# Patient Record
Sex: FEMALE | Race: WHITE | Employment: FULL TIME | URBAN - METROPOLITAN AREA
[De-identification: names, ages, dates, MRNs, and addresses within clinical notes are randomized per-mention and may not be internally consistent; named-entity substitution may affect disease eponyms.]

---

## 2017-02-23 ENCOUNTER — OFFICE VISIT (OUTPATIENT)
Dept: ORTHOPEDIC SURGERY | Age: 47
End: 2017-02-23

## 2017-02-23 VITALS
SYSTOLIC BLOOD PRESSURE: 121 MMHG | DIASTOLIC BLOOD PRESSURE: 83 MMHG | HEIGHT: 61 IN | HEART RATE: 83 BPM | WEIGHT: 169.97 LBS | BODY MASS INDEX: 32.09 KG/M2

## 2017-02-23 DIAGNOSIS — M79.671 RIGHT FOOT PAIN: Primary | ICD-10-CM

## 2017-02-23 DIAGNOSIS — M21.611 BUNION OF RIGHT FOOT: ICD-10-CM

## 2017-02-23 DIAGNOSIS — M67.471 GANGLION CYST OF RIGHT FOOT: ICD-10-CM

## 2017-02-23 PROCEDURE — 73630 X-RAY EXAM OF FOOT: CPT | Performed by: ORTHOPAEDIC SURGERY

## 2017-02-23 PROCEDURE — 99214 OFFICE O/P EST MOD 30 MIN: CPT | Performed by: ORTHOPAEDIC SURGERY

## 2018-07-20 ENCOUNTER — OFFICE VISIT (OUTPATIENT)
Dept: INTERNAL MEDICINE CLINIC | Age: 48
End: 2018-07-20

## 2018-07-20 VITALS
DIASTOLIC BLOOD PRESSURE: 80 MMHG | HEIGHT: 61 IN | RESPIRATION RATE: 18 BRPM | BODY MASS INDEX: 32.1 KG/M2 | SYSTOLIC BLOOD PRESSURE: 140 MMHG | WEIGHT: 170 LBS | HEART RATE: 70 BPM

## 2018-07-20 DIAGNOSIS — C43.72 MALIGNANT MELANOMA OF LEFT THIGH (HCC): ICD-10-CM

## 2018-07-20 DIAGNOSIS — K21.9 GASTROESOPHAGEAL REFLUX DISEASE WITHOUT ESOPHAGITIS: ICD-10-CM

## 2018-07-20 DIAGNOSIS — I10 BENIGN ESSENTIAL HTN: ICD-10-CM

## 2018-07-20 DIAGNOSIS — F41.9 ANXIETY: ICD-10-CM

## 2018-07-20 DIAGNOSIS — Z76.89 ENCOUNTER TO ESTABLISH CARE: Primary | ICD-10-CM

## 2018-07-20 DIAGNOSIS — E78.2 MIXED HYPERLIPIDEMIA: ICD-10-CM

## 2018-07-20 PROCEDURE — 99202 OFFICE O/P NEW SF 15 MIN: CPT | Performed by: INTERNAL MEDICINE

## 2018-07-20 RX ORDER — BUSPIRONE HYDROCHLORIDE 5 MG/1
5 TABLET ORAL DAILY
COMMUNITY
End: 2019-06-20 | Stop reason: SDUPTHER

## 2018-07-20 RX ORDER — ESOMEPRAZOLE MAGNESIUM 20 MG/1
20 FOR SUSPENSION ORAL DAILY
COMMUNITY
End: 2018-09-25 | Stop reason: SDUPTHER

## 2018-07-20 RX ORDER — HYDROCHLOROTHIAZIDE 25 MG/1
25 TABLET ORAL DAILY
COMMUNITY
End: 2018-10-31 | Stop reason: SDUPTHER

## 2018-07-20 ASSESSMENT — PATIENT HEALTH QUESTIONNAIRE - PHQ9
1. LITTLE INTEREST OR PLEASURE IN DOING THINGS: 0
SUM OF ALL RESPONSES TO PHQ QUESTIONS 1-9: 0
SUM OF ALL RESPONSES TO PHQ9 QUESTIONS 1 & 2: 0
2. FEELING DOWN, DEPRESSED OR HOPELESS: 0

## 2018-07-20 ASSESSMENT — ENCOUNTER SYMPTOMS
CONSTIPATION: 0
ABDOMINAL PAIN: 0
NAUSEA: 0
CHEST TIGHTNESS: 0
TROUBLE SWALLOWING: 0
COLOR CHANGE: 0
SHORTNESS OF BREATH: 0
PHOTOPHOBIA: 0

## 2018-07-20 NOTE — PROGRESS NOTES
current facility-administered medications for this visit. Review of Systems   Constitutional: Negative for activity change, diaphoresis and unexpected weight change. HENT: Negative for congestion, ear discharge, hearing loss and trouble swallowing. Eyes: Negative for photophobia and visual disturbance. Respiratory: Negative for chest tightness and shortness of breath. Cardiovascular: Negative for chest pain and palpitations. Gastrointestinal: Negative for abdominal pain, constipation and nausea. Endocrine: Negative for polyuria. Genitourinary: Negative for dysuria, flank pain and hematuria. Musculoskeletal: Negative for arthralgias and gait problem. Skin: Negative for color change. Allergic/Immunologic: Negative for immunocompromised state. Neurological: Negative for tremors, seizures, weakness and numbness. Psychiatric/Behavioral: Negative for decreased concentration and sleep disturbance. The patient is nervous/anxious. Objective:   Physical Exam    Vitals:    07/20/18 1530   BP: (!) 140/80   Pulse: 70   Resp: 18         General:  Middle aged female, Awake, alert and oriented. Appears to be not in any distress  Mucous Membranes:  Pink , anicteric  Neck: No JVD, no carotid bruit, no thyromegaly  Chest:  Clear to auscultation bilaterally, no added sounds  Cardiovascular:  RRR S1S2 heard, no murmurs or gallops  Abdomen:  Soft, undistended, non tender, no organomegaly, BS present  Extremities: faint lacy rash on both legs noted, No edema or cyanosis. Distal pulses well felt  Neurological : grossly normal  Non focal     Assessment:       Diagnosis Orders   1. Encounter to establish care     2. Malignant melanoma of left thigh ([address]     3. Benign essential HTN     4. Mixed hyperlipidemia     5. Gastroesophageal reflux disease without esophagitis     6.  Anxiety             Plan:        HTN - stable on norvasc, hctz    Chronic anxiety/depression - on buspar prn    GERD - on PPI Hyperlipidemia - on statins - last     Elevated A1c - diet controlled, last A1c at 6     Intermittent rash on lower ext - etiology unclear.  Pt reports rash only when standing or walking for long distances and resolves in a week with no intervention     Melanoma - s.p excision     Family hx of CAD - recommend Ct coronary calcium scoring given early onset CAD   F/w 6 months

## 2018-08-20 ENCOUNTER — OFFICE VISIT (OUTPATIENT)
Dept: INTERNAL MEDICINE CLINIC | Age: 48
End: 2018-08-20

## 2018-08-20 VITALS
BODY MASS INDEX: 31.91 KG/M2 | RESPIRATION RATE: 18 BRPM | HEIGHT: 61 IN | DIASTOLIC BLOOD PRESSURE: 83 MMHG | WEIGHT: 169 LBS | HEART RATE: 85 BPM | SYSTOLIC BLOOD PRESSURE: 143 MMHG

## 2018-08-20 DIAGNOSIS — I10 BENIGN ESSENTIAL HTN: Primary | ICD-10-CM

## 2018-08-20 PROCEDURE — 99212 OFFICE O/P EST SF 10 MIN: CPT | Performed by: INTERNAL MEDICINE

## 2018-08-20 ASSESSMENT — ENCOUNTER SYMPTOMS
TROUBLE SWALLOWING: 0
SHORTNESS OF BREATH: 0
CHEST TIGHTNESS: 0
CONSTIPATION: 0
ABDOMINAL PAIN: 0
NAUSEA: 0
COLOR CHANGE: 0
PHOTOPHOBIA: 0

## 2018-08-20 NOTE — PROGRESS NOTES
Subjective:      Patient ID: Dinorah Anderson is a 51 y.o. female. Hypertension   Pertinent negatives include no chest pain, palpitations or shortness of breath. 51 y.o. female here for high BP readings noted at school today  Has been having palpitations, stress with school work and notes high readings  And got more worried     HTN - chronic and stable on hctz and norvasc, no dizziness or pedal edema     Has h.o chronic anxiety on buspar prn only. Does not use frequently  Reports anxiety from health and family stress  Works as a  in [place]  No depression issues      Current Outpatient Prescriptions   Medication Sig Dispense Refill    busPIRone (BUSPAR) 5 MG tablet Take 5 mg by mouth daily      hydrochlorothiazide (HYDRODIURIL) 25 MG tablet Take 25 mg by mouth daily      esomeprazole Magnesium (NEXIUM) 20 MG PACK Take 20 mg by mouth daily      amLODIPine (NORVASC) 5 MG tablet       atorvastatin (LIPITOR) 20 MG tablet Take 10 mg by mouth daily       aspirin 81 MG tablet Take 81 mg by mouth daily. No current facility-administered medications for this visit. Review of Systems   Constitutional: Negative for activity change, diaphoresis and unexpected weight change. HENT: Negative for congestion, ear discharge, hearing loss and trouble swallowing. Eyes: Negative for photophobia and visual disturbance. Respiratory: Negative for chest tightness and shortness of breath. Cardiovascular: Negative for chest pain and palpitations. Gastrointestinal: Negative for abdominal pain, constipation and nausea. Endocrine: Negative for polyuria. Genitourinary: Negative for dysuria, flank pain and hematuria. Musculoskeletal: Negative for arthralgias and gait problem. Skin: Negative for color change. Allergic/Immunologic: Negative for immunocompromised state. Neurological: Negative for tremors, seizures, weakness and numbness.    Psychiatric/Behavioral: Negative for decreased concentration and sleep disturbance. The patient is nervous/anxious. Objective:   Physical Exam    Vitals:    08/20/18 1601   BP: (!) 143/83   Pulse: 85   Resp: 18         General:  Middle aged female, Awake, alert and oriented. Appears to be not in any distress  Mucous Membranes:  Pink , anicteric  Neck: No JVD, no carotid bruit, no thyromegaly  Chest:  Clear to auscultation bilaterally, no added sounds  Cardiovascular:  RRR S1S2 heard, no murmurs or gallops  Abdomen:  Soft, undistended, non tender, no organomegaly, BS present  Extremities: faint lacy rash on both legs noted, No edema or cyanosis. Distal pulses well felt  Neurological : grossly normal  Non focal     Assessment:       Diagnosis Orders   1. Benign essential HTN             Plan:        HTN - stable on norvasc, hctz today.  Not significant high as pt thought  Pt reassured  Pt symptoms could be related to anxiety      Chronic anxiety/depression - on buspar prn      Family hx of CAD - recommend Ct coronary calcium scoring given early onset CAD   F/w 6 months

## 2018-09-25 RX ORDER — ESOMEPRAZOLE MAGNESIUM 20 MG/1
20 FOR SUSPENSION ORAL DAILY
Qty: 30 PACKET | Refills: 2 | Status: SHIPPED | OUTPATIENT
Start: 2018-09-25 | End: 2018-11-26 | Stop reason: SDUPTHER

## 2018-10-08 RX ORDER — ATORVASTATIN CALCIUM 20 MG/1
10 TABLET, FILM COATED ORAL DAILY
Qty: 15 TABLET | Refills: 0 | Status: SHIPPED | OUTPATIENT
Start: 2018-10-08 | End: 2018-11-07 | Stop reason: SDUPTHER

## 2018-11-01 RX ORDER — AMLODIPINE BESYLATE 5 MG/1
5 TABLET ORAL DAILY
Qty: 30 TABLET | Refills: 0 | Status: SHIPPED | OUTPATIENT
Start: 2018-11-01 | End: 2018-11-26 | Stop reason: SDUPTHER

## 2018-11-01 RX ORDER — HYDROCHLOROTHIAZIDE 25 MG/1
25 TABLET ORAL DAILY
Qty: 30 TABLET | Refills: 0 | Status: SHIPPED | OUTPATIENT
Start: 2018-11-01 | End: 2018-11-26 | Stop reason: SDUPTHER

## 2018-11-07 RX ORDER — ATORVASTATIN CALCIUM 20 MG/1
TABLET, FILM COATED ORAL
Qty: 15 TABLET | Refills: 0 | Status: SHIPPED | OUTPATIENT
Start: 2018-11-07 | End: 2018-12-12 | Stop reason: SDUPTHER

## 2018-11-26 RX ORDER — HYDROCHLOROTHIAZIDE 25 MG/1
25 TABLET ORAL DAILY
Qty: 30 TABLET | Refills: 2 | Status: SHIPPED | OUTPATIENT
Start: 2018-11-26 | End: 2019-01-15 | Stop reason: SDUPTHER

## 2018-11-26 RX ORDER — AMLODIPINE BESYLATE 5 MG/1
5 TABLET ORAL DAILY
Qty: 30 TABLET | Refills: 2 | Status: SHIPPED | OUTPATIENT
Start: 2018-11-26 | End: 2019-01-15 | Stop reason: SDUPTHER

## 2018-11-26 RX ORDER — ESOMEPRAZOLE MAGNESIUM 20 MG/1
20 FOR SUSPENSION ORAL DAILY
Qty: 30 PACKET | Refills: 2 | Status: SHIPPED | OUTPATIENT
Start: 2018-11-26 | End: 2018-12-19 | Stop reason: SDUPTHER

## 2018-12-12 RX ORDER — ATORVASTATIN CALCIUM 20 MG/1
TABLET, FILM COATED ORAL
Qty: 15 TABLET | Refills: 0 | Status: SHIPPED | OUTPATIENT
Start: 2018-12-12 | End: 2019-01-15 | Stop reason: SDUPTHER

## 2018-12-17 ENCOUNTER — TELEPHONE (OUTPATIENT)
Dept: INTERNAL MEDICINE CLINIC | Age: 48
End: 2018-12-17

## 2018-12-17 RX ORDER — PANTOPRAZOLE SODIUM 40 MG/1
40 TABLET, DELAYED RELEASE ORAL DAILY
Qty: 90 TABLET | Refills: 0 | Status: SHIPPED | OUTPATIENT
Start: 2018-12-17 | End: 2019-01-15 | Stop reason: ALTCHOICE

## 2018-12-17 NOTE — TELEPHONE ENCOUNTER
----- Message from Faviola Partida MD sent at 12/17/2018  1:46 PM EST -----  Change to Protonix 40 mg po daily  ----- Message -----  From: Getachew Hernandez  Sent: 12/17/2018   9:29 AM  To: Faviola Partida MD    PA for Nexium was denied. Pt must try lansoprazole, omeprazole, pantoprazole or dexilant. Please advise.

## 2018-12-19 RX ORDER — ESOMEPRAZOLE MAGNESIUM 20 MG/1
20 FOR SUSPENSION ORAL DAILY
Qty: 30 PACKET | Refills: 0 | Status: SHIPPED | OUTPATIENT
Start: 2018-12-19 | End: 2019-01-15 | Stop reason: ALTCHOICE

## 2018-12-20 ENCOUNTER — TELEPHONE (OUTPATIENT)
Dept: INTERNAL MEDICINE CLINIC | Age: 48
End: 2018-12-20

## 2019-01-15 ENCOUNTER — OFFICE VISIT (OUTPATIENT)
Dept: INTERNAL MEDICINE CLINIC | Age: 49
End: 2019-01-15

## 2019-01-15 VITALS
HEIGHT: 61 IN | HEART RATE: 70 BPM | RESPIRATION RATE: 18 BRPM | WEIGHT: 171 LBS | SYSTOLIC BLOOD PRESSURE: 110 MMHG | BODY MASS INDEX: 32.28 KG/M2 | DIASTOLIC BLOOD PRESSURE: 78 MMHG

## 2019-01-15 DIAGNOSIS — Z00.00 ANNUAL PHYSICAL EXAM: Primary | ICD-10-CM

## 2019-01-15 DIAGNOSIS — I10 BENIGN ESSENTIAL HTN: ICD-10-CM

## 2019-01-15 DIAGNOSIS — E78.2 MIXED HYPERLIPIDEMIA: ICD-10-CM

## 2019-01-15 DIAGNOSIS — K21.9 GASTROESOPHAGEAL REFLUX DISEASE WITHOUT ESOPHAGITIS: ICD-10-CM

## 2019-01-15 DIAGNOSIS — Z12.31 SCREENING MAMMOGRAM, ENCOUNTER FOR: ICD-10-CM

## 2019-01-15 PROCEDURE — 99396 PREV VISIT EST AGE 40-64: CPT | Performed by: INTERNAL MEDICINE

## 2019-01-15 PROCEDURE — 81002 URINALYSIS NONAUTO W/O SCOPE: CPT | Performed by: INTERNAL MEDICINE

## 2019-01-15 RX ORDER — HYDROCHLOROTHIAZIDE 25 MG/1
25 TABLET ORAL DAILY
Qty: 90 TABLET | Refills: 1 | Status: SHIPPED | OUTPATIENT
Start: 2019-01-15 | End: 2019-06-20 | Stop reason: SDUPTHER

## 2019-01-15 RX ORDER — AMLODIPINE BESYLATE 5 MG/1
5 TABLET ORAL DAILY
Qty: 90 TABLET | Refills: 1 | Status: SHIPPED | OUTPATIENT
Start: 2019-01-15 | End: 2019-06-20 | Stop reason: SDUPTHER

## 2019-01-15 RX ORDER — ATORVASTATIN CALCIUM 10 MG/1
10 TABLET, FILM COATED ORAL DAILY
Qty: 90 TABLET | Refills: 1 | Status: SHIPPED | OUTPATIENT
Start: 2019-01-15 | End: 2019-06-20 | Stop reason: SDUPTHER

## 2019-01-15 RX ORDER — ATORVASTATIN CALCIUM 20 MG/1
TABLET, FILM COATED ORAL
Qty: 45 TABLET | Refills: 2 | Status: SHIPPED | OUTPATIENT
Start: 2019-01-15 | End: 2019-01-15 | Stop reason: ALTCHOICE

## 2019-01-15 ASSESSMENT — ENCOUNTER SYMPTOMS
TROUBLE SWALLOWING: 0
CHEST TIGHTNESS: 0
COLOR CHANGE: 0
NAUSEA: 0
PHOTOPHOBIA: 0
CONSTIPATION: 0
ABDOMINAL PAIN: 0

## 2019-01-15 ASSESSMENT — PATIENT HEALTH QUESTIONNAIRE - PHQ9
SUM OF ALL RESPONSES TO PHQ QUESTIONS 1-9: 0
2. FEELING DOWN, DEPRESSED OR HOPELESS: 0
SUM OF ALL RESPONSES TO PHQ QUESTIONS 1-9: 0
1. LITTLE INTEREST OR PLEASURE IN DOING THINGS: 0
SUM OF ALL RESPONSES TO PHQ9 QUESTIONS 1 & 2: 0

## 2019-05-21 ENCOUNTER — TELEPHONE (OUTPATIENT)
Dept: INTERNAL MEDICINE CLINIC | Age: 49
End: 2019-05-21

## 2019-05-21 NOTE — TELEPHONE ENCOUNTER
----- Message from Jose Juan Baldwin sent at 5/20/2019  2:03 PM EDT -----  Contact: Dimas Doshi  Left mess. For pt to call and sched appt with tushar -satya   ----- Message -----  From: Tim Newell  Sent: 5/20/2019   1:48 PM  To: Jose Juan Baldwin        ----- Message -----  From: Lola Leggett MD  Sent: 5/20/2019   1:15 PM  To: Romero Barnes    Make appt with TUSHAR    ----- Message -----  From: Romero Arroyo  Sent: 5/20/2019   8:15 AM  To: Lola Leggett MD    Wanted to see you this week. Had chest pains a couple of days last week and over the weekend Her BP was up one day   No appt available.  Please advise Cell [phone] Thanks [facility]

## 2019-05-22 ENCOUNTER — TELEPHONE (OUTPATIENT)
Dept: INTERNAL MEDICINE CLINIC | Age: 49
End: 2019-05-22

## 2019-06-20 ENCOUNTER — OFFICE VISIT (OUTPATIENT)
Dept: FAMILY MEDICINE CLINIC | Age: 49
End: 2019-06-20
Payer: COMMERCIAL

## 2019-06-20 VITALS
DIASTOLIC BLOOD PRESSURE: 60 MMHG | TEMPERATURE: 98.5 F | HEIGHT: 61 IN | HEART RATE: 78 BPM | BODY MASS INDEX: 32.28 KG/M2 | OXYGEN SATURATION: 98 % | SYSTOLIC BLOOD PRESSURE: 114 MMHG | WEIGHT: 171 LBS

## 2019-06-20 DIAGNOSIS — F41.9 ANXIETY: ICD-10-CM

## 2019-06-20 DIAGNOSIS — R79.89 LOW VITAMIN D LEVEL: ICD-10-CM

## 2019-06-20 DIAGNOSIS — Z00.00 PHYSICAL EXAM: Primary | ICD-10-CM

## 2019-06-20 LAB
A/G RATIO: 1.5 (ref 1.1–2.2)
ALBUMIN SERPL-MCNC: 4.3 G/DL (ref 3.4–5)
ALP BLD-CCNC: 73 U/L (ref 40–129)
ALT SERPL-CCNC: 25 U/L (ref 10–40)
ANION GAP SERPL CALCULATED.3IONS-SCNC: 17 MMOL/L (ref 3–16)
AST SERPL-CCNC: 18 U/L (ref 15–37)
BASOPHILS ABSOLUTE: 0.1 K/UL (ref 0–0.2)
BASOPHILS RELATIVE PERCENT: 1 %
BILIRUB SERPL-MCNC: 0.5 MG/DL (ref 0–1)
BUN BLDV-MCNC: 11 MG/DL (ref 7–20)
CALCIUM SERPL-MCNC: 9.4 MG/DL (ref 8.3–10.6)
CHLORIDE BLD-SCNC: 102 MMOL/L (ref 99–110)
CHOLESTEROL, TOTAL: 208 MG/DL (ref 0–199)
CO2: 23 MMOL/L (ref 21–32)
CREAT SERPL-MCNC: 0.6 MG/DL (ref 0.6–1.1)
EOSINOPHILS ABSOLUTE: 0.2 K/UL (ref 0–0.6)
EOSINOPHILS RELATIVE PERCENT: 2.2 %
GFR AFRICAN AMERICAN: >60
GFR NON-AFRICAN AMERICAN: >60
GLOBULIN: 2.8 G/DL
GLUCOSE BLD-MCNC: 103 MG/DL (ref 70–99)
HCT VFR BLD CALC: 46.4 % (ref 36–48)
HDLC SERPL-MCNC: 49 MG/DL (ref 40–60)
HEMOGLOBIN: 15.4 G/DL (ref 12–16)
LDL CHOLESTEROL CALCULATED: 115 MG/DL
LYMPHOCYTES ABSOLUTE: 2.2 K/UL (ref 1–5.1)
LYMPHOCYTES RELATIVE PERCENT: 25 %
MCH RBC QN AUTO: 28.5 PG (ref 26–34)
MCHC RBC AUTO-ENTMCNC: 33.2 G/DL (ref 31–36)
MCV RBC AUTO: 85.8 FL (ref 80–100)
MONOCYTES ABSOLUTE: 0.6 K/UL (ref 0–1.3)
MONOCYTES RELATIVE PERCENT: 7.2 %
NEUTROPHILS ABSOLUTE: 5.8 K/UL (ref 1.7–7.7)
NEUTROPHILS RELATIVE PERCENT: 64.6 %
PDW BLD-RTO: 13 % (ref 12.4–15.4)
PLATELET # BLD: 316 K/UL (ref 135–450)
PMV BLD AUTO: 9.1 FL (ref 5–10.5)
POTASSIUM SERPL-SCNC: 3.8 MMOL/L (ref 3.5–5.1)
RBC # BLD: 5.4 M/UL (ref 4–5.2)
SODIUM BLD-SCNC: 142 MMOL/L (ref 136–145)
TOTAL PROTEIN: 7.1 G/DL (ref 6.4–8.2)
TRIGL SERPL-MCNC: 221 MG/DL (ref 0–150)
TSH REFLEX FT4: 1.24 UIU/ML (ref 0.27–4.2)
VLDLC SERPL CALC-MCNC: 44 MG/DL
WBC # BLD: 9 K/UL (ref 4–11)

## 2019-06-20 PROCEDURE — 36415 COLL VENOUS BLD VENIPUNCTURE: CPT | Performed by: NURSE PRACTITIONER

## 2019-06-20 PROCEDURE — 99386 PREV VISIT NEW AGE 40-64: CPT | Performed by: NURSE PRACTITIONER

## 2019-06-20 RX ORDER — HYDROCHLOROTHIAZIDE 25 MG/1
25 TABLET ORAL DAILY
Qty: 90 TABLET | Refills: 1 | Status: SHIPPED | OUTPATIENT
Start: 2019-06-20 | End: 2020-02-03 | Stop reason: SDUPTHER

## 2019-06-20 RX ORDER — ATORVASTATIN CALCIUM 10 MG/1
10 TABLET, FILM COATED ORAL DAILY
Qty: 90 TABLET | Refills: 1 | Status: SHIPPED | OUTPATIENT
Start: 2019-06-20 | End: 2019-12-21 | Stop reason: SDUPTHER

## 2019-06-20 RX ORDER — AMLODIPINE BESYLATE 5 MG/1
5 TABLET ORAL DAILY
Qty: 90 TABLET | Refills: 1 | Status: SHIPPED | OUTPATIENT
Start: 2019-06-20 | End: 2019-12-21 | Stop reason: SDUPTHER

## 2019-06-20 RX ORDER — BUSPIRONE HYDROCHLORIDE 5 MG/1
5 TABLET ORAL 3 TIMES DAILY PRN
Qty: 90 TABLET | Refills: 1 | Status: SHIPPED | OUTPATIENT
Start: 2019-06-20 | End: 2021-03-04 | Stop reason: SINTOL

## 2019-06-20 ASSESSMENT — ENCOUNTER SYMPTOMS
RHINORRHEA: 0
SHORTNESS OF BREATH: 0
EYE PAIN: 0
NAUSEA: 0
BACK PAIN: 0
DIARRHEA: 0
CONSTIPATION: 0
WHEEZING: 0
SORE THROAT: 0
VOMITING: 0
COLOR CHANGE: 0

## 2019-06-20 NOTE — PROGRESS NOTES
Patient ID: Kasandra Weaver is a 51 y.o. female who presents today for a Physical Exam.      ELLIE Jaimes is a 52 yo female who is here for physical and to establish as new patient. Has times where she will get hot, flushed, heart palpitations but not mentally. 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 She states she is going to have more stress at work in the fall. Wants to try to exercise and change diet before trying meds at this time. Past Medical History:   Diagnosis Date    Anxiety     GERD (gastroesophageal reflux disease)     Hyperlipidemia     Hypertension     [facility] ([facility] [address]        Past Surgical History:   Procedure Laterality Date    HYSTERECTOMY, TOTAL ABDOMINAL      Has ovaries    TONSILLECTOMY         Family History   Problem Relation Age of Onset    Heart Disease Father     High Blood Pressure Father     Macular Degen Mother     Diabetes Mother     High Cholesterol Mother     High Cholesterol Brother     Diabetes Maternal Grandmother     Heart Disease Maternal Grandfather     Cancer Paternal Grandmother     Heart Disease Paternal Grandfather           Social History     Socioeconomic History    Marital status:      Spouse name: None    Number of children: None    Years of education: None    Highest education level: None   Occupational History    None   Social Needs    Financial resource strain: None    Food insecurity:     Worry: None     Inability: None    Transportation needs:     Medical: None     Non-medical: None   Tobacco Use    Smoking status: Never Smoker    Smokeless tobacco: Never Used   Substance and Sexual Activity    Alcohol use:  Yes    Drug use: No    Sexual activity: None   Lifestyle    Physical activity:     Days per week: None constipation, diarrhea, nausea and vomiting. Endocrine: Negative for cold intolerance and heat intolerance. Genitourinary: Positive for frequency. Negative for dysuria and urgency. Musculoskeletal: Negative for back pain and myalgias. Skin: Negative for color change, pallor and rash. Allergic/Immunologic: Negative for environmental allergies and food allergies. Neurological: Negative for dizziness, light-headedness and headaches. Hematological: Does not bruise/bleed easily. Psychiatric/Behavioral: Positive for dysphoric mood. Negative for sleep disturbance. The patient is nervous/anxious. Physical Exam   Constitutional: She is oriented to person, place, and time. She appears well-developed and well-nourished. HENT:   Head: Normocephalic and atraumatic. Right Ear: Tympanic membrane and external ear normal.   Left Ear: Tympanic membrane and external ear normal.   Nose: Nose normal.   Mouth/Throat: Oropharynx is clear and moist. No oropharyngeal exudate, posterior oropharyngeal edema or posterior oropharyngeal erythema. Eyes: Pupils are equal, round, and reactive to light. Conjunctivae are normal.   Neck: Normal range of motion. Neck supple. Cardiovascular: Normal rate, regular rhythm and normal heart sounds. No murmur heard. Pulmonary/Chest: Effort normal and breath sounds normal. No respiratory distress. She has no wheezes. She has no rales. Abdominal: Soft. Bowel sounds are normal. She exhibits no distension. There is no tenderness. There is no rebound. Musculoskeletal: Normal range of motion. Lymphadenopathy:     She has no cervical adenopathy. Neurological: She is alert and oriented to person, place, and time. She has normal reflexes. Skin: Skin is warm and dry. Psychiatric: She has a normal mood and affect. Her behavior is normal. Judgment and thought content normal.   Nursing note and vitals reviewed. Assessment:  Encounter Diagnoses   Name Primary?     Low vitamin D level     Physical exam Yes    Anxiety        Plan:  1. Low vitamin D level    - Vitamin D 25 Hydroxy    2. Physical exam    - CBC Auto Differential  - Comprehensive Metabolic Panel  - Lipid Panel  - Hemoglobin A1C  - TSH WITH REFLEX TO FT4    3. Anxiety  Patient will try changing diet and exercise- maybe try some yoga and let me know if she wants to try lexapro. She will take 1/2 of a 10 mg dose daily for a week and then take full tablet ( if she decides to take meds)                  No follow-ups on file.

## 2019-06-21 LAB
ESTIMATED AVERAGE GLUCOSE: 139.9 MG/DL
HBA1C MFR BLD: 6.5 %
VITAMIN D 25-HYDROXY: 49.9 NG/ML

## 2019-07-18 ENCOUNTER — OFFICE VISIT (OUTPATIENT)
Dept: FAMILY MEDICINE CLINIC | Age: 49
End: 2019-07-18
Payer: COMMERCIAL

## 2019-07-18 VITALS
DIASTOLIC BLOOD PRESSURE: 68 MMHG | BODY MASS INDEX: 31.74 KG/M2 | WEIGHT: 168 LBS | OXYGEN SATURATION: 97 % | HEART RATE: 76 BPM | TEMPERATURE: 98.6 F | SYSTOLIC BLOOD PRESSURE: 112 MMHG

## 2019-07-18 DIAGNOSIS — L72.3 SEBACEOUS CYST OF RIGHT AXILLA: Primary | ICD-10-CM

## 2019-07-18 PROCEDURE — 99213 OFFICE O/P EST LOW 20 MIN: CPT | Performed by: NURSE PRACTITIONER

## 2019-07-18 ASSESSMENT — ENCOUNTER SYMPTOMS
SHORTNESS OF BREATH: 0
WHEEZING: 0

## 2019-07-18 NOTE — PROGRESS NOTES
Patient: Daily Castro is a 51 y.o. female who presents today with the following Chief Complaint(s):  Chief Complaint   Patient presents with    Cyst     under right arm         HPI   Presents for cyst under right arm. Diabetes: States she is doing well with changing her diet and exercising after A1c of 6.5- will recheck A1C in Sept. If higher will start meds. Discusses plan for calcium scoring test and vascular screening. She states [facility] has a program where you pay $100 to get vascular screenings done- she is doing this soon. Current Outpatient Medications   Medication Sig Dispense Refill    Cholecalciferol (VITAMIN D3) 5000 units TABS Take by mouth      Cyanocobalamin (VITAMIN B-12 PO) Take 1,200 mg by mouth daily      esomeprazole (NEXIUM) 20 MG delayed release capsule Take 1 capsule by mouth daily 90 capsule 1    atorvastatin (LIPITOR) 10 MG tablet Take 1 tablet by mouth daily 90 tablet 1    amLODIPine (NORVASC) 5 MG tablet Take 1 tablet by mouth daily 90 tablet 1    hydrochlorothiazide (HYDRODIURIL) 25 MG tablet Take 1 tablet by mouth daily 90 tablet 1    busPIRone (BUSPAR) 5 MG tablet Take 1 tablet by mouth 3 times daily as needed (anxiety) 90 tablet 1    aspirin 81 MG tablet Take 81 mg by mouth daily. No current facility-administered medications for this visit. Patient's past medical history, surgical history, family history, medications,  andallergies  were all reviewed and updated as appropriate today. Review of Systems   Respiratory: Negative for shortness of breath and wheezing. Cardiovascular: Negative for chest pain and palpitations. Genitourinary: Negative for dysuria, frequency and urgency. Physical Exam   Constitutional: She is oriented to person, place, and time. She appears well-developed and well-nourished. HENT:   Head: Normocephalic and atraumatic. Mouth/Throat: No posterior oropharyngeal edema or posterior oropharyngeal erythema.    Eyes:

## 2019-09-04 DIAGNOSIS — E11.9 TYPE 2 DIABETES MELLITUS WITHOUT COMPLICATION, WITHOUT LONG-TERM CURRENT USE OF INSULIN (HCC): Primary | ICD-10-CM

## 2019-11-27 ENCOUNTER — OFFICE VISIT (OUTPATIENT)
Dept: FAMILY MEDICINE CLINIC | Age: 49
End: 2019-11-27
Payer: COMMERCIAL

## 2019-11-27 VITALS
DIASTOLIC BLOOD PRESSURE: 72 MMHG | BODY MASS INDEX: 29.83 KG/M2 | TEMPERATURE: 99.7 F | OXYGEN SATURATION: 98 % | SYSTOLIC BLOOD PRESSURE: 120 MMHG | WEIGHT: 158 LBS | HEIGHT: 61 IN | HEART RATE: 84 BPM

## 2019-11-27 DIAGNOSIS — R00.2 FLUTTERING SENSATION OF HEART: Primary | ICD-10-CM

## 2019-11-27 DIAGNOSIS — E11.9 TYPE 2 DIABETES MELLITUS WITHOUT COMPLICATION, WITHOUT LONG-TERM CURRENT USE OF INSULIN (HCC): ICD-10-CM

## 2019-11-27 DIAGNOSIS — J06.9 VIRAL URI: ICD-10-CM

## 2019-11-27 LAB
HBA1C MFR BLD: 6.3 %
TSH REFLEX FT4: 1.67 UIU/ML (ref 0.27–4.2)

## 2019-11-27 PROCEDURE — 99214 OFFICE O/P EST MOD 30 MIN: CPT | Performed by: NURSE PRACTITIONER

## 2019-11-27 PROCEDURE — 93000 ELECTROCARDIOGRAM COMPLETE: CPT | Performed by: NURSE PRACTITIONER

## 2019-11-27 PROCEDURE — 83036 HEMOGLOBIN GLYCOSYLATED A1C: CPT | Performed by: NURSE PRACTITIONER

## 2019-11-27 ASSESSMENT — ENCOUNTER SYMPTOMS
SORE THROAT: 1
COUGH: 1
SHORTNESS OF BREATH: 0
WHEEZING: 0

## 2019-12-02 RX ORDER — AMOXICILLIN AND CLAVULANATE POTASSIUM 875; 125 MG/1; MG/1
1 TABLET, FILM COATED ORAL 2 TIMES DAILY
Qty: 20 TABLET | Refills: 0 | Status: SHIPPED | OUTPATIENT
Start: 2019-12-02 | End: 2019-12-12

## 2019-12-23 RX ORDER — AMLODIPINE BESYLATE 5 MG/1
5 TABLET ORAL DAILY
Qty: 90 TABLET | Refills: 1 | Status: SHIPPED | OUTPATIENT
Start: 2019-12-23 | End: 2020-01-21

## 2019-12-23 RX ORDER — ATORVASTATIN CALCIUM 10 MG/1
10 TABLET, FILM COATED ORAL DAILY
Qty: 90 TABLET | Refills: 1 | Status: SHIPPED | OUTPATIENT
Start: 2019-12-23 | End: 2020-01-21

## 2020-01-21 RX ORDER — AMLODIPINE BESYLATE 5 MG/1
TABLET ORAL
Qty: 30 TABLET | Refills: 1 | Status: SHIPPED | OUTPATIENT
Start: 2020-01-21 | End: 2020-03-31 | Stop reason: SDUPTHER

## 2020-01-21 RX ORDER — ATORVASTATIN CALCIUM 10 MG/1
TABLET, FILM COATED ORAL
Qty: 30 TABLET | Refills: 1 | Status: SHIPPED | OUTPATIENT
Start: 2020-01-21 | End: 2020-03-16 | Stop reason: SDUPTHER

## 2020-02-03 RX ORDER — HYDROCHLOROTHIAZIDE 25 MG/1
25 TABLET ORAL DAILY
Qty: 90 TABLET | Refills: 1 | Status: SHIPPED | OUTPATIENT
Start: 2020-02-03 | End: 2020-08-13 | Stop reason: SDUPTHER

## 2020-03-16 RX ORDER — ATORVASTATIN CALCIUM 10 MG/1
10 TABLET, FILM COATED ORAL DAILY
Qty: 30 TABLET | Refills: 0 | Status: SHIPPED | OUTPATIENT
Start: 2020-03-16 | End: 2020-09-29 | Stop reason: SDUPTHER

## 2020-03-31 RX ORDER — AMLODIPINE BESYLATE 5 MG/1
TABLET ORAL
Qty: 90 TABLET | Refills: 1 | Status: SHIPPED | OUTPATIENT
Start: 2020-03-31 | End: 2020-09-24 | Stop reason: SDUPTHER

## 2020-04-20 ENCOUNTER — VIRTUAL VISIT (OUTPATIENT)
Dept: FAMILY MEDICINE CLINIC | Age: 50
End: 2020-04-20
Payer: COMMERCIAL

## 2020-04-20 ENCOUNTER — TELEPHONE (OUTPATIENT)
Dept: FAMILY MEDICINE CLINIC | Age: 50
End: 2020-04-20

## 2020-04-20 VITALS — HEART RATE: 89 BPM | SYSTOLIC BLOOD PRESSURE: 138 MMHG | DIASTOLIC BLOOD PRESSURE: 80 MMHG

## 2020-04-20 PROCEDURE — 99213 OFFICE O/P EST LOW 20 MIN: CPT | Performed by: NURSE PRACTITIONER

## 2020-04-20 RX ORDER — PROPRANOLOL HYDROCHLORIDE 10 MG/1
10 TABLET ORAL 3 TIMES DAILY
Qty: 90 TABLET | Refills: 3 | Status: SHIPPED | OUTPATIENT
Start: 2020-04-20 | End: 2020-11-12

## 2020-04-20 ASSESSMENT — PATIENT HEALTH QUESTIONNAIRE - PHQ9
1. LITTLE INTEREST OR PLEASURE IN DOING THINGS: 0
2. FEELING DOWN, DEPRESSED OR HOPELESS: 0
SUM OF ALL RESPONSES TO PHQ QUESTIONS 1-9: 0
SUM OF ALL RESPONSES TO PHQ9 QUESTIONS 1 & 2: 0
SUM OF ALL RESPONSES TO PHQ QUESTIONS 1-9: 0

## 2020-04-20 ASSESSMENT — ENCOUNTER SYMPTOMS
WHEEZING: 0
SHORTNESS OF BREATH: 0
COUGH: 1

## 2020-04-20 NOTE — PROGRESS NOTES
2020    TELEHEALTH EVALUATION -- Audio/Visual (During STQKC-87 public health emergency)    HPI:    Veronica Viveros (:  1970) has requested an audio/video evaluation for the following concern(s):  Heart palpitations: Last Thursday started feeling palpitations again. She states she feels like a \"fluttering\" that starts in her stomach and then will feel it at her throat and it makes her feels like she has to cough. She started taking buspar yesterday again- no help. She is not having any chest pain, headaches, dizziness or SOB. 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 She states she has not felt stressed. /80's today      Review of Systems   Respiratory: Positive for cough (only when she feels the \"flutter\"). Negative for shortness of breath and wheezing. Cardiovascular: Positive for palpitations. Negative for chest pain. Neurological: Negative for dizziness and headaches. Psychiatric/Behavioral: Negative for dysphoric mood. The patient is not nervous/anxious. Prior to Visit Medications    Medication Sig Taking?  Authorizing Provider   esomeprazole (NEXIUM) 20 MG delayed release capsule Take 1 capsule by mouth daily TAKE ONE CAPSULE BY MOUTH DAILY Yes THAO Cummings CNP   propranolol (INDERAL) 10 MG tablet Take 1 tablet by mouth 3 times daily Yes THAO Cummings CNP   amLODIPine (NORVASC) 5 MG tablet TAKE ONE TABLET BY MOUTH DAILY Yes THAO Cummings CNP   atorvastatin (LIPITOR) 10 MG tablet Take 1 tablet by mouth daily TAKE ONE TABLET BY MOUTH DAILY Yes Abhinav Up MD   hydrochlorothiazide (HYDRODIURIL) 25 MG tablet Take 1 tablet by mouth daily Yes THAO Cummings CNP   Cholecalciferol (VITAMIN D3) 5000 units TABS Take by mouth Yes Historical Provider, MD   Cyanocobalamin (VITAMIN B-12 PO) Take 1,200 mg by mouth daily Yes Historical Provider, MD   busPIRone (BUSPAR) 5 MG tablet Take 1 tablet by mouth 3 times daily as needed (anxiety) Yes THAO Christine - CNP   aspirin 81 MG tablet Take 81 mg by mouth daily. Historical Provider, MD       Social History     Tobacco Use    Smoking status: Never Smoker    Smokeless tobacco: Never Used   Substance Use Topics    Alcohol use: Yes    Drug use: No        Allergies   Allergen Reactions    Erythromycin    ,   Past Medical History:   Diagnosis Date    Anxiety     Diabetes ([facility] [address]     GERD (gastroesophageal reflux disease)     Hyperlipidemia     Hypertension     [facility]    ,   Past Surgical History:   Procedure Laterality Date    HYSTERECTOMY, TOTAL ABDOMINAL      Has ovaries    TONSILLECTOMY     ,   Social History     Tobacco Use    Smoking status: Never Smoker    Smokeless tobacco: Never Used   Substance Use Topics    Alcohol use: Yes    Drug use: No   ,   Family History   Problem Relation Age of Onset    Heart Disease Father     High Blood Pressure Father     Macular Degen Mother     Diabetes Mother     High Cholesterol Mother     High Cholesterol Brother     Diabetes Maternal Grandmother     Heart Disease Maternal Grandfather     Cancer Paternal Grandmother     Heart Disease Paternal Grandfather        PHYSICAL EXAMINATION:  [ INSTRUCTIONS:  \"[x]\" Indicates a positive item  \"[]\" Indicates a negative item  -- DELETE ALL ITEMS NOT EXAMINED]  Vital Signs: (As obtained by patient/caregiver or practitioner observation)    Blood pressure- 138/80 Heart rate- 89   Respiratory rate-    Temperature-  Pulse oximetry-     Constitutional: [x] Appears well-developed and well-nourished [x] No apparent distress      [] Abnormal-   Mental status  [x] Alert and awake  [x] Oriented to person/place/time [x]Able to follow commands      Eyes:  EOM    []  Normal  [] Abnormal-  Sclera  [x]  Normal  [] Abnormal -         Discharge [x]  None visible  [] Abnormal -    HENT:   [x] Normocephalic, atraumatic.   [] Abnormal   [] Mouth/Throat: Mucous membranes are moist.

## 2020-04-21 ENCOUNTER — TELEPHONE (OUTPATIENT)
Dept: FAMILY MEDICINE CLINIC | Age: 50
End: 2020-04-21

## 2020-04-21 NOTE — TELEPHONE ENCOUNTER
Pt wants to know since she is feeling fine should she take the medication that was called in for her yesterday. Please advise. States her symptoms come and go.

## 2020-06-11 ENCOUNTER — OFFICE VISIT (OUTPATIENT)
Dept: FAMILY MEDICINE CLINIC | Age: 50
End: 2020-06-11
Payer: COMMERCIAL

## 2020-06-11 VITALS
TEMPERATURE: 97.9 F | HEART RATE: 81 BPM | OXYGEN SATURATION: 98 % | DIASTOLIC BLOOD PRESSURE: 70 MMHG | BODY MASS INDEX: 31.16 KG/M2 | SYSTOLIC BLOOD PRESSURE: 110 MMHG | WEIGHT: 166 LBS

## 2020-06-11 PROCEDURE — 99213 OFFICE O/P EST LOW 20 MIN: CPT | Performed by: NURSE PRACTITIONER

## 2020-06-11 ASSESSMENT — ENCOUNTER SYMPTOMS
WHEEZING: 0
SHORTNESS OF BREATH: 0

## 2020-06-11 NOTE — PROGRESS NOTES
nursing note reviewed. Constitutional:       Appearance: Normal appearance. She is well-developed. HENT:      Head: Normocephalic and atraumatic. Right Ear: External ear normal.      Left Ear: External ear normal.      Nose: Nose normal.      Mouth/Throat:      Pharynx: No oropharyngeal exudate or posterior oropharyngeal erythema. Eyes:      Conjunctiva/sclera: Conjunctivae normal.   Neck:      Musculoskeletal: Normal range of motion and neck supple. Cardiovascular:      Rate and Rhythm: Normal rate and regular rhythm. Heart sounds: Normal heart sounds. No murmur. Pulmonary:      Effort: Pulmonary effort is normal. No respiratory distress. Breath sounds: Normal breath sounds. No wheezing or rales. Musculoskeletal: Normal range of motion. Lymphadenopathy:      Cervical: No cervical adenopathy. Skin:     General: Skin is warm and dry. Findings: Rash (bilateral lower extremity vasculitis) present. Neurological:      General: No focal deficit present. Mental Status: She is alert and oriented to person, place, and time. Deep Tendon Reflexes: Reflexes are normal and symmetric. Psychiatric:         Mood and Affect: Mood normal.         Behavior: Behavior normal.         Thought Content: Thought content normal.         Judgment: Judgment normal.       Vitals:    06/11/20 0740   BP: 110/70   Pulse: 81   Temp: 97.9 °F (36.6 °C)   SpO2: 98%       Assessment:  Encounter Diagnosis   Name Primary?  Vasculitis ([address] Yes       Plan:  1. Vasculitis ([address]  Note given-   Discussed: staying hydrated- limiting salt, using sunscreen of light covering on legs, elevation of legs          No follow-ups on file.

## 2020-06-12 ENCOUNTER — OFFICE VISIT (OUTPATIENT)
Dept: CARDIOLOGY CLINIC | Age: 50
End: 2020-06-12
Payer: COMMERCIAL

## 2020-06-12 VITALS
OXYGEN SATURATION: 95 % | BODY MASS INDEX: 30.88 KG/M2 | WEIGHT: 164.5 LBS | DIASTOLIC BLOOD PRESSURE: 72 MMHG | SYSTOLIC BLOOD PRESSURE: 114 MMHG | HEART RATE: 86 BPM

## 2020-06-12 PROBLEM — R00.2 PALPITATIONS: Status: ACTIVE | Noted: 2020-06-12

## 2020-06-12 PROCEDURE — 99203 OFFICE O/P NEW LOW 30 MIN: CPT | Performed by: INTERNAL MEDICINE

## 2020-06-12 PROCEDURE — 93000 ELECTROCARDIOGRAM COMPLETE: CPT | Performed by: INTERNAL MEDICINE

## 2020-06-12 PROCEDURE — 0296T PR EXT ECG > 48HR TO 21 DAY RCRD W/CONECT INTL RCRD: CPT | Performed by: INTERNAL MEDICINE

## 2020-06-12 NOTE — PROGRESS NOTES
non-tender, +BS x 4, no masses, no organomegaly   Extremities: Extremities normal, atraumatic, no cyanosis or edema   Pulses: 2+ and symmetric   Skin: Skin color, texture, turgor normal, no rashes or lesions   Pysch: Normal mood and affect   Neurologic: Normal gross motor and sensory exam.         LABS   CBC:      Lab Results   Component Value Date    WBC 9.0 2019    RBC 5.40 2019    HGB 15.4 2019    HCT 46.4 2019    MCV 85.8 2019    RDW 13.0 2019     2019     CMP:  Lab Results   Component Value Date     2019    K 3.8 2019     2019    CO2 23 2019    BUN 11 2019    CREATININE 0.6 2019    GFRAA >60 2019    AGRATIO 1.5 2019    LABGLOM >60 2019    GLUCOSE 103 2019    PROT 7.1 2019    CALCIUM 9.4 2019    BILITOT 0.5 2019    ALKPHOS 73 2019    AST 18 2019    ALT 25 2019     PT/INR:   No results found for: PTINR  Liver:  No components found for: CHLPL  Lab Results   Component Value Date    ALT 25 2019    AST 18 2019    ALKPHOS 73 2019    BILITOT 0.5 2019     Lab Results   Component Value Date    LABA1C 6.3 2019     Lipids:         Lab Results   Component Value Date    TRIG 221 (H) 2019            Lab Results   Component Value Date    HDL 49 2019            Lab Results   Component Value Date    LDLCALC 115 (H) 2019            Lab Results   Component Value Date    LABVLDL 44 2019         CARDIAC DATA   EK2020 NSR        ECHO/MUGA:    STRESS TEST:     STRESS/ECHO ([facility]) 2016  Interpetation Summary: 1. Negative stress echo with no indication of inducible ischemia. 2.Negative ECG for ischemia with graded exercise test.   3.Calix Treadmill Score is 7 which indicates low risk. 4.Stress echo findings indicate low risk for future ischemic      event .     CARDIAC CATH:    VASCULAR/OTHER IMAGING:    Vascular Screening ( E) 7/30/19  Findings & Recommendations     No plaque seen during carotid artery screening. No need for follow up.      The blood pressure taken at this screening is above normal ranges described by the American Heart Association   guidelines. The      patient should follow this up with their primary care physician.      No evidence of abdominal aortic aneurysm.      Normal peripheral artery screening.      lower extremity duplex ([facility]) 6/6/17  Impression:   1. Normal venous evaluation, no evidence of acute superficial or deep venous      thrombosis in the left lower extremity. 2. No baker cyst visualized during the left popliteal evaluation. Assessment and Plan   Peter Mc is a 47 y.o. female who presents today for the following problems:      1. Palpitations,: sounds like PAC/PVC or short PAT    MD Plan:  1. TSH normal  2. Echo  3. 2 wk monitor   - d/w pt that if negative, Kardia or apple watch can help with \"rare episodes\"   - do not feel loop recorder needed at this time. Patient Active Problem List   Diagnosis    Malignant melanoma of thigh ([address]    Bunion of right foot    Ganglion cyst of right foot    Mixed hyperlipidemia    Benign essential HTN    Gastroesophageal reflux disease without esophagitis    Anxiety    Palpitations       Patient Plan:  1.  Echocardiogram to view size and strength of the heart   2.  2 week cardiac monitor  3. We will call you with the results        It is a pleasure to assist in the care of Peter Mc. Please call with any questions. This note was scribed in the presence of Sheila Christian MD by Graciela Archer RN.      Jason Moran MD, personally performed the services described in this documentation as scribed by the above signed scribe in my presence, and it is both accurate and complete to the best of our ability and knowledge.  I agree with the details independently gathered by my clinical support staff,

## 2020-06-12 NOTE — LETTER
Current Outpatient Medications   Medication Sig Dispense Refill    esomeprazole (NEXIUM) 20 MG delayed release capsule Take 1 capsule by mouth daily TAKE ONE CAPSULE BY MOUTH DAILY 30 capsule 3    amLODIPine (NORVASC) 5 MG tablet TAKE ONE TABLET BY MOUTH DAILY 90 tablet 1    atorvastatin (LIPITOR) 10 MG tablet Take 1 tablet by mouth daily TAKE ONE TABLET BY MOUTH DAILY 30 tablet 0    hydrochlorothiazide (HYDRODIURIL) 25 MG tablet Take 1 tablet by mouth daily 90 tablet 1    Cholecalciferol (VITAMIN D3) 5000 units TABS Take by mouth      Cyanocobalamin (VITAMIN B-12 PO) Take 1,200 mg by mouth daily      busPIRone (BUSPAR) 5 MG tablet Take 1 tablet by mouth 3 times daily as needed (anxiety) 90 tablet 1    propranolol (INDERAL) 10 MG tablet Take 1 tablet by mouth 3 times daily (Patient not taking: Reported on 6/12/2020) 90 tablet 3    aspirin 81 MG tablet Take 81 mg by mouth daily. No current facility-administered medications for this visit. Allergies:  Erythromycin     Review of Systems:   A 14 point review of symptoms completed. Pertinent positives identified in the HPI, all other review of symptoms negative as below.     Objective:   PHYSICAL EXAM:    Vitals:    06/12/20 1528   BP: 114/72   Pulse: 86   SpO2: 95%    Weight: 164 lb 8 oz (74.6 kg)     Wt Readings from Last 3 Encounters:   06/12/20 164 lb 8 oz (74.6 kg)   06/11/20 166 lb (75.3 kg)   11/27/19 158 lb (71.7 kg)         General Appearance:  Alert, cooperative, no distress, appears stated age   Head:  Normocephalic, atraumatic   Eyes:  PERRL, conjunctiva/corneas clear   Nose: Nares normal, no drainage or sinus tenderness   Throat: Lips, mucosa, and tongue normal   Neck: Supple, symmetrical, trachea midline, NL thyroid no carotid bruit or JVD   Lungs:   CTAB, respirations unlabored   Chest Wall:  No tenderness or deformity   Heart:  Regular rhythm and normal rate; S1, S2 are normal;   no murmur noted; no rub or gallop

## 2020-07-16 ENCOUNTER — TELEPHONE (OUTPATIENT)
Dept: CARDIOLOGY CLINIC | Age: 50
End: 2020-07-16

## 2020-07-16 NOTE — TELEPHONE ENCOUNTER
LMOVM to return call. Per Dr Ger Angel, cardiac monitor ordered by DR Narda Magallanes showed occasional extra heart beats. Can treat with BB or just observe.

## 2020-07-20 PROCEDURE — 0298T PR EXT ECG > 48HR TO 21 DAY REVIEW AND INTERPRETATN: CPT | Performed by: INTERNAL MEDICINE

## 2020-07-23 NOTE — TELEPHONE ENCOUNTER
Spoke with patient and voiced understanding. Will observe for now and will let us know if they become more frequent and or bothersome.

## 2020-08-11 ENCOUNTER — HOSPITAL ENCOUNTER (OUTPATIENT)
Dept: CARDIOLOGY | Age: 50
Discharge: HOME | End: 2020-08-11
Payer: COMMERCIAL

## 2020-08-11 LAB
LV EF: 55 %
LVEF MODALITY: NORMAL

## 2020-08-11 PROCEDURE — 93306 TTE W/DOPPLER COMPLETE: CPT

## 2020-08-12 ENCOUNTER — TELEPHONE (OUTPATIENT)
Dept: CARDIOLOGY CLINIC | Age: 50
End: 2020-08-12

## 2020-08-12 NOTE — TELEPHONE ENCOUNTER
Created telephone encounter. Spoke with Moira Lees relayed message per Landry Chau regarding echo. Pt verbalized understanding.

## 2020-08-12 NOTE — TELEPHONE ENCOUNTER
----- Message from Keisha Squires MD sent at 8/11/2020  4:52 PM EDT -----  Please let patient know that for the most part her echo was normal no significant valve problems or  chamber enlargement.   We will wait for monitor results for patient palpitations

## 2020-09-29 RX ORDER — ATORVASTATIN CALCIUM 10 MG/1
10 TABLET, FILM COATED ORAL DAILY
Qty: 90 TABLET | Refills: 3 | Status: SHIPPED | OUTPATIENT
Start: 2020-09-29 | End: 2021-09-21 | Stop reason: SDUPTHER

## 2020-11-11 ENCOUNTER — NURSE TRIAGE (OUTPATIENT)
Dept: OTHER | Facility: CLINIC | Age: 50
End: 2020-11-11

## 2020-11-11 ENCOUNTER — TELEPHONE (OUTPATIENT)
Dept: CARDIOLOGY CLINIC | Age: 50
End: 2020-11-11

## 2020-11-11 NOTE — TELEPHONE ENCOUNTER
Patient called stating her BP is running high today. She states her current BP is 140/107. Patient states the only symptoms are her ears are red and they feel hot.  Patient states she has some aneixty about personal issues going on but denies any other symptoms

## 2020-11-11 NOTE — TELEPHONE ENCOUNTER
Received call from [address]. Reason for Disposition   Systolic BP >= 052 OR Diastolic >= 735    Answer Assessment - Initial Assessment Questions  1. BLOOD PRESSURE: \"What is the blood pressure? \" \"Did you take at least two measurements 5 minutes apart?\"      141/107, pt is currently at work and had her BP taken in the clinic. Pt is not sure if her BP is higher than normal d/t stress. 2. ONSET: \"When did you take your blood pressure? \"      Pt had her BP taken right before she called    3. HOW: \"How did you obtain the blood pressure? \" (e.g., visiting nurse, automatic home BP monitor)   Automatic BP monitor     4. HISTORY: \"Do you have a history of high blood pressure? \"      Yes     5. MEDICATIONS: Giovanna Ripple you taking any medications for blood pressure? \" \"Have you missed any doses recently? \"      Pt is on Amlodipine. She has not missed any doses. 6. OTHER SYMPTOMS: \"Do you have any symptoms? \" (e.g., headache, chest pain, blurred vision, difficulty breathing, weakness)      Denies     7. PREGNANCY: \"Is there any chance you are pregnant? \" \"When was your last menstrual period? \"      N/a    Protocols used: HIGH BLOOD PRESSURE-ADULT-OH    Recommended that pt be seen within the next 3 days. Provided pt with care advice. Call soft transferred to [facility]. to schedule appointment. Attention Provider: Thank you for allowing me to participate in the care of your patient. The  patient was connected to triage in response to information provided to the [facility]. Please do not respond through this encounter as the response is not directed to a shared pool.

## 2020-11-11 NOTE — TELEPHONE ENCOUNTER
Lets have her take an additional dose of amlodipine 5 mg now and have her change her prescription to 10 mg p.o. daily. Thank you.

## 2020-11-12 ENCOUNTER — OFFICE VISIT (OUTPATIENT)
Dept: FAMILY MEDICINE CLINIC | Age: 50
End: 2020-11-12
Payer: COMMERCIAL

## 2020-11-12 VITALS
HEART RATE: 97 BPM | BODY MASS INDEX: 30.41 KG/M2 | OXYGEN SATURATION: 97 % | SYSTOLIC BLOOD PRESSURE: 112 MMHG | DIASTOLIC BLOOD PRESSURE: 80 MMHG | WEIGHT: 162 LBS | TEMPERATURE: 97.5 F

## 2020-11-12 LAB
A/G RATIO: 1.4 (ref 1.1–2.2)
ALBUMIN SERPL-MCNC: 4.1 G/DL (ref 3.4–5)
ALP BLD-CCNC: 82 U/L (ref 40–129)
ALT SERPL-CCNC: 24 U/L (ref 10–40)
ANION GAP SERPL CALCULATED.3IONS-SCNC: 12 MMOL/L (ref 3–16)
AST SERPL-CCNC: 20 U/L (ref 15–37)
BILIRUB SERPL-MCNC: 0.7 MG/DL (ref 0–1)
BUN BLDV-MCNC: 10 MG/DL (ref 7–20)
CALCIUM SERPL-MCNC: 9.4 MG/DL (ref 8.3–10.6)
CHLORIDE BLD-SCNC: 98 MMOL/L (ref 99–110)
CHOLESTEROL, TOTAL: 192 MG/DL (ref 0–199)
CO2: 26 MMOL/L (ref 21–32)
CREAT SERPL-MCNC: 0.6 MG/DL (ref 0.6–1.1)
GFR AFRICAN AMERICAN: >60
GFR NON-AFRICAN AMERICAN: >60
GLOBULIN: 2.9 G/DL
GLUCOSE BLD-MCNC: 110 MG/DL (ref 70–99)
HBA1C MFR BLD: 6.4 %
HDLC SERPL-MCNC: 60 MG/DL (ref 40–60)
LDL CHOLESTEROL CALCULATED: 109 MG/DL
POTASSIUM SERPL-SCNC: 3.6 MMOL/L (ref 3.5–5.1)
SODIUM BLD-SCNC: 136 MMOL/L (ref 136–145)
TOTAL PROTEIN: 7 G/DL (ref 6.4–8.2)
TRIGL SERPL-MCNC: 114 MG/DL (ref 0–150)
VLDLC SERPL CALC-MCNC: 23 MG/DL

## 2020-11-12 PROCEDURE — 83036 HEMOGLOBIN GLYCOSYLATED A1C: CPT | Performed by: NURSE PRACTITIONER

## 2020-11-12 PROCEDURE — 99396 PREV VISIT EST AGE 40-64: CPT | Performed by: NURSE PRACTITIONER

## 2020-11-12 PROCEDURE — 36415 COLL VENOUS BLD VENIPUNCTURE: CPT | Performed by: NURSE PRACTITIONER

## 2020-11-12 RX ORDER — ESCITALOPRAM OXALATE 10 MG/1
TABLET ORAL
Qty: 30 TABLET | Refills: 3 | Status: SHIPPED | OUTPATIENT
Start: 2020-11-12 | End: 2021-03-04 | Stop reason: ALTCHOICE

## 2020-11-12 ASSESSMENT — PATIENT HEALTH QUESTIONNAIRE - PHQ9
SUM OF ALL RESPONSES TO PHQ QUESTIONS 1-9: 0
SUM OF ALL RESPONSES TO PHQ QUESTIONS 1-9: 0
1. LITTLE INTEREST OR PLEASURE IN DOING THINGS: 0
SUM OF ALL RESPONSES TO PHQ9 QUESTIONS 1 & 2: 0
SUM OF ALL RESPONSES TO PHQ QUESTIONS 1-9: 0
2. FEELING DOWN, DEPRESSED OR HOPELESS: 0

## 2020-11-12 ASSESSMENT — ENCOUNTER SYMPTOMS
CONSTIPATION: 0
SORE THROAT: 0
SHORTNESS OF BREATH: 0
WHEEZING: 0
DIARRHEA: 1
ABDOMINAL PAIN: 0

## 2020-11-12 NOTE — PROGRESS NOTES
Patient: Melany Celis is a 45 y.o. female who presents today with the following Chief Complaint(s):  Chief Complaint   Patient presents with    Hypertension     Stress related highest blood pressure last night was 156/93 started noticing it yesterday          HPI   Here for physical exam.     HTN: last night BP was 156/93. Has been feeling stressed/anxious at work. Gets palpitations off and on. Daughter plays golf and she gets anxious days before she plays. Has tried buspar. Current Outpatient Medications   Medication Sig Dispense Refill    escitalopram (LEXAPRO) 10 MG tablet Take 1/2 tablet nightly for a week and then take full tablet nightly 30 tablet 3    atorvastatin (LIPITOR) 10 MG tablet Take 1 tablet by mouth daily 90 tablet 3    amLODIPine (NORVASC) 5 MG tablet TAKE ONE TABLET BY MOUTH DAILY 90 tablet 0    esomeprazole (NEXIUM) 20 MG delayed release capsule Take 1 capsule by mouth daily TAKE ONE CAPSULE BY MOUTH DAILY 30 capsule 3    hydroCHLOROthiazide (HYDRODIURIL) 25 MG tablet Take 1 tablet by mouth daily 90 tablet 0    Cholecalciferol (VITAMIN D3) 5000 units TABS Take by mouth      Cyanocobalamin (VITAMIN B-12 PO) Take 1,200 mg by mouth daily      busPIRone (BUSPAR) 5 MG tablet Take 1 tablet by mouth 3 times daily as needed (anxiety) (Patient not taking: Reported on 11/12/2020) 90 tablet 1    aspirin 81 MG tablet Take 81 mg by mouth daily. No current facility-administered medications for this visit. Patient's past medical history, surgical history, family history, medications,  andallergies  were all reviewed and updated as appropriate today. Review of Systems   Constitutional: Negative for chills and fever. HENT: Negative for congestion and sore throat. Eyes: Negative for visual disturbance. Wears glasses   Respiratory: Negative for shortness of breath and wheezing. Cardiovascular: Positive for palpitations. Negative for chest pain. Content: Thought content normal.         Judgment: Judgment normal.       Vitals:    11/12/20 0732   BP: 112/80   Pulse:    Temp:    SpO2:        Assessment:  Encounter Diagnoses   Name Primary?  Physical exam Yes    Screening for colon cancer     Anxiety        Plan:  1. Physical exam    - Comprehensive Metabolic Panel  - Lipid Panel  - POCT glycosylated hemoglobin (Hb A1C)    2. Screening for colon cancer    - Cologuard (For External Results Only); Future    3. Anxiety  Can try buspar as well  - escitalopram (LEXAPRO) 10 MG tablet; Take 1/2 tablet nightly for a week and then take full tablet nightly  Dispense: 30 tablet; Refill: 3        Return in about 1 month (around 12/12/2020) for f/u mood.

## 2020-11-13 LAB
ESTIMATED AVERAGE GLUCOSE: 131.2 MG/DL
HBA1C MFR BLD: 6.2 %

## 2020-11-13 NOTE — TELEPHONE ENCOUNTER
Patient returned call.  No one available to take call but patient stated she was seen by PCP and does not need our office to call her back

## 2020-11-15 ENCOUNTER — PATIENT MESSAGE (OUTPATIENT)
Dept: FAMILY MEDICINE CLINIC | Age: 50
End: 2020-11-15

## 2020-11-16 RX ORDER — HYDROCHLOROTHIAZIDE 25 MG/1
25 TABLET ORAL DAILY
Qty: 90 TABLET | Refills: 0 | Status: SHIPPED | OUTPATIENT
Start: 2020-11-16 | End: 2021-04-02 | Stop reason: ALTCHOICE

## 2020-11-16 NOTE — TELEPHONE ENCOUNTER
From: Sandor Christian  To: Ildefonso Bailey, THAO - CNP  Sent: 11/15/2020 8:59 AM EST  Subject: Prescription Question    [facility] will not allow me to request a refill of hydrochlorothiazide, which I will be out of on Tuesday. Can you please send in a new prescription at some point early this week? Thank you so much!

## 2020-12-11 ENCOUNTER — HOSPITAL ENCOUNTER (OUTPATIENT)
Dept: MAMMOGRAPHY | Age: 50
Discharge: HOME | End: 2020-12-11
Payer: COMMERCIAL

## 2020-12-11 PROCEDURE — 77063 BREAST TOMOSYNTHESIS BI: CPT

## 2020-12-16 ENCOUNTER — OFFICE VISIT (OUTPATIENT)
Dept: PRIMARY CARE CLINIC | Age: 50
End: 2020-12-16
Payer: COMMERCIAL

## 2020-12-16 PROCEDURE — 99211 OFF/OP EST MAY X REQ PHY/QHP: CPT | Performed by: NURSE PRACTITIONER

## 2020-12-16 NOTE — PATIENT INSTRUCTIONS

## 2020-12-16 NOTE — PROGRESS NOTES
Darrick Garcia received a viral test for COVID-19. They were educated on isolation and quarantine as appropriate. For any symptoms, they were directed to seek care from their PCP, given contact information to establish with a doctor, directed to an urgent care or the emergency room.

## 2020-12-17 LAB — SARS-COV-2, NAA: DETECTED

## 2020-12-22 ENCOUNTER — PATIENT MESSAGE (OUTPATIENT)
Dept: FAMILY MEDICINE CLINIC | Age: 50
End: 2020-12-22

## 2020-12-22 RX ORDER — ONDANSETRON 4 MG/1
4 TABLET, FILM COATED ORAL 3 TIMES DAILY PRN
Qty: 30 TABLET | Refills: 0 | Status: SHIPPED | OUTPATIENT
Start: 2020-12-22 | End: 2021-10-15

## 2020-12-22 NOTE — TELEPHONE ENCOUNTER
From: Tahira Ling  To: Jennifer Magallanes, APRN - CNP  Sent: 12/22/2020 4:03 PM EST  Subject: Non-Urgent Medical Question    I tested positive for covid last week, as did my youngest daughter. We've been in our basement, away from the rest of the family. I. Didn't feel too badly to begin with but I've been very nauseated the last 3 days. Everything I eat goes right through me (toast, jello, popsicles) and I'm most comfortable just laying down. I've slept a ton. I'm at day 7 right now. Is this normal or should I be doing anything differently?   Thank you

## 2020-12-25 ENCOUNTER — HOSPITAL ENCOUNTER (EMERGENCY)
Age: 50
Discharge: HOME | End: 2020-12-25
Attending: EMERGENCY MEDICINE
Payer: COMMERCIAL

## 2020-12-25 VITALS
OXYGEN SATURATION: 97 % | SYSTOLIC BLOOD PRESSURE: 125 MMHG | DIASTOLIC BLOOD PRESSURE: 87 MMHG | HEART RATE: 99 BPM | TEMPERATURE: 98.7 F | RESPIRATION RATE: 18 BRPM

## 2020-12-25 LAB
A/G RATIO: 1 (ref 1.1–2.2)
ALBUMIN SERPL-MCNC: 4.2 G/DL (ref 3.4–5)
ALP BLD-CCNC: 87 U/L (ref 40–129)
ALT SERPL-CCNC: 44 U/L (ref 10–40)
ANION GAP SERPL CALCULATED.3IONS-SCNC: 14 MMOL/L (ref 3–16)
AST SERPL-CCNC: 45 U/L (ref 15–37)
BACTERIA: ABNORMAL /HPF
BASOPHILS ABSOLUTE: 0 K/UL (ref 0–0.2)
BASOPHILS RELATIVE PERCENT: 0.4 %
BILIRUB SERPL-MCNC: 0.5 MG/DL (ref 0–1)
BILIRUBIN URINE: ABNORMAL
BLOOD, URINE: NEGATIVE
BUN BLDV-MCNC: 10 MG/DL (ref 7–20)
CALCIUM SERPL-MCNC: 9.4 MG/DL (ref 8.3–10.6)
CHLORIDE BLD-SCNC: 93 MMOL/L (ref 99–110)
CLARITY: CLEAR
CO2: 27 MMOL/L (ref 21–32)
COLOR: YELLOW
CREAT SERPL-MCNC: 0.7 MG/DL (ref 0.6–1.1)
EOSINOPHILS ABSOLUTE: 0 K/UL (ref 0–0.6)
EOSINOPHILS RELATIVE PERCENT: 0.1 %
EPITHELIAL CELLS, UA: ABNORMAL /HPF (ref 0–5)
GFR AFRICAN AMERICAN: >60
GFR NON-AFRICAN AMERICAN: >60
GLOBULIN: 4.2 G/DL
GLUCOSE BLD-MCNC: 155 MG/DL (ref 70–99)
GLUCOSE URINE: NEGATIVE MG/DL
HCG QUALITATIVE: NEGATIVE
HCT VFR BLD CALC: 54.1 % (ref 36–48)
HEMOGLOBIN: 18.2 G/DL (ref 12–16)
KETONES, URINE: NEGATIVE MG/DL
LEUKOCYTE ESTERASE, URINE: NEGATIVE
LIPASE: 27 U/L (ref 13–60)
LYMPHOCYTES ABSOLUTE: 1.5 K/UL (ref 1–5.1)
LYMPHOCYTES RELATIVE PERCENT: 20.7 %
MCH RBC QN AUTO: 27.8 PG (ref 26–34)
MCHC RBC AUTO-ENTMCNC: 33.6 G/DL (ref 31–36)
MCV RBC AUTO: 82.8 FL (ref 80–100)
MICROSCOPIC EXAMINATION: YES
MONOCYTES ABSOLUTE: 1.2 K/UL (ref 0–1.3)
MONOCYTES RELATIVE PERCENT: 16.4 %
MUCUS: ABNORMAL /LPF
NEUTROPHILS ABSOLUTE: 4.4 K/UL (ref 1.7–7.7)
NEUTROPHILS RELATIVE PERCENT: 62.4 %
NITRITE, URINE: NEGATIVE
PDW BLD-RTO: 12.6 % (ref 12.4–15.4)
PH UA: 6.5 (ref 5–8)
PLATELET # BLD: 241 K/UL (ref 135–450)
PMV BLD AUTO: 9.1 FL (ref 5–10.5)
POTASSIUM SERPL-SCNC: 3.8 MMOL/L (ref 3.5–5.1)
PROTEIN UA: 100 MG/DL
RBC # BLD: 6.54 M/UL (ref 4–5.2)
RBC UA: ABNORMAL /HPF (ref 0–4)
SODIUM BLD-SCNC: 134 MMOL/L (ref 136–145)
SPECIFIC GRAVITY UA: 1.02 (ref 1–1.03)
TOTAL PROTEIN: 8.4 G/DL (ref 6.4–8.2)
TROPONIN: <0.01 NG/ML
URINE TYPE: ABNORMAL
UROBILINOGEN, URINE: 0.2 E.U./DL
WBC # BLD: 7.1 K/UL (ref 4–11)
WBC UA: ABNORMAL /HPF (ref 0–5)

## 2020-12-25 PROCEDURE — 80053 COMPREHEN METABOLIC PANEL: CPT

## 2020-12-25 PROCEDURE — 96374 THER/PROPH/DIAG INJ IV PUSH: CPT

## 2020-12-25 PROCEDURE — 99282 EMERGENCY DEPT VISIT SF MDM: CPT

## 2020-12-25 PROCEDURE — 93005 ELECTROCARDIOGRAM TRACING: CPT | Performed by: NURSE PRACTITIONER

## 2020-12-25 PROCEDURE — 83690 ASSAY OF LIPASE: CPT

## 2020-12-25 PROCEDURE — 6360000002 HC RX W HCPCS: Performed by: NURSE PRACTITIONER

## 2020-12-25 PROCEDURE — 2580000003 HC RX 258: Performed by: NURSE PRACTITIONER

## 2020-12-25 PROCEDURE — 81001 URINALYSIS AUTO W/SCOPE: CPT

## 2020-12-25 PROCEDURE — 36415 COLL VENOUS BLD VENIPUNCTURE: CPT

## 2020-12-25 PROCEDURE — 85025 COMPLETE CBC W/AUTO DIFF WBC: CPT

## 2020-12-25 PROCEDURE — 84484 ASSAY OF TROPONIN QUANT: CPT

## 2020-12-25 PROCEDURE — 84703 CHORIONIC GONADOTROPIN ASSAY: CPT

## 2020-12-25 RX ORDER — 0.9 % SODIUM CHLORIDE 0.9 %
1000 INTRAVENOUS SOLUTION INTRAVENOUS ONCE
Status: COMPLETED | OUTPATIENT
Start: 2020-12-25 | End: 2020-12-25

## 2020-12-25 RX ORDER — ONDANSETRON 2 MG/ML
4 INJECTION INTRAMUSCULAR; INTRAVENOUS ONCE
Status: COMPLETED | OUTPATIENT
Start: 2020-12-25 | End: 2020-12-25

## 2020-12-25 RX ADMIN — ONDANSETRON 4 MG: 2 INJECTION INTRAMUSCULAR; INTRAVENOUS at 15:08

## 2020-12-25 RX ADMIN — SODIUM CHLORIDE 1000 ML: 9 INJECTION, SOLUTION INTRAVENOUS at 15:08

## 2020-12-25 ASSESSMENT — PAIN DESCRIPTION - LOCATION: LOCATION: GENERALIZED

## 2020-12-25 ASSESSMENT — PAIN DESCRIPTION - PAIN TYPE: TYPE: ACUTE PAIN

## 2020-12-25 ASSESSMENT — PAIN SCALES - GENERAL: PAINLEVEL_OUTOF10: 3

## 2020-12-25 NOTE — ED PROVIDER NOTES
[facility] Emergency Department    CHIEF COMPLAINT  Nausea (nausea for a couple days, cant keep anything day, COVID pos 16th) and Fatigue      SHARED SERVICE VISIT  I have seen and evaluated this patient with my supervising physician, Dr. Ángela Gutierrez. HISTORY OF PRESENT ILLNESS  Av Farr is a 45 y.o. female diagnosed with COVID-19 here on 12/16/2020 who presents to the ED complaining of a 5-day history of nausea, vomiting, diarrhea, and inability to tolerate food and drink due to nausea. States that she has attempted to eat crackers and even these caused her to vomit. She does endorse postnasal drip which seems to exacerbate her nausea which is worse in the a.m. Denies chest pain, abdominal pain, shortness of breath, leg/calf pain or swelling, fever, chills, sweats, headache, body aches, or other complaints. Reports nausea and diarrhea x5 today. Denies vomiting today. No other complaints, modifying factors or associated symptoms. Nursing notes reviewed.    Past Medical History:   Diagnosis Date    Anxiety     Diabetes ([facility] [address]     GERD (gastroesophageal reflux disease)     Hyperlipidemia     Hypertension     Melanoma ([facility] [address] [facility]      Past Surgical History:   Procedure Laterality Date    HYSTERECTOMY, TOTAL ABDOMINAL      Has ovaries    TONSILLECTOMY       Family History   Problem Relation Age of Onset    Heart Disease Father     High Blood Pressure Father     Macular Degen Mother     Diabetes Mother     High Cholesterol Mother     High Cholesterol Brother     Diabetes Maternal Grandmother     Heart Disease Maternal Grandfather     Cancer Paternal Grandmother     Heart Disease Paternal Grandfather      Social History     Socioeconomic History    Marital status:      Spouse name: Not on file    Number of children: Not on file    Years of education: Not on file    Highest education level: Not on file   Occupational History    Not on file Social Needs    Financial resource strain: Not on file    Food insecurity     Worry: Not on file     Inability: Not on file    Transportation needs     Medical: Not on file     Non-medical: Not on file   Tobacco Use    Smoking status: Never Smoker    Smokeless tobacco: Never Used   Substance and Sexual Activity    Alcohol use: Yes    Drug use: No    Sexual activity: Not on file   Lifestyle    Physical activity     Days per week: Not on file     Minutes per session: Not on file    Stress: Not on file   Relationships    Social connections     Talks on phone: Not on file     Gets together: Not on file     Attends Zoroastrian service: Not on file     Active member of club or organization: Not on file     Attends meetings of clubs or organizations: Not on file     Relationship status: Not on file    Intimate partner violence     Fear of current or ex partner: Not on file     Emotionally abused: Not on file     Physically abused: Not on file     Forced sexual activity: Not on file   Other Topics Concern    Not on file   Social History Narrative    Not on file     No current facility-administered medications for this encounter.       Current Outpatient Medications   Medication Sig Dispense Refill    ondansetron (ZOFRAN) 4 MG tablet Take 1 tablet by mouth 3 times daily as needed for Nausea or Vomiting 30 tablet 0    hydroCHLOROthiazide (HYDRODIURIL) 25 MG tablet Take 1 tablet by mouth daily 90 tablet 0    escitalopram (LEXAPRO) 10 MG tablet Take 1/2 tablet nightly for a week and then take full tablet nightly 30 tablet 3    atorvastatin (LIPITOR) 10 MG tablet Take 1 tablet by mouth daily 90 tablet 3    amLODIPine (NORVASC) 5 MG tablet TAKE ONE TABLET BY MOUTH DAILY 90 tablet 0    esomeprazole (NEXIUM) 20 MG delayed release capsule Take 1 capsule by mouth daily TAKE ONE CAPSULE BY MOUTH DAILY 30 capsule 3    Cholecalciferol (VITAMIN D3) 5000 units TABS Take by mouth No mammographic evidence of malignancy. BIRADS: BIRADS - CATEGORY 1 Negative mammogram.  Normal interval follow-up is recommended in 12 months. OVERALL ASSESSMENT - NEGATIVE A letter of notification will be sent to the patient regarding the results. The [employer] recommends annual mammograms for women 40 years and older. ED COURSE  Patient not require analgesia while in the emergency department. Triage vitals stable. A cardiac/abdominal workup was initiated including troponin, lipase, CMP, and CBC. Interventions: Patient was medicated with Zofran 4 mg IV and given a liter bolus of normal saline.         I have reviewed and interpreted all of the currently available lab results from this visit:  Results for orders placed or performed during the hospital encounter of 12/25/20   CBC Auto Differential   Result Value Ref Range    WBC 7.1 4.0 - 11.0 K/uL    RBC 6.54 (H) 4.00 - 5.20 M/uL    Hemoglobin 18.2 (H) 12.0 - 16.0 g/dL    Hematocrit 54.1 (H) 36.0 - 48.0 %    MCV 82.8 80.0 - 100.0 fL    MCH 27.8 26.0 - 34.0 pg    MCHC 33.6 31.0 - 36.0 g/dL    RDW 12.6 12.4 - 15.4 %    Platelets 207 995 - 568 K/uL    MPV 9.1 5.0 - 10.5 fL    Neutrophils % 62.4 %    Lymphocytes % 20.7 %    Monocytes % 16.4 %    Eosinophils % 0.1 %    Basophils % 0.4 %    Neutrophils Absolute 4.4 1.7 - 7.7 K/uL    Lymphocytes Absolute 1.5 1.0 - 5.1 K/uL    Monocytes Absolute 1.2 0.0 - 1.3 K/uL    Eosinophils Absolute 0.0 0.0 - 0.6 K/uL    Basophils Absolute 0.0 0.0 - 0.2 K/uL   Comprehensive Metabolic Panel   Result Value Ref Range    Sodium 134 (L) 136 - 145 mmol/L    Potassium 3.8 3.5 - 5.1 mmol/L    Chloride 93 (L) 99 - 110 mmol/L    CO2 27 21 - 32 mmol/L    Anion Gap 14 3 - 16    Glucose 155 (H) 70 - 99 mg/dL    BUN 10 7 - 20 mg/dL    CREATININE 0.7 0.6 - 1.1 mg/dL    GFR Non-African American >60 >60    GFR African American >60 >60    Calcium 9.4 8.3 - 10.6 mg/dL    Total Protein 8.4 (H) 6.4 - 8.2 g/dL Alb 4.2 3.4 - 5.0 g/dL    Albumin/Globulin Ratio 1.0 (L) 1.1 - 2.2    Total Bilirubin 0.5 0.0 - 1.0 mg/dL    Alkaline Phosphatase 87 40 - 129 U/L    ALT 44 (H) 10 - 40 U/L    AST 45 (H) 15 - 37 U/L    Globulin 4.2 g/dL   Urinalysis   Result Value Ref Range    Color, UA Yellow Straw/Yellow    Clarity, UA Clear Clear    Glucose, Ur Negative Negative mg/dL    Bilirubin Urine SMALL (A) Negative    Ketones, Urine Negative Negative mg/dL    Specific Gravity, UA 1.025 1.005 - 1.030    Blood, Urine Negative Negative    pH, UA 6.5 5.0 - 8.0    Protein,  (A) Negative mg/dL    Urobilinogen, Urine 0.2 <2.0 E.U./dL    Nitrite, Urine Negative Negative    Leukocyte Esterase, Urine Negative Negative    Microscopic Examination YES     Urine Type NotGiven    Lipase   Result Value Ref Range    Lipase 27.0 13.0 - 60.0 U/L   HCG Qualitative, Serum   Result Value Ref Range    hCG Qual Negative Detects HCG level >10 MIU/mL   Troponin   Result Value Ref Range    Troponin <0.01 <0.01 ng/mL   Microscopic Urinalysis   Result Value Ref Range    Mucus, UA Rare (A) None Seen /LPF    WBC, UA 3-5 0 - 5 /HPF    RBC, UA 3-4 0 - 4 /HPF    Epithelial Cells, UA 6-10 (A) 0 - 5 /HPF    Bacteria, UA 1+ (A) None Seen /HPF   EKG 12 Lead   Result Value Ref Range    Ventricular Rate 79 BPM    Atrial Rate 79 BPM    P-R Interval 134 ms    QRS Duration 78 ms    Q-T Interval 502 ms    QTc Calculation (Bazett) 575 ms    P Axis 48 degrees    R Axis 9 degrees    T Axis 44 degrees    Diagnosis       Normal sinus rhythmCannot rule out Anterior infarct , age undeterminedProlonged QTAbnormal ECGNo previous ECGs available           Imaging ordered:  None          Reevaluation:  Patient reports that she has complete resolution of the nausea and that she has not \"felt this good in the past week\". Patient was given a p.o. challenge with crackers and water. She passed the p.o. challenge. A discussion was had with Mrs. Thomson regarding non-intractable vomiting with nausea and intention to discharge. Risk management discussed and shared decision making had with patient and/or surrogate. All questions were answered. Patient will follow up with her PCP for further evaluation/treatment. All questions answered. Patient will return to ED for new/worsening symptoms. Patient is agreeable with this plan. Patient was not sent home with prescriptions. Considered prescribing Zofran for home however the patient states that she has Zofran there. (States that she only tried 1 dose of Zofran without success so she discontinued taking). CRITICAL CARE TIME  0 Minutes of critical care time spent not including separately billable procedures. MDM  Patient presents to emergency department with non-intractable vomiting with nausea. Alternate diagnoses are less likely based on history and physical. Considered ACS, pregnancy, UTI, and sepsis but less likely based on history and physical.  She has not tolerated p.o. crackers and water. Therefore my attending physician and I feel that the patient is safe and appropriate for discharge to home with outpatient follow-up with PCP in the next 1-2 days as there is low suspicion for ACS, pregnancy, UTI, sepsis, or other acute processes. Patient will be treated with previously prescribed Zofran and is instructed to return to the emergency department immediately for new or worsening symptoms including abdominal pain, ability to tolerate food and drink,. Patient verbalizes understanding and is agreeable with plan for discharge and follow-up. Clinical Impression:  Non-intractable vomiting with nausea  Diarrhea  COVID 19       DISPOSITION  Discharged      This chart was created using Dragon dictation. Every effort was made by myself to ensure accuracy, however due to limitations of this technology errors may be present.        Clovis Burr, APRN - CNP 12/27/20 1230

## 2020-12-25 NOTE — ED PROVIDER NOTES
Emergency Department Attending Provider Note  Location: [facility]  ED  12/25/2020     Patient Identification  Angel Rosario is a 45 y.o. female      Angel Rosario was evaluated in the Emergency Department for persistent nausea vomiting diarrhea in the setting of COVID-19 diagnosis from last week. States she has difficulty keeping down solids however she is tolerating fluids and crackers. No respiratory symptoms no lightheadedness or syncope. No abdominal pain. Passing stool passing flatus no urinary symptoms. .     Physical exam revealed:  Vital signs reviewed  Gen: Alert and oriented, no acute distress  Card: RRR, no murmurs, equal radial pulses  Resp: CBSBL, no wheezes rales or rhonchi  Abd: Soft nontender, nondistended abdomen, no guarding or rebound, no CVA tenderness  Ext: No deformities noted  Neuro: Grossly normal moving extremities equally      Patient seen and evaluated. Relevant records reviewed. 34-year-old female history of hypertension otherwise generally healthy who presents with nausea vomiting diarrhea in the setting of COVID-19 diagnosis. Prior to my exam she received Zofran and IV fluids and she states that her symptoms are \"so much better at this is the best I felt in 1 week\". Reassuring exam.  Awaiting labs and anticipate discharge if no significant derangements requiring change in disposition. Although initial history and physical exam information was obtained by RAJNI/NPP/MD/DO (who also dictated a record of this visit), I independently examined and evaluated this patient and made all diagnostic, treatment, and disposition decisions. This chart was generated in part by using Dragon Dictation system and may contain errors related to that system including errors in grammar, punctuation, and spelling, as well as words and phrases that may be inappropriate. If there are any questions or concerns please feel free to contact the dictating provider for clarification.      Zee Stratton MD  [address] Cochran MD  12/25/20 [id number] [facility] MD [place]  12/25/20 2845

## 2020-12-26 ENCOUNTER — CARE COORDINATION (OUTPATIENT)
Dept: CARE COORDINATION | Age: 50
End: 2020-12-26

## 2020-12-26 LAB
EKG ATRIAL RATE: 79 BPM
EKG DIAGNOSIS: NORMAL
EKG P AXIS: 48 DEGREES
EKG P-R INTERVAL: 134 MS
EKG Q-T INTERVAL: 502 MS
EKG QRS DURATION: 78 MS
EKG QTC CALCULATION (BAZETT): 575 MS
EKG R AXIS: 9 DEGREES
EKG T AXIS: 44 DEGREES
EKG VENTRICULAR RATE: 79 BPM

## 2020-12-26 PROCEDURE — 93010 ELECTROCARDIOGRAM REPORT: CPT | Performed by: INTERNAL MEDICINE

## 2020-12-26 NOTE — CARE COORDINATION
Patient contacted regarding [id number] diagnosis\". Discussed COVID-19 related testing which was Reports was positive  at this time. Test results were see above. Patient informed of results, if available? Yes pt reports    Care Transition Nurse/ Ambulatory Care Manager contacted the patient by telephone to perform post discharge assessment. Call within 2 business days of discharge: Yes. Verified name and  with patient as identifiers. Provided introduction to self, and explanation of the CTN/ACM role, and reason for call due to risk factors for infection and/or exposure to COVID-19. Symptoms reviewed with patient who verbalized the following symptoms: no new symptoms and no worsening symptoms. Due to no new or worsening symptoms encounter was not routed to provider for escalation. Discussed follow-up appointments. If no appointment was previously scheduled, appointment scheduling offered: will call  [facility] follow up appointment(s):   Future Appointments   Date Time Provider Christiano Huynh   2021  2:40 PM Albino Danielle, APRN [facility]     Non-[facility] follow up appointment(s): na  Non-face-to-face services provided:  see above     Advance Care Planning:   Does patient have an Advance Directive:  not on file. Patient has following risk factors of: no known risk factors. CTN/ACM reviewed discharge instructions, medical action plan and red flags such as increased shortness of breath, increasing fever and signs of decompensation with patient who verbalized understanding. Discussed exposure protocols and quarantine with CDC Guidelines What to do if you are sick with coronavirus disease .  Patient was given an opportunity for questions and concerns. The patient agrees to contact the [employer] exposure line [phone], [facility] [place]: ([phone]) and PCP office for questions related to their healthcare.  CTN/ACM provided contact information for future needs. Reviewed and educated patient on any new and changed medications related to discharge diagnosis     Patient/family/caregiver given information for GetWell Loop and agrees to enroll yes  Patient's preferred e-mail:  [email or web address]  Patient's preferred phone number: [phone]  Based on Loop alert triggers, patient will be contacted by nurse care manager for worsening symptoms. Pt will be further monitored by COVID Loop Team based on severity of symptoms and risk factors. Reports is feeling better today.

## 2020-12-28 ENCOUNTER — TELEPHONE (OUTPATIENT)
Dept: FAMILY MEDICINE CLINIC | Age: 50
End: 2020-12-28

## 2020-12-28 ENCOUNTER — NURSE TRIAGE (OUTPATIENT)
Dept: OTHER | Facility: CLINIC | Age: 50
End: 2020-12-28

## 2020-12-28 RX ORDER — BENZONATATE 200 MG/1
200 CAPSULE ORAL 3 TIMES DAILY PRN
Qty: 30 CAPSULE | Refills: 0 | Status: SHIPPED | OUTPATIENT
Start: 2020-12-28 | End: 2021-01-07

## 2020-12-29 ENCOUNTER — PATIENT MESSAGE (OUTPATIENT)
Dept: FAMILY MEDICINE CLINIC | Age: 50
End: 2020-12-29

## 2020-12-29 NOTE — TELEPHONE ENCOUNTER
From: Varun Menendez  To: THAO Montoya - CNP  Sent: 12/29/2020 8:11 AM EST  Subject: Visit Follow-Up Question    We talked by phone last night, just wondering if we are keeping the virtual visit today at [phone] or if yesterday's phone call took the place of that. Thank you!

## 2020-12-29 NOTE — TELEPHONE ENCOUNTER
Spoke to patient about her nausea and diarrhea- has not had diarrhea today. Has felt a little bit better. Ate a couple bites of soup today. Eating ice chips, part of a popsicle. Has zofran PRN. Has a dry cough when she talks more- has tried some mucinex and delsym. no SOB. Will send in tessalon pearls. Encouraged her to hydrate. Take zofran PRN, continue to check BP- can hold med for now. BP have been low at home.

## 2021-01-07 ENCOUNTER — OFFICE VISIT (OUTPATIENT)
Dept: PRIMARY CARE CLINIC | Age: 51
End: 2021-01-07
Payer: COMMERCIAL

## 2021-01-07 VITALS
DIASTOLIC BLOOD PRESSURE: 78 MMHG | TEMPERATURE: 98 F | OXYGEN SATURATION: 99 % | RESPIRATION RATE: 18 BRPM | SYSTOLIC BLOOD PRESSURE: 123 MMHG | HEART RATE: 72 BPM

## 2021-01-07 DIAGNOSIS — I95.9 HYPOTENSION, UNSPECIFIED HYPOTENSION TYPE: ICD-10-CM

## 2021-01-07 DIAGNOSIS — G93.31 POSTVIRAL FATIGUE SYNDROME: ICD-10-CM

## 2021-01-07 DIAGNOSIS — U07.1 COVID-19: Primary | ICD-10-CM

## 2021-01-07 DIAGNOSIS — R05.9 COUGH: ICD-10-CM

## 2021-01-07 DIAGNOSIS — E86.0 DEHYDRATION: ICD-10-CM

## 2021-01-07 DIAGNOSIS — I10 BENIGN ESSENTIAL HTN: ICD-10-CM

## 2021-01-07 PROCEDURE — 99203 OFFICE O/P NEW LOW 30 MIN: CPT | Performed by: PHYSICIAN ASSISTANT

## 2021-01-07 ASSESSMENT — ENCOUNTER SYMPTOMS
ABDOMINAL PAIN: 0
NAUSEA: 1
COUGH: 1
VOMITING: 0
RHINORRHEA: 0
EYE DISCHARGE: 0
DIARRHEA: 1
CHEST TIGHTNESS: 0
EYE REDNESS: 0
SINUS PAIN: 0
CONSTIPATION: 0
SHORTNESS OF BREATH: 1
SORE THROAT: 0
SINUS PRESSURE: 0

## 2021-01-07 NOTE — PROGRESS NOTES
esomeprazole (NEXIUM) 20 MG delayed release capsule Take 1 capsule by mouth daily TAKE ONE CAPSULE BY MOUTH DAILY  Thomas Maizes, APRN - CNP   benzonatate (TESSALON) 200 MG capsule Take 1 capsule by mouth 3 times daily as needed for Cough  Thomas Maizes, APRN - CNP   ondansetron (ZOFRAN) 4 MG tablet Take 1 tablet by mouth 3 times daily as needed for Nausea or Vomiting  Teena De La Cruz, APRN - CNP   hydroCHLOROthiazide (HYDRODIURIL) 25 MG tablet Take 1 tablet by mouth daily  Isabella Ivy, APRN - CNP   escitalopram (LEXAPRO) 10 MG tablet Take 1/2 tablet nightly for a week and then take full tablet nightly  Ixonia Maizes, APRN - CNP   atorvastatin (LIPITOR) 10 MG tablet Take 1 tablet by mouth daily  Ixonia Maizes, APRN - CNP   amLODIPine (NORVASC) 5 MG tablet TAKE ONE TABLET BY MOUTH DAILY  Giulia Souza, APRN - CNP   Cholecalciferol (VITAMIN D3) 5000 units TABS Take by mouth  Historical Provider, MD   Cyanocobalamin (VITAMIN B-12 PO) Take 1,200 mg by mouth daily  Historical Provider, MD   busPIRone (BUSPAR) 5 MG tablet Take 1 tablet by mouth 3 times daily as needed (anxiety)  Patient not taking: Reported on 11/12/2020  Ixonia Maizes, APRN - CNP   aspirin 81 MG tablet Take 81 mg by mouth daily.   Historical Provider, MD        Allergies   Allergen Reactions    Erythromycin        Past Medical History:   Diagnosis Date    Anxiety     Diabetes ([facility] [address]     GERD (gastroesophageal reflux disease)     Hyperlipidemia     Hypertension     Melanoma ([facility] [address]        Past Surgical History:   Procedure Laterality Date    HYSTERECTOMY, TOTAL ABDOMINAL      Has ovaries    TONSILLECTOMY         Social History     Socioeconomic History    Marital status:      Spouse name: Not on file    Number of children: Not on file    Years of education: Not on file    Highest education level: Not on file   Occupational History    Not on file   Social Needs    Financial resource strain: Not on file  Food insecurity     Worry: Not on file     Inability: Not on file    Transportation needs     Medical: Not on file     Non-medical: Not on file   Tobacco Use    Smoking status: Never Smoker    Smokeless tobacco: Never Used   Substance and Sexual Activity    Alcohol use: Yes    Drug use: No    Sexual activity: Not on file   Lifestyle    Physical activity     Days per week: Not on file     Minutes per session: Not on file    Stress: Not on file   Relationships    Social connections     Talks on phone: Not on file     Gets together: Not on file     Attends [employer] service: Not on file     Active member of club or organization: Not on file     Attends meetings of clubs or organizations: Not on file     Relationship status: Not on file    Intimate partner violence     Fear of current or ex partner: Not on file     Emotionally abused: Not on file     Physically abused: Not on file     Forced sexual activity: Not on file   Other Topics Concern    Not on file   Social History Narrative    Not on file        Family History   Problem Relation Age of Onset    Heart Disease Father     High Blood Pressure Father     Macular Degen Mother     Diabetes Mother     High Cholesterol Mother     High Cholesterol Brother     Diabetes Maternal Grandmother     Heart Disease Maternal Grandfather     Cancer Paternal Grandmother     Heart Disease Paternal Grandfather        Vitals:    01/07/21 0940   BP: 123/78   Pulse: 72   Resp: 18   Temp: 98 °F (36.7 °C)   TempSrc: Temporal   SpO2: 99%     Estimated body mass index is 30.41 kg/m² as calculated from the following:    Height as of 11/27/19: 5' 1.2\" (1.554 m). Weight as of 11/12/20: 162 lb (73.5 kg). Physical Exam  Vitals signs and nursing note reviewed. Constitutional:       Appearance: She is well-developed. She is obese. She is ill-appearing. She is not diaphoretic. HENT:      Head: Normocephalic.       Nose: Nose normal.      Mouth/Throat: Mouth: Mucous membranes are moist.      Pharynx: No oropharyngeal exudate. Comments: Dry lips  Eyes:      General:         Right eye: No discharge. Left eye: No discharge. Conjunctiva/sclera: Conjunctivae normal.      Pupils: Pupils are equal, round, and reactive to light. Neck:      Musculoskeletal: Normal range of motion. Cardiovascular:      Rate and Rhythm: Normal rate and regular rhythm. Heart sounds: Normal heart sounds. No murmur. No friction rub. No gallop. Pulmonary:      Effort: Pulmonary effort is normal. No respiratory distress. Breath sounds: Wheezing present. Comments: Mild expiratory wheeze  Abdominal:      General: Bowel sounds are normal. There is no distension. Palpations: Abdomen is soft. Tenderness: There is no abdominal tenderness. Musculoskeletal: Normal range of motion. Right lower leg: No edema. Left lower leg: No edema. Skin:     General: Skin is warm and dry. Capillary Refill: Capillary refill takes less than 2 seconds. Comments: Mild delay in skin turgor   Neurological:      General: No focal deficit present. Mental Status: She is alert and oriented to person, place, and time. Cranial Nerves: No cranial nerve deficit. Motor: No weakness.       Comments: Normal Neuro examination, no evidence of deficit, drift   Psychiatric:         Behavior: Behavior normal.         ASSESSMENT/PLAN: Afebrile, non-toxic patient in NAD, although not feeling well, looking very tired, dry, run down on my evaluation. +COVID testing 22 days ago. Evaluated ED notes/labs from 14 days ago. Patient is evaluated in my office with appropriate protections. Vitals are WNL. Pt has not resumed taking her hypertension medications. Which is reasonable. Logging her home BP's only one measure would be considered HTN. I advised her to continue to hold until consistently seeing elevated. Encouraged to increase her fluid intake. Tolerating PO. Decreased appetite, still not much taste. Mild expiratory wheeze. No crackles or rales, low suspicion for PNA. Answered all questions. Reassured Patient. Follow up PRN. 1. COVID-19  -Over the next 5-7 days work very hard to increase your fluid intake, take OTC Imodium if diarrhea returns.   -Continue to take your tessalon to help with cough as needed. Let us know if you need a refill. 2. Hypotension, unspecified hypotension type  Over the next 5-7 days work very hard to increase your fluid intake    3. Dehydration  Over the next 5-7 days work very hard to increase your fluid intake    4. Cough  -Continue to take your tessalon to help with cough as needed. Let us know if you need a refill. 5. Postviral fatigue syndrome  -As you increase your fluids and continue to recover from [place] your fatigue should improve. Listen to your body. Do not push yourself too much. Take the time to rest and recover. Allow your  and daughters to help you. 6. Benign essential hypertension  Restart your Norvasc when you have 3 days in a row of BP readings at home where the top number is greater then 130.   -HOLD your Hydrochlorothiazide until you feel like your fatigue has completely resolved, your lower extremities are swelling again and your BP is consistently elevated without low readings (no top number under 110). Return if symptoms worsen or fail to improve. An  electronic signature was used to authenticate this note.          --Sindy Buckley PA-C on 1/7/2021 at 11:54 AM

## 2021-01-07 NOTE — PATIENT INSTRUCTIONS
Follow-up care is a key part of your treatment and safety. Be sure to make and go to all appointments, and call your doctor if you are having problems. It's also a good idea to know your test results and keep a list of the medicines you take. How can you care for yourself at home? · Take frequent sips of a drink such as Gatorade, Powerade, or other rehydration drinks that your doctor suggests. These replace both fluid and important chemicals (electrolytes) you need for balance in your blood. · Drink 2 quarts of cool liquid over 2 to 4 hours. You should have at least 10 glasses of liquid a day to replace lost fluid. If you have kidney, heart, or liver disease and have to limit fluids, talk with your doctor before you increase the amount of fluids you drink. · Make your own drink. Measure everything carefully. The drink may not work well or may even be harmful if the amounts are off. ? 1 quart water  ? ½ teaspoon salt  ? 6 teaspoons sugar  · Do not drink liquid with caffeine, such as coffee and faye. · Do not drink any alcohol. It can make you dehydrated. · Drink plenty of fluids, enough so that your urine is light yellow or clear like water. If you have kidney, heart, or liver disease and have to limit fluids, talk with your doctor before you increase the amount of fluids you drink. When should you call for help? Call 911 anytime you think you may need emergency care. For example, call if:    · You have signs of severe dehydration, such as:  ? You are confused or unable to stay awake.  ? You passed out (lost consciousness). Call your doctor now or seek immediate medical care if:    · You still have signs of dehydration. You have sunken eyes and a dry mouth, and you pass only a little dark urine.     · You are dizzy or lightheaded, or you feel like you may faint.     · You are not able to keep down fluids.    Watch closely for changes in your health, and be sure to contact your doctor if:   · You do not get better as expected. Where can you learn more? Go to https://chpepiceweb.healthMindBites. org and sign in to your [employer] account. Enter I040 in the [employer] box to learn more about \"Oral Rehydration: Care Instructions. \"     If you do not have an account, please click on the \"Sign Up Now\" link. Current as of: June 26, 2019               Content Version: 12.6  © 6838-1976 [employer]. Care instructions adapted under license by [facility] ([facility]). If you have questions about a medical condition or this instruction, always ask your healthcare professional. [address] any warranty or liability for your use of this information.

## 2021-01-14 ENCOUNTER — OFFICE VISIT (OUTPATIENT)
Dept: PRIMARY CARE CLINIC | Age: 51
End: 2021-01-14
Payer: COMMERCIAL

## 2021-01-14 VITALS
TEMPERATURE: 97.8 F | OXYGEN SATURATION: 98 % | DIASTOLIC BLOOD PRESSURE: 82 MMHG | HEART RATE: 90 BPM | SYSTOLIC BLOOD PRESSURE: 120 MMHG

## 2021-01-14 DIAGNOSIS — G93.31 POSTVIRAL FATIGUE SYNDROME: Primary | ICD-10-CM

## 2021-01-14 DIAGNOSIS — I10 BENIGN ESSENTIAL HTN: ICD-10-CM

## 2021-01-14 PROCEDURE — 99212 OFFICE O/P EST SF 10 MIN: CPT | Performed by: PHYSICIAN ASSISTANT

## 2021-01-14 ASSESSMENT — ENCOUNTER SYMPTOMS
NAUSEA: 0
VOMITING: 0
SHORTNESS OF BREATH: 0
DIARRHEA: 0
EYE REDNESS: 0
SINUS PAIN: 0
SINUS PRESSURE: 0
COUGH: 0
SORE THROAT: 0
ABDOMINAL PAIN: 0
EYE DISCHARGE: 0
RHINORRHEA: 0
CONSTIPATION: 0
CHEST TIGHTNESS: 0

## 2021-01-14 NOTE — PATIENT INSTRUCTIONS
Patient Education     Patient Education        DASH Diet: Care Instructions  Your Care Instructions     The DASH diet is an eating plan that can help lower your blood pressure. DASH stands for Dietary Approaches to Stop Hypertension. Hypertension is high blood pressure. The DASH diet focuses on eating foods that are high in calcium, potassium, and magnesium. These nutrients can lower blood pressure. The foods that are highest in these nutrients are fruits, vegetables, low-fat dairy products, nuts, seeds, and legumes. But taking calcium, potassium, and magnesium supplements instead of eating foods that are high in those nutrients does not have the same effect. The DASH diet also includes whole grains, fish, and poultry. The DASH diet is one of several lifestyle changes your doctor may recommend to lower your high blood pressure. Your doctor may also want you to decrease the amount of sodium in your diet. Lowering sodium while following the DASH diet can lower blood pressure even further than just the DASH diet alone. Follow-up care is a key part of your treatment and safety. Be sure to make and go to all appointments, and call your doctor if you are having problems. It's also a good idea to know your test results and keep a list of the medicines you take. How can you care for yourself at home? Following the DASH diet  · Eat 4 to 5 servings of fruit each day. A serving is 1 medium-sized piece of fruit, ½ cup chopped or canned fruit, 1/4 cup dried fruit, or 4 ounces (½ cup) of fruit juice. Choose fruit more often than fruit juice. · Eat 4 to 5 servings of vegetables each day. A serving is 1 cup of lettuce or raw leafy vegetables, ½ cup of chopped or cooked vegetables, or 4 ounces (½ cup) of vegetable juice. Choose vegetables more often than vegetable juice. · Get 2 to 3 servings of low-fat and fat-free dairy each day. A serving is 8 ounces of milk, 1 cup of yogurt, or 1 ½ ounces of cheese. · Eat 6 to 8 servings of grains each day. A serving is 1 slice of bread, 1 ounce of dry cereal, or ½ cup of cooked rice, pasta, or cooked cereal. Try to choose whole-grain products as much as possible. · Limit lean meat, poultry, and fish to 2 servings each day. A serving is 3 ounces, about the size of a deck of cards. · Eat 4 to 5 servings of nuts, seeds, and legumes (cooked dried beans, lentils, and split peas) each week. A serving is 1/3 cup of nuts, 2 tablespoons of seeds, or ½ cup of cooked beans or peas. · Limit fats and oils to 2 to 3 servings each day. A serving is 1 teaspoon of vegetable oil or 2 tablespoons of salad dressing. · Limit sweets and added sugars to 5 servings or less a week. A serving is 1 tablespoon jelly or jam, ½ cup sorbet, or 1 cup of lemonade. · Eat less than 2,300 milligrams (mg) of sodium a day. If you limit your sodium to 1,500 mg a day, you can lower your blood pressure even more. Tips for success  · Start small. Do not try to make dramatic changes to your diet all at once. You might feel that you are missing out on your favorite foods and then be more likely to not follow the plan. Make small changes, and stick with them. Once those changes become habit, add a few more changes. · Try some of the following:  ? Make it a goal to eat a fruit or vegetable at every meal and at snacks. This will make it easy to get the recommended amount of fruits and vegetables each day. ? Try yogurt topped with fruit and nuts for a snack or healthy dessert. ? Add lettuce, tomato, cucumber, and onion to sandwiches. ? Combine a ready-made pizza crust with low-fat mozzarella cheese and lots of vegetable toppings. Try using tomatoes, squash, spinach, broccoli, carrots, cauliflower, and onions. ? Have a variety of cut-up vegetables with a low-fat dip as an appetizer instead of chips and dip. ? Sprinkle sunflower seeds or chopped almonds over salads. Or try adding chopped walnuts or almonds to cooked vegetables. ? Try some vegetarian meals using beans and peas. Add garbanzo or kidney beans to salads. Make burritos and tacos with mashed manzanares beans or black beans. Where can you learn more? Go to https://Lazada Grouppepiceweb.[employer]. org and sign in to your [employer] account. Enter A465 in the [facility] box to learn more about \"DASH Diet: Care Instructions. \"     If you do not have an account, please click on the \"Sign Up Now\" link. Current as of: December 16, 2019               Content Version: 12.6  © 9073-1561 [employer]. Care instructions adapted under license by [facility] ([facility]). If you have questions about a medical condition or this instruction, always ask your healthcare professional. [place] [address] any warranty or liability for your use of this information. Work-Life Balance: Care Instructions  Your Care Instructions    Do you ever feel like there is not enough time to do all of the things you have to do, and no time at all for the things you enjoy? If so, you are not alone. On average, people in the [place] have worked more and more hours each year since 1970. But in recent years, fewer people say they want to take on more at work, even if they would get promoted or get paid more money. More and more workers say they want time to spend with their families and to do things that are important to them. Do you ever feel:  · That you always have more and more work to do at your job? · That too many people depend on you every day? · That you never have enough time for your family or friends? · That you never have time for hobbies or things you enjoy? · That each second of your day is scheduled? If you answered \"yes\" to any of these questions, take steps at work and at home to get your life into balance. Follow-up care is a key part of your treatment and safety. Be sure to make and go to all appointments, and call your doctor if you are having problems. How can you care for yourself at home? Manage your time  · Focus on the important things. Taking on too much can wear you out. Look at how you spend your time, and redirect your focus. Learn to say \"no\" and let go of things that do not matter. · Set one small goal at a time. Use a day planner. Break large projects into smaller ones. · Ask for help. Let your children, your spouse, your coworkers, and other people in your life help you get things done. · Leave your job at the office. If you give up free time to get more work done, you may pay for it with stress. If your job offers a flexible work schedule, use it to fit your own work style. For instance, come in earlier to have a longer lunch break, or make time for a yoga class or workout during your workday. · Unplug. Do not let technology (such as your cell phone or the Internet) erase the line between your time and your employer's time. Lower job stress  Job stress causes trouble at work and at home. At work, you may worry about things you have not had time to do at home. At home, you may worry about your job. This cycle upsets your work-life balance. Lowering your job stress can get your life back in balance. Job stress can be caused by:  · Pressure and deadlines. · Heavy workloads or long hours. · Not being allowed to make decisions. · Health and safety hazards. · Feeling you may lose your job. · Unclear or changing job duties. · Too much responsibility. · Work that is very tiring or boring. Do any of these things bother you? Consider talking with your boss to change things. There are some things that you may not be able to control. But even a few small changes might help lower your stress.   Take advantage of programs at work Businesses make money and are better off in other ways if their employees are healthy and happy. For this reason, many companies have programs to help balance work life and home life. These programs may include:  · Flexible schedules and hours. · Time off for family reasons, education, or community service. · Being able to work from home. · Employee assistance programs to provide counseling. · Child-care programs. Check to see if your company has any of these or other programs that could help you. If not, consider talking to your boss about why work-life balance programs make good business sense. Even if your company does not start an official program, you may be able to get flexible hours, time off, or the ability to do some work from home. Know when to quit  If you are truly unhappy because of a stressful job, and if the suggestions here have not worked, it may be time to think about changing jobs or changing careers. But before you quit, take time to research your options. Where can you learn more? Go to https://ViRTUAL INTERACTiVEpeIslet Scienceseb.[employer]. org and sign in to your [email or web address] account. Enter S563 in the [employer] box to learn more about \"Work-Life Balance: Care Instructions. \"     If you do not have an account, please click on the \"Sign Up Now\" link. Current as of: December 16, 2019               Content Version: 12.6  © 3192-5317 [employer]. Care instructions adapted under license by [facility] ([facility]). If you have questions about a medical condition or this instruction, always ask your healthcare professional. [place] [address] any warranty or liability for your use of this information. High Blood Pressure: Care Instructions  Overview     It's normal for blood pressure to go up and down throughout the day. But if it stays up, you have high blood pressure. Another name for high blood pressure is hypertension. · Stay at a healthy weight. This is especially important if you put on weight around the waist. Losing even 10 pounds can help you lower your blood pressure. · If your doctor recommends it, get more exercise. Walking is a good choice. Bit by bit, increase the amount you walk every day. Try for at least 30 minutes on most days of the week. You also may want to swim, bike, or do other activities. · Avoid or limit alcohol. Talk to your doctor about whether you can drink any alcohol. · Try to limit how much sodium you eat to less than 2,300 milligrams (mg) a day. Your doctor may ask you to try to eat less than 1,500 mg a day. · Eat plenty of fruits (such as bananas and oranges), vegetables, legumes, whole grains, and low-fat dairy products. · Lower the amount of saturated fat in your diet. Saturated fat is found in animal products such as milk, cheese, and meat. Limiting these foods may help you lose weight and also lower your risk for heart disease. · Do not smoke. Smoking increases your risk for heart attack and stroke. If you need help quitting, talk to your doctor about stop-smoking programs and medicines. These can increase your chances of quitting for good. When should you call for help? Call  911 anytime you think you may need emergency care. This may mean having symptoms that suggest that your blood pressure is causing a serious heart or blood vessel problem. Your blood pressure may be over 180/120. For example, call 911 if:    · You have symptoms of a heart attack. These may include:  ? Chest pain or pressure, or a strange feeling in the chest.  ? Sweating. ? Shortness of breath. ? Nausea or vomiting. ? Pain, pressure, or a strange feeling in the back, neck, jaw, or upper belly or in one or both shoulders or arms. ? Lightheadedness or sudden weakness. ? A fast or irregular heartbeat.     · You have symptoms of a stroke.  These may include:

## 2021-01-14 NOTE — PROGRESS NOTES
escitalopram (LEXAPRO) 10 MG tablet Take 1/2 tablet nightly for a week and then take full tablet nightly Yes THAO Bryant CNP   atorvastatin (LIPITOR) 10 MG tablet Take 1 tablet by mouth daily Yes THAO Bryant CNP   Cholecalciferol (VITAMIN D3) 5000 units TABS Take by mouth Yes Historical Provider, MD   Cyanocobalamin (VITAMIN B-12 PO) Take 1,200 mg by mouth daily Yes Historical Provider, MD   busPIRone (BUSPAR) 5 MG tablet Take 1 tablet by mouth 3 times daily as needed (anxiety) Yes THAO Bryant CNP   aspirin 81 MG tablet Take 81 mg by mouth daily. Yes Historical Provider, MD   hydroCHLOROthiazide (HYDRODIURIL) 25 MG tablet Take 1 tablet by mouth daily  Patient not taking: Reported on 1/14/2021  THAO Pulido CNP   amLODIPine (NORVASC) 5 MG tablet TAKE ONE TABLET BY MOUTH DAILY  Patient not taking: Reported on 1/14/2021  [place]  THAO Souza CNP        Social History     Tobacco Use    Smoking status: Never Smoker    Smokeless tobacco: Never Used   Substance Use Topics    Alcohol use: Yes        Vitals:    01/14/21 1133   BP: 120/82   Pulse: 90   Temp: 97.8 °F (36.6 °C)   TempSrc: Temporal   SpO2: 98%     Estimated body mass index is 30.41 kg/m² as calculated from the following:    Height as of 11/27/19: 5' 1.2\" (1.554 m). Weight as of 11/12/20: 162 lb (73.5 kg). Physical Exam  Vitals signs and nursing note reviewed. Constitutional:       Appearance: She is well-developed. She is obese. She is not diaphoretic. HENT:      Head: Normocephalic. Nose: Nose normal.      Mouth/Throat:      Pharynx: No oropharyngeal exudate. Eyes:      General:         Right eye: No discharge. Left eye: No discharge. Conjunctiva/sclera: Conjunctivae normal.      Pupils: Pupils are equal, round, and reactive to light. Neck:      Musculoskeletal: Normal range of motion. Cardiovascular:      Rate and Rhythm: Normal rate and regular rhythm. Heart sounds: Normal heart sounds. No murmur. No friction rub. No gallop. Pulmonary:      Effort: Pulmonary effort is normal. No respiratory distress. Breath sounds: Normal breath sounds. No wheezing. Abdominal:      General: Bowel sounds are normal. There is no distension. Palpations: Abdomen is soft. Tenderness: There is no abdominal tenderness. Musculoskeletal: Normal range of motion. Right lower leg: No edema. Left lower leg: No edema. Skin:     General: Skin is warm and dry. Capillary Refill: Capillary refill takes less than 2 seconds. Neurological:      General: No focal deficit present. Mental Status: She is alert. Psychiatric:         Behavior: Behavior normal.         ASSESSMENT/PLAN: non toxic, patient NAD, BP perfect today at 120/82. No evidence of peripheral edema. No wheezes, no crackles no rales. Advised she is drinking more water then ever, after having to go to the hospital for dehydration, annoyed with peeing all the time, however feeling better, no leg swelling, feeling good. slowily feeling up to more activity. I advise her to continue to check her BPs and log at home, no need to re-start BP meds as of now. But we will continue to monitor closely. Discussed loss of her dog, she had for 12 years. Discussed her daughters and their plans as finish HS/college. Discussed stress management. Increasing activity. Stop in so we can re-check her BP as needed. 3 or more days elevated and then we will re-start norvasc. 1. Postviral fatigue syndrome  Continue gradual increase in activity, continue fluids. 2. Benign essential HTN  Hold Norvasc and HCTZ      Return if symptoms worsen or fail to improve. An electronic signature was used to authenticate this note.           --Sindy Buckley PA-C on 1/14/2021 at 3:00 PM

## 2021-02-05 RX ORDER — AMLODIPINE BESYLATE 5 MG/1
TABLET ORAL
Qty: 90 TABLET | Refills: 0 | Status: SHIPPED | OUTPATIENT
Start: 2021-02-05 | End: 2021-05-06

## 2021-03-03 ENCOUNTER — TELEPHONE (OUTPATIENT)
Dept: PRIMARY CARE CLINIC | Age: 51
End: 2021-03-03

## 2021-03-03 NOTE — TELEPHONE ENCOUNTER
Please advise with recent COVID infection, could be possibility of post viral cardiomyopathy, causing symptoms. We can send her to see Dr. Haydee Nixon (cardiology) if she would like? He may recommend an echo or holter monitor to evaluate her symptoms?

## 2021-03-03 NOTE — TELEPHONE ENCOUNTER
Patient still having concerns on her blood pressure. She is supposed to bring in her blood pressure cuff so we can compare it to ours. She states that she can some times feel her heart beet at night when she is just sitting. Please advise.

## 2021-03-04 ENCOUNTER — OFFICE VISIT (OUTPATIENT)
Dept: PRIMARY CARE CLINIC | Age: 51
End: 2021-03-04
Payer: COMMERCIAL

## 2021-03-04 VITALS
WEIGHT: 171 LBS | OXYGEN SATURATION: 98 % | BODY MASS INDEX: 32.1 KG/M2 | HEART RATE: 84 BPM | TEMPERATURE: 97.2 F | SYSTOLIC BLOOD PRESSURE: 124 MMHG | DIASTOLIC BLOOD PRESSURE: 80 MMHG

## 2021-03-04 DIAGNOSIS — G93.31 POSTVIRAL FATIGUE SYNDROME: ICD-10-CM

## 2021-03-04 DIAGNOSIS — I10 BENIGN ESSENTIAL HTN: ICD-10-CM

## 2021-03-04 DIAGNOSIS — K13.70 LESION OF MOUTH: Primary | ICD-10-CM

## 2021-03-04 DIAGNOSIS — R00.2 PALPITATIONS: ICD-10-CM

## 2021-03-04 DIAGNOSIS — F32.A DEPRESSION, UNSPECIFIED DEPRESSION TYPE: ICD-10-CM

## 2021-03-04 PROCEDURE — 99214 OFFICE O/P EST MOD 30 MIN: CPT | Performed by: PHYSICIAN ASSISTANT

## 2021-03-04 RX ORDER — VALACYCLOVIR HYDROCHLORIDE 500 MG/1
500 TABLET, FILM COATED ORAL SEE ADMIN INSTRUCTIONS
COMMUNITY
Start: 2021-02-04

## 2021-03-04 RX ORDER — BUPROPION HYDROCHLORIDE 100 MG/1
100 TABLET, EXTENDED RELEASE ORAL 2 TIMES DAILY
Qty: 60 TABLET | Refills: 0 | Status: SHIPPED | OUTPATIENT
Start: 2021-03-04 | End: 2021-09-21 | Stop reason: SDUPTHER

## 2021-03-04 RX ORDER — ASPIRIN 81 MG/1
81 TABLET ORAL DAILY
COMMUNITY

## 2021-03-04 ASSESSMENT — ENCOUNTER SYMPTOMS
CHEST TIGHTNESS: 0
VOMITING: 0
EYE DISCHARGE: 0
DIARRHEA: 0
CONSTIPATION: 0
EYE REDNESS: 0
SINUS PRESSURE: 0
RHINORRHEA: 0
SORE THROAT: 0
NAUSEA: 0
SINUS PAIN: 0
SHORTNESS OF BREATH: 0
ABDOMINAL PAIN: 0
COUGH: 0

## 2021-03-04 NOTE — PROGRESS NOTES
3/4/2021     Myles Martinez (:  1970) is a 45 y.o. female, here for evaluation of the following medical concerns:    Chief Complaint   Patient presents with    Hypertension    Oral Swelling     lump inside right side of mouth        HPI patient presents for evaluation of a 2-day history of a lesion on the right inside of her mouth firm painful 1/10 throbbing, with deep palpation. Feels like she has noticed it in the past and that it has resolved on its own. Patient has a history of melanoma uncertain if she should be worried about this. No drainage from it, no trauma. Patient also would like to discuss palpitations which she still feels anytime she is resting, worsened since she had Covid she feels them at nighttime before she is going to sleep and also when she is resting and crocheting in the evenings. Patient feels like her heart is beating very heavy in her chest.  Pt saw Dr Herson Casey last year for palpitations, had echo and holter moniter, they returned [place]. Pt tried to contact his office after she had covid, when she was having concerns surrounding her BP  Patient had Covid in the end of December. Still feels tired and fatigued, denies having any more coughing or SOB. Patient has been struggling with depression and lack of motivation for more than a year. Advised realistically it has been ongoing for a long time. Was prescribed BuSpar which she took and then began to develop side effects, felt very spaced out on this medication nauseous and was afraid to drive, so she discontinued it, then was prescribed Lexapro which she picked up however never actually started. Review of Systems   Constitutional: Negative for chills and fever. HENT: Positive for mouth sores. Negative for congestion, rhinorrhea, sinus pressure, sinus pain and sore throat.         +mouth lesion   Eyes: Negative for discharge, redness and visual disturbance. Respiratory: Negative for cough, chest tightness and shortness of breath. Cardiovascular: Positive for palpitations. Negative for chest pain. 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 No recent episodes, no recent LE swelling   Gastrointestinal: Negative for abdominal pain, constipation, diarrhea, nausea and vomiting. Genitourinary: Negative for difficulty urinating, dysuria and frequency. Musculoskeletal: Negative. Skin: Negative. Neurological: Negative. Negative for dizziness, weakness, numbness and headaches. Psychiatric/Behavioral: Negative. +depression, lack of motivation   All other systems reviewed and are negative. Prior to Visit Medications    Medication Sig Taking?  Authorizing Provider   valACYclovir (VALTREX) 500 MG tablet Take 500 mg by mouth See Admin Instructions Yes Historical Provider, MD   buPROPion (WELLBUTRIN SR) 100 MG extended release tablet Take 1 tablet by mouth 2 times daily Yes Sindy Buckley PA-C   amLODIPine (NORVASC) 5 MG tablet TAKE ONE TABLET BY MOUTH DAILY Yes Sindy Buckley PA-C   esomeprazole (NEXIUM) 20 MG delayed release capsule Take 1 capsule by mouth daily TAKE ONE CAPSULE BY MOUTH DAILY Yes THAO Solis CNP   ondansetron (ZOFRAN) 4 MG tablet Take 1 tablet by mouth 3 times daily as needed for Nausea or Vomiting Yes THAO Caputo CNP   atorvastatin (LIPITOR) 10 MG tablet Take 1 tablet by mouth daily Yes THAO Solis CNP   Cholecalciferol (VITAMIN D3) 5000 units TABS Take by mouth Yes Historical Provider, MD   Cyanocobalamin (VITAMIN B-12 PO) Take 1,200 mg by mouth daily Yes Historical Provider, MD   aspirin 81 MG EC tablet Take 81 mg by mouth daily  Historical Provider, MD   hydroCHLOROthiazide (HYDRODIURIL) 25 MG tablet Take 1 tablet by mouth daily Patient not taking: Reported on 1/14/2021  Luis Alberto Ruvalcaba APRN - CNP        Social History     Tobacco Use    Smoking status: Never Smoker    Smokeless tobacco: Never Used   Substance Use Topics    Alcohol use: Yes        Vitals:    03/04/21 1416   BP: 124/80   Pulse: 84   Temp: 97.2 °F (36.2 °C)   TempSrc: Temporal   SpO2: 98%   Weight: 171 lb (77.6 kg)     Estimated body mass index is 32.1 kg/m² as calculated from the following:    Height as of 11/27/19: 5' 1.2\" (1.554 m). Weight as of this encounter: 171 lb (77.6 kg). Physical Exam  Vitals signs and nursing note reviewed. Constitutional:       Appearance: She is well-developed. She is obese. She is not diaphoretic. HENT:      Head: Normocephalic. Nose: Nose normal.      Mouth/Throat:      Lips: No lesions. Mouth: Mucous membranes are moist. Oral lesions present. No injury, lacerations or angioedema. Dentition: Normal dentition. No dental tenderness, dental caries or gum lesions. Tongue: No lesions. Tongue does not deviate from midline. Palate: No mass and lesions. Pharynx: Oropharynx is clear. Uvula midline. No oropharyngeal exudate. Tonsils: No tonsillar exudate. Eyes:      General:         Right eye: No discharge. Left eye: No discharge. Conjunctiva/sclera: Conjunctivae normal.      Pupils: Pupils are equal, round, and reactive to light. Neck:      Musculoskeletal: Normal range of motion. Cardiovascular:      Rate and Rhythm: Normal rate and regular rhythm. Heart sounds: Normal heart sounds. No murmur. No friction rub. No gallop. Comments: Patient advised she can feel heart palpitations during my exam.  She has a Regular rate of 86 BPM on my evaluation,  no appreciable heart murmur. Pulmonary:      Effort: Pulmonary effort is normal. No respiratory distress. Breath sounds: Normal breath sounds. No wheezing.    Abdominal: General: Bowel sounds are normal. There is no distension. Palpations: Abdomen is soft. Tenderness: There is no abdominal tenderness. Musculoskeletal: Normal range of motion. General: No swelling. Right lower leg: No edema. Left lower leg: No edema. Comments: No appreciable pedal edema. Patient advised that she typically has lower extremity swelling worse on the left than on the right. Skin:     General: Skin is warm and dry. Capillary Refill: Capillary refill takes less than 2 seconds. Neurological:      General: No focal deficit present. Mental Status: She is alert and oriented to person, place, and time. Mental status is at baseline. Psychiatric:         Attention and Perception: Attention and perception normal.         Mood and Affect: Mood and affect normal.         Speech: Speech normal.         Behavior: Behavior normal.         Thought Content: Thought content normal.         Cognition and Memory: Cognition and memory normal.         Judgment: Judgment normal.      Comments: +struggling with depression, feeling overwhelmed, no motivation, feels overwhelmed by tasks. Not exercising,  no trauma. Not impacting abilities to perform daily tasks of living.   +No SI/SP/HI/HP. ASSESSMENT/PLAN: Evaluated notes from Dr. Silva Ayala, cardiologist who saw her last year for palpitations, evaluated holter and echo findigns which are WNL. Evaluated labs performed at Dec visit to ED, non contributory. Last A1C in nov 2021, 6.4, TSH WNL also in November. Vit D last checked in June of 2020, which was adequate with replacement, also was summer. Will recheck labs today as patient is still feeling fatigue after COVID. Very rundown and lack of motivation. Afebrile, non toxic. HTN under good control. Unable to determine if palpitations are different from before covid, discussed cardiomyopathy seen in some patients after covid, more likely to present with SOB, CP, but could present with palpitations. Offered cardiology, referral, pt preference to establish with different provider. I recommended dr Collette Deed, will place referral.   She has not started her lexapro, did not do well on busbar. Will try wellbutrin. Discussed gradual starting medication and close, frequent check in on mood. Lesion to right inside of mouth, firm, not exquisitely tender, on exam. Came on quickly. Suspect parotid gland stone, however with patients history, and it not being tender, feel evaluation by ENT is most appropriate. Discussed with patient who is in agreement. Will see her back for follow up in 2-3 weeks to check in on mood/fatigue. 1. Benign essential HTN  - Misty Reece MD, Cardiology, [facility]  - CBC WITH AUTO DIFFERENTIAL  - COMPREHENSIVE METABOLIC PANEL    2. Postviral fatigue syndrome  CBC WITH AUTO DIFFERENTIAL  - COMPREHENSIVE METABOLIC PANEL  - TSH with Reflex  - Vitamin B12 & Folate  - Vitamin D 25 Hydroxy  - HEMOGLOBIN A1C  - Lipid Panel      3. Diane Coyne MD, Cardiology, [facility]  [place]  - CBC WITH AUTO DIFFERENTIAL  - COMPREHENSIVE METABOLIC PANEL  - TSH with Reflex  - Vitamin B12 & Folate  - Vitamin D 25 Hydroxy  - HEMOGLOBIN A1C  - Lipid Panel 4. Depression, unspecified depression type  - buPROPion (WELLBUTRIN SR) 100 MG extended release tablet; Take 1 tablet by mouth 2 times daily  Dispense: 60 tablet; Refill: 0  - CBC WITH AUTO DIFFERENTIAL  - COMPREHENSIVE METABOLIC PANEL  - Vitamin B12 & Folate  - Vitamin D 25 Hydroxy  - HEMOGLOBIN A1C  - Lipid Panel    5. Lesion of mouth  - Kacy Knowles MD, Otolaryngology, [facility]      Return in about 1 month (around 4/4/2021), or if symptoms worsen or fail to improve. An electronic signature was used to authenticate this note.           --Sindy Buckley PA-C on 3/5/2021 at 10:49 AM

## 2021-03-04 NOTE — PATIENT INSTRUCTIONS
-Please make an appt to follow up with Dr. Eyal Horne for your heart.  or Dr. Yumiko Hartman ENT, for your mouth.   -Please stop in when you are fasting so we can draw your blood and check your thyroid, electrolytes, B and D vitamin levels. - Wellbutrin and plan to start taking over the weekend. Okay to start with one dose per day for the first 5 days, then increase to two per day. Take first dose on waking then second at lunch time.     -Plan to make a little goal, try and put on your walking shoes, after work in the evenings. See if this helps to motivate you to get out for a little walk as the weather begins to warm up. Exercise will help with your mood, BP and help prevent your legs from swelling.     -Make an appt to follow up with me in 3 weeks to a month to follow up       [facility]: Care Instructions  Your Care Instructions     Salivary glands make saliva, or spit. A salivary gland stone is a piece of hard material, usually calcium, that can form in any of the three main salivary glands in the mouth. Salivary gland stones are also called salivary duct stones. Stones form most often in the gland that releases saliva below the tongue. A stone can block saliva from flowing out of the gland. When saliva backs up behind the stone, it can make the gland swell. The gland swells while you are eating, and then the swelling goes down slowly afterward. The swelling and pain may be under the jaw or in the area in front of the ear, depending on which gland is affected. Your doctor will ask you about your symptoms. He or she may be able to see and feel the gland under your skin or in the floor of your mouth. You may get an imaging test, such as a CT scan or ultrasound. This will help your doctor know if you have a stone and not some other problem. Most stones come out into the mouth on their own. While the stone is in the gland, your doctor may have you take medicine for pain. There are also some things you can do at home to help move the stone. If the stone in your gland hasn't come out within a few weeks, your doctor will discuss treatment options with you. Follow-up care is a key part of your treatment and safety. Be sure to make and go to all appointments, and call your doctor if you are having problems. It's also a good idea to know your test results and keep a list of the medicines you take. How can you care for yourself at home? · Be safe with medicines. Read and follow all instructions on the label. ? If the doctor gave you a prescription medicine for pain, take it as prescribed. ? If you are not taking a prescription pain medicine, ask your doctor if you can take an over-the-counter medicine. · If your doctor prescribed antibiotics, take them as directed. Do not stop taking them just because you feel better. You need to take the full course of antibiotics. · Use sugar-free gum or candies such as lemon drops, or suck on a lemon wedge. They increase saliva, which may help push the stone out. · Gently massage the affected gland to help move the stone. When should you call for help? Call your doctor now or seek immediate medical care if:    · You have symptoms of infection, such as:  ? Increased pain, swelling, warmth, or redness. ? Red streaks leading from the salivary gland. ? Pus draining from the area where the saliva comes out into the mouth. ? A fever. Watch closely for changes in your health, and be sure to contact your doctor if:    · You do not get better as expected. Where can you learn more? Go to https://chkiriteb.[employer]. org and sign in to your [employer] account. Enter Y379 in the [employer] box to learn more about \"Salivary Gland Stone: Care Instructions. \" · Keep a record of your palpitations and bring it to your next doctor's appointment. Write down:  ? The date and time. ? Your pulse. (If your heart is beating fast, it may be hard to count your pulse.)  ? What you were doing when the palpitations started. ? How long the palpitations lasted. ? Any other symptoms. · If an activity causes palpitations, slow down or stop. Talk to your doctor before you do that activity again. · Take your medicines exactly as prescribed. Call your doctor if you think you are having a problem with your medicine. When should you call for help? Call 911 anytime you think you may need emergency care. For example, call if:    · You passed out (lost consciousness).     · You have symptoms of a heart attack. These may include:  ? Chest pain or pressure, or a strange feeling in the chest.  ? Sweating. ? Shortness of breath. ? Pain, pressure, or a strange feeling in the back, neck, jaw, or upper belly or in one or both shoulders or arms. ? Lightheadedness or sudden weakness. ? A fast or irregular heartbeat. After you call 911, the  may tell you to chew 1 adult-strength or 2 to 4 low-dose aspirin. Wait for an ambulance. Do not try to drive yourself.     · You have symptoms of a stroke. These may include:  ? Sudden numbness, tingling, weakness, or loss of movement in your face, arm, or leg, especially on only one side of your body. ? Sudden vision changes. ? Sudden trouble speaking. ? Sudden confusion or trouble understanding simple statements. ? Sudden problems with walking or balance. ? A sudden, severe headache that is different from past headaches. Call your doctor now or seek immediate medical care if:    · You have heart palpitations and:  ? Are dizzy or lightheaded, or you feel like you may faint. ? Have new or increased shortness of breath.    Watch closely for changes in your health, and be sure to contact your doctor if:   · You continue to have heart palpitations. Where can you learn more? Go to https://chpepiceweb.[email or web address] org and sign in to your [employer] account. Enter R508 in the [facility] box to learn more about \"Palpitations: Care Instructions. \"     If you do not have an account, please click on the \"Sign Up Now\" link. Current as of: December 16, 2019               Content Version: 12.6  © 5230-6516 [employer]. Care instructions adapted under license by [facility] [place] ([facility]). If you have questions about a medical condition or this instruction, always ask your healthcare professional. [address] any warranty or liability for your use of this information. Patient Education        bupropion  Pronunciation:  byoo PRO pee on  Brand:  Aplenzin, Forfivo XL, Wellbutrin SR, Wellbutrin XL, Zyban Advantage Pack  What is the most important information I should know about bupropion? You should not take bupropion if you have seizures or an eating disorder, or if you have suddenly stopped using alcohol, seizure medication, or sedatives. If you take Wellbutrin for depression, do not also take Zyban to quit smoking. Do not use bupropion within 14 days before or 14 days after you have used an MAO inhibitor, such as isocarboxazid, linezolid, methylene blue injection, phenelzine, rasagiline, selegiline, or tranylcypromine. Some young people have thoughts about suicide when first taking an antidepressant. Stay alert to changes in your mood or symptoms. Report any new or worsening symptoms to your doctor. What is bupropion? Bupropion is an antidepressant used to treat major depressive disorder and seasonal affective disorder. The Zyban brand of bupropion is used to help people stop smoking by reducing cravings and other withdrawal effects. Bupropion may also be used for purposes not listed in this medication guide. What should I discuss with my healthcare provider before taking bupropion? You should not take bupropion if you are allergic to it, or if you have:  · a seizure disorder;  · an eating disorder such as anorexia or bulimia; or  · if you have suddenly stopped using alcohol, seizure medication, or a sedative (such as Xanax, Valium, Fiorinal, Klonopin, and others). Do not use an MAO inhibitor within 14 days before or 14 days after you take bupropion. A dangerous drug interaction could occur. MAO inhibitors include isocarboxazid, linezolid, phenelzine, rasagiline, selegiline, and tranylcypromine. Do not take bupropion to treat more than one condition at a time. If you take bupropion for depression, do not also take this medicine to quit smoking. Bupropion may cause seizures, especially if you have certain medical conditions or use certain drugs. Tell your doctor about all of your medical conditions and the drugs you use. Tell your doctor if you have ever had:  · a head injury, seizures, or brain or spinal cord tumor;  · narrow-angle glaucoma;  · heart disease, high blood pressure, or a heart attack;  · diabetes;  · kidney or liver disease (especially cirrhosis);  · depression, bipolar disorder, or other mental illness; or  · if you drink alcohol. Some young people have thoughts about suicide when first taking an antidepressant. Your doctor will need to check your progress at regular visits. Your family or other caregivers should also be alert to changes in your mood or symptoms. Ask your doctor about taking this medicine if you are pregnant. It is not known whether bupropion will harm an unborn baby. However, you may have a relapse of depression if you stop taking your antidepressant. Tell your doctor right away if you become pregnant. Do not start or stop taking bupropion without your doctor's advice. If you are pregnant, your name may be listed on a pregnancy registry to track the effects of bupropion on the baby. It may not be safe to breastfeed while using this medicine. Ask your doctor about any risk. Bupropion is not approved for use by anyone younger than 25years old. How should I take bupropion? Follow all directions on your prescription label and read all medication guides or instruction sheets. Your doctor may occasionally change your dose. Use the medicine exactly as directed. Too much of this medicine can increase your risk of a seizure. You may take bupropion with or without food. Swallow the extended-release tablet whole and do not crush, chew, or break it. You should not change your dose or stop using bupropion suddenly, unless you have a seizure while taking this medicine. Stopping suddenly can cause unpleasant withdrawal symptoms. Ask your doctor how to safely stop using bupropion. If you take Zyban to help you stop smoking, you may continue to smoke for about 1 week after you start the medicine. Set a date to quit smoking during the first 2 weeks of treatment. Talk to your doctor if you have trouble quitting after taking Zyban for 7 to 12 weeks. Your doctor may prescribe a nicotine replacement product (such as patches or gum) to help you stop smoking. Start using the nicotine replacement product on the same day you stop (quit) smoking or using tobacco products. Some people taking bupropion (Wellbutrin or Zyban) have had high blood pressure that is severe, especially when also using a nicotine replacement product (patch or gum). Your blood pressure may need to be checked before and during treatment with bupropion. Read and carefully follow any Instructions for Use provided with your medicine. Ask your doctor or pharmacist if you do not understand these instructions. You may have nicotine withdrawal symptoms when you stop smoking, including: increased appetite, weight gain, trouble sleeping, trouble concentrating, slower heart rate, having the urge to smoke, and feeling anxious, restless, depressed, angry, frustrated, or irritated. These symptoms may occur with or without using medication such as Zyban. Smoking cessation may also cause new or worsening mental health problems, such as depression. This medicine may affect a drug-screening urine test and you may have false results. Tell the laboratory staff that you use bupropion. Store at room temperature away from moisture, heat, and light. What happens if I miss a dose? Skip the missed dose and use your next dose at the regular time. Do not use two doses at one time. What happens if I overdose? Seek emergency medical attention or call the Poison Help line at 1-883.752.6628. An overdose of bupropion can be fatal.  Overdose symptoms may include muscle stiffness, hallucinations, fast or uneven heartbeat, shallow breathing, or fainting. What should I avoid while taking bupropion? Drinking alcohol with bupropion may increase your risk of seizures. If you drink alcohol regularly, talk with your doctor before changing the amount you drink. Bupropion can also cause seizures in a regular drinker who suddenly stops drinking at the start of treatment with bupropion. Avoid driving or hazardous activity until you know how this medicine will affect you. Your reactions could be impaired. What are the possible side effects of bupropion? Get emergency medical help if you have signs of an allergic reaction (hives, itching, fever, swollen glands, difficult breathing, swelling in your face or throat) or a severe skin reaction (fever, sore throat, burning eyes, skin pain, red or purple skin rash with blistering and peeling). Report any new or worsening symptoms to your doctor, such as: mood or behavior changes, anxiety, depression, panic attacks, trouble sleeping, or if you feel impulsive, irritable, agitated, hostile, aggressive, restless, hyperactive (mentally or physically), more depressed, or have thoughts about suicide or hurting yourself. Call your doctor at once if you have:  · a seizure (convulsions);  · confusion, unusual changes in mood or behavior;  · blurred vision, tunnel vision, eye pain or swelling, or seeing halos around lights;  · fast or irregular heartbeats; or  · a manic episode --racing thoughts, increased energy, reckless behavior, feeling extremely happy or irritable, talking more than usual, severe problems with sleep. Common side effects may include:  · dry mouth, sore throat, stuffy nose;  · ringing in the ears;  · blurred vision;  · nausea, vomiting, stomach pain, loss of appetite, constipation;  · sleep problems (insomnia);  · tremors, sweating, feeling anxious or nervous;  · fast heartbeats;  · confusion, agitation, hostility;  · rash;  · weight loss;  · increased urination;  · headache, dizziness; or  · muscle or joint pain. This is not a complete list of side effects and others may occur. Call your doctor for medical advice about side effects. You may report side effects to FDA at 1-240-FDA-4135. What other drugs will affect bupropion? You may have a higher risk of seizures if you use certain other medicines while taking bupropion. Many drugs can affect bupropion. This includes prescription and over-the-counter medicines, vitamins, and herbal products. Not all possible interactions are listed here. Tell your doctor about all your current medicines and any medicine you start or stop using. Where can I get more information? Your pharmacist can provide more information about bupropion. Remember, keep this and all other medicines out of the reach of children, never share your medicines with others, and use this medication only for the indication prescribed. Every effort has been made to ensure that the information provided by [address] is accurate, up-to-date, and complete, but no guarantee is made to that effect. Drug information contained herein may be time sensitive. [facility] information has been compiled for use by healthcare practitioners and consumers in the [place] and therefore [facility] does not warrant that uses outside of the [place] are appropriate, unless specifically indicated otherwise. [facility]'s drug information does not endorse drugs, diagnose patients or recommend therapy. [facility]'s drug information is an informational resource designed to assist licensed healthcare practitioners in caring for their patients and/or to serve consumers viewing this service as a supplement to, and not a substitute for, the expertise, skill, knowledge and judgment of healthcare practitioners. The absence of a warning for a given drug or drug combination in no way should be construed to indicate that the drug or drug combination is safe, effective or appropriate for any given patient. [facility] does not assume any responsibility for any aspect of healthcare administered with the aid of information [facility] provides. The information contained herein is not intended to cover all possible uses, directions, precautions, warnings, drug interactions, allergic reactions, or adverse effects. If you have questions about the drugs you are taking, check with your doctor, nurse or pharmacist.  Copyright 6599-8633 Bettina Butterfield: 24.01. Revision date: 1/27/2020. Care instructions adapted under license by [facility] [place] ([facility]). If you have questions about a medical condition or this instruction, always ask your healthcare professional. [address] any warranty or liability for your use of this information.

## 2021-03-17 ENCOUNTER — NURSE ONLY (OUTPATIENT)
Dept: PRIMARY CARE CLINIC | Age: 51
End: 2021-03-17

## 2021-03-17 NOTE — PROGRESS NOTES
Patient came into the office per physician's request for the following blood test(s): lipid, a1c, Vit D, and Vit B12, TSH, CMP, CBC.       Blood drawn in office by Velvet Running     # of tubes sent: 2 SST, 1 LAV

## 2021-03-18 LAB
A/G RATIO: 1.4 (ref 1.1–2.2)
ALBUMIN SERPL-MCNC: 4.3 G/DL (ref 3.4–5)
ALP BLD-CCNC: 66 U/L (ref 40–129)
ALT SERPL-CCNC: 24 U/L (ref 10–40)
ANION GAP SERPL CALCULATED.3IONS-SCNC: 11 MMOL/L (ref 3–16)
AST SERPL-CCNC: 18 U/L (ref 15–37)
BASOPHILS ABSOLUTE: 0.1 K/UL (ref 0–0.2)
BASOPHILS RELATIVE PERCENT: 1 %
BILIRUB SERPL-MCNC: 0.4 MG/DL (ref 0–1)
BUN BLDV-MCNC: 10 MG/DL (ref 7–20)
CALCIUM SERPL-MCNC: 9.2 MG/DL (ref 8.3–10.6)
CHLORIDE BLD-SCNC: 103 MMOL/L (ref 99–110)
CHOLESTEROL, TOTAL: 183 MG/DL (ref 0–199)
CO2: 24 MMOL/L (ref 21–32)
CREAT SERPL-MCNC: 0.7 MG/DL (ref 0.6–1.1)
EOSINOPHILS ABSOLUTE: 0.3 K/UL (ref 0–0.6)
EOSINOPHILS RELATIVE PERCENT: 3.8 %
ESTIMATED AVERAGE GLUCOSE: 122.6 MG/DL
FOLATE: 17.48 NG/ML (ref 4.78–24.2)
GFR AFRICAN AMERICAN: >60
GFR NON-AFRICAN AMERICAN: >60
GLOBULIN: 3 G/DL
GLUCOSE BLD-MCNC: 99 MG/DL (ref 70–99)
HBA1C MFR BLD: 5.9 %
HCT VFR BLD CALC: 47.8 % (ref 36–48)
HDLC SERPL-MCNC: 54 MG/DL (ref 40–60)
HEMOGLOBIN: 15.5 G/DL (ref 12–16)
LDL CHOLESTEROL CALCULATED: 101 MG/DL
LYMPHOCYTES ABSOLUTE: 2.4 K/UL (ref 1–5.1)
LYMPHOCYTES RELATIVE PERCENT: 30.9 %
MCH RBC QN AUTO: 28 PG (ref 26–34)
MCHC RBC AUTO-ENTMCNC: 32.3 G/DL (ref 31–36)
MCV RBC AUTO: 86.6 FL (ref 80–100)
MONOCYTES ABSOLUTE: 0.7 K/UL (ref 0–1.3)
MONOCYTES RELATIVE PERCENT: 8.5 %
NEUTROPHILS ABSOLUTE: 4.3 K/UL (ref 1.7–7.7)
NEUTROPHILS RELATIVE PERCENT: 55.8 %
PDW BLD-RTO: 14.1 % (ref 12.4–15.4)
PLATELET # BLD: 338 K/UL (ref 135–450)
PMV BLD AUTO: 9.6 FL (ref 5–10.5)
POTASSIUM SERPL-SCNC: 4.3 MMOL/L (ref 3.5–5.1)
RBC # BLD: 5.53 M/UL (ref 4–5.2)
SODIUM BLD-SCNC: 138 MMOL/L (ref 136–145)
TOTAL PROTEIN: 7.3 G/DL (ref 6.4–8.2)
TRIGL SERPL-MCNC: 140 MG/DL (ref 0–150)
TSH REFLEX: 1.95 UIU/ML (ref 0.27–4.2)
VITAMIN B-12: 617 PG/ML (ref 211–911)
VITAMIN D 25-HYDROXY: 50.7 NG/ML
VLDLC SERPL CALC-MCNC: 28 MG/DL
WBC # BLD: 7.8 K/UL (ref 4–11)

## 2021-04-02 ENCOUNTER — OFFICE VISIT (OUTPATIENT)
Dept: CARDIOLOGY CLINIC | Age: 51
End: 2021-04-02
Payer: COMMERCIAL

## 2021-04-02 VITALS
WEIGHT: 172 LBS | BODY MASS INDEX: 32.47 KG/M2 | SYSTOLIC BLOOD PRESSURE: 128 MMHG | HEIGHT: 61 IN | HEART RATE: 85 BPM | OXYGEN SATURATION: 99 % | TEMPERATURE: 97.2 F | DIASTOLIC BLOOD PRESSURE: 60 MMHG

## 2021-04-02 DIAGNOSIS — E78.2 MIXED HYPERLIPIDEMIA: ICD-10-CM

## 2021-04-02 DIAGNOSIS — R00.2 PALPITATIONS: Primary | ICD-10-CM

## 2021-04-02 DIAGNOSIS — I10 BENIGN ESSENTIAL HTN: ICD-10-CM

## 2021-04-02 PROCEDURE — 99214 OFFICE O/P EST MOD 30 MIN: CPT | Performed by: INTERNAL MEDICINE

## 2021-04-02 NOTE — PATIENT INSTRUCTIONS
PLAN:  1. Recommend taking baby Asprin 81 mg   2. Recommend taking metoprolol 25 mg twice a day to help with palpitations. ~call if you have any issues with taking this medication   3. Recommend that your try to exercise daily  - walking daily - eating healthy   4.  Follow up in 3 months

## 2021-04-02 NOTE — PROGRESS NOTES
[facility], [place], operated by [facility] Office Note  4/2/2021     Subjective:  Ms. Agnieszka Medina is being seen today for c/o irregular heart beat. HPI: Booker Taylor 46 y/o presents for c/o of irregular heart beat. Previously seen by my partner, Dr. Angy Jasso in 6/2020 for similar concerns. Cardiac monitor worn 6/12/20-6/26/20 showed <1% PAC's and PVC's, brief arrhythmia. Echo 8/11/20 demonstrated EF 55%, normal.  Today she reports she had Covid in December 2020. States it took her about 5 weeks to get over it. She states during that time she got dehydrated and her BP was low. She states she saw an NP during that time. She states she can feel her heart pounding. She states is can be with or without exertion. She does not get SOB, dizzy and has not passed out. She states she has not felt anything this week. She states she is on spring break this week. She is wondering if her symptoms are stress related. She states her daughter is a  and states she get excited and nervous before her meets and is more stressed at work since [employer] started a year ago. She states she notices BLE edema at times, left more than right. States it goes away with elevation or overnight. She denies any chest pain. She reports she does not exercise. Review of Systems:         12 point ROS negative in all areas as listed below except as in [place]  Constitutional, EENT,pulmonary, GI, , Musculoskeletal, skin, neurological, hematological, endocrine, Psychiatric    Reviewed past medical history, social, and family history.    Teacher at [email or web address]   Never smoked   Father first MI age 48 -  And bypass  Grandfather - bypass  Mother - Diabetes on mother side    Past Medical History:   Diagnosis Date    Anxiety     Diabetes ([address]     GERD (gastroesophageal reflux disease)     Hyperlipidemia     Hypertension     [address] [address]      Past Surgical History:   Procedure Laterality Date    HYSTERECTOMY, TOTAL ABDOMINAL      Has not taking: Reported on 4/2/2021) 60 tablet 0    [DISCONTINUED] hydroCHLOROthiazide (HYDRODIURIL) 25 MG tablet Take 1 tablet by mouth daily (Patient not taking: Reported on 1/14/2021) 90 tablet 0     No facility-administered encounter medications on file as of 4/2/2021. Lab Data:  CBC: No results for input(s): WBC, HGB, HCT, MCV, PLT in the last 72 hours. BMP: No results for input(s): NA, K, CL, CO2, PHOS, BUN, CREATININE in the last 72 hours. Invalid input(s): CA  LIVER PROFILE: No results for input(s): AST, ALT, LIPASE, BILIDIR, BILITOT, ALKPHOS in the last 72 hours. Invalid input(s): AMYLASE,  ALB  LIPID:   Lab Results   Component Value Date    CHOL 183 03/17/2021    CHOL 192 11/12/2020    CHOL 208 (H) 06/20/2019     Lab Results   Component Value Date    TRIG 140 03/17/2021    TRIG 114 11/12/2020    TRIG 221 (H) 06/20/2019     Lab Results   Component Value Date    HDL 54 03/17/2021    HDL 60 11/12/2020    HDL 49 06/20/2019     Lab Results   Component Value Date    LDLCALC 101 (H) 03/17/2021    LDLCALC 109 (H) 11/12/2020    LDLCALC 115 (H) 06/20/2019     Lab Results   Component Value Date    LABVLDL 28 03/17/2021    LABVLDL 23 11/12/2020    LABVLDL 44 06/20/2019     No results found for: CHOLHDLRATIO  PT/INR: No results for input(s): PROTIME, INR in the last 72 hours. A1C:   Lab Results   Component Value Date    LABA1C 5.9 03/17/2021     BNP:  No results for input(s): BNP in the last 72 hours. IMAGING:   I have reviewed the following tests and documented in this encounter as follows:   Discussed with patient. EKG 12/25/20  Normal sinus rhythmPoor R wave progressionProlonged QTAbnormal ECGNo previous ECGs availableConfirmed by Chepe Smith MD, Dave Meléndez ([id number]) on 12/26/2020 9:24:12 AM     ECHO 8/11/2020  Summary   Normal LV systolic function with an estimated EF of 55%. No regional wall motion abnormalities are seen. Indeterminate diastolic function. No significant valvular heart disease noted. Inadequate tricuspid regurgitation to estimate systolic pulmonary artery   pressure. Cardiac Monitor 6/12/20-6/26/20  < 1% PAC's and PVC's, Brief arrhythmia  Would treat with BB or observe. Assessment:  Encounter Diagnoses   Name Primary?  Mixed hyperlipidemia     Palpitations Yes    Benign essential HTN        1. Mixed hyperlipidemia    2. Palpitations    3. Benign essential HTN     4. Prediabetes A1C 5.9  Poor physical conditioning      PLAN:  1. Recommend taking baby Asprin 81 mg many CAD risk factors  2. Recommend taking metoprolol 25 mg twice a day to help with palpitations. ~call if you have any issues with taking this medication   3. Recommend that your try to exercise daily  - walking daily - eating healthy   4. Follow up in 3 months   5. Advised daily exercise to improve stamina and reduce stress level        This note was scribed in the presence of Jacey Guillen MD by Ingrdi Anutnez RN.   I, Dr. Rafael Siu, personally performed the services described in this documentation, as scribed by the above signed scribe in my presence. It is both accurate and complete to my knowledge. I agree with the details independently gathered by the clinical support staff, while the remaining scribed note accurately describes my personal service to the patient.         [address] [place], MD 4/2/2021 3:59 PM

## 2021-04-26 ENCOUNTER — TELEPHONE (OUTPATIENT)
Dept: PRIMARY CARE CLINIC | Age: 51
End: 2021-04-26

## 2021-04-26 NOTE — TELEPHONE ENCOUNTER
Pt stopped in for nurses visit. Elevated BP, after stressful weekend. MA, confirmed elevated /80. I advised to take additional dose of norvasc as soon as she gets home today 4:30pm, then take normal dose before bed. She will stop by tomorrow and let us re-check her BP again. Has not started BB from cardiology or wellbutrin yet.

## 2021-04-29 ENCOUNTER — OFFICE VISIT (OUTPATIENT)
Dept: PRIMARY CARE CLINIC | Age: 51
End: 2021-04-29
Payer: COMMERCIAL

## 2021-04-29 VITALS
HEART RATE: 81 BPM | SYSTOLIC BLOOD PRESSURE: 130 MMHG | OXYGEN SATURATION: 97 % | DIASTOLIC BLOOD PRESSURE: 82 MMHG | TEMPERATURE: 97.7 F | RESPIRATION RATE: 18 BRPM

## 2021-04-29 DIAGNOSIS — R60.0 LEG EDEMA, LEFT: ICD-10-CM

## 2021-04-29 DIAGNOSIS — I10 BENIGN ESSENTIAL HTN: Primary | ICD-10-CM

## 2021-04-29 PROCEDURE — 99213 OFFICE O/P EST LOW 20 MIN: CPT | Performed by: PHYSICIAN ASSISTANT

## 2021-04-29 ASSESSMENT — ENCOUNTER SYMPTOMS
EYE DISCHARGE: 0
ABDOMINAL PAIN: 0
NAUSEA: 0
CHEST TIGHTNESS: 0
VOMITING: 0
SINUS PAIN: 0
SORE THROAT: 0
SINUS PRESSURE: 0
DIARRHEA: 0
EYE REDNESS: 0
RHINORRHEA: 0
CONSTIPATION: 0
SHORTNESS OF BREATH: 0
COUGH: 0

## 2021-04-29 NOTE — PROGRESS NOTES
2021     Lucia Cao (:  1970) is a 45 y.o. female, here for evaluation of the following medical concerns:    Chief Complaint   Patient presents with    Hypertension    Leg Swelling        HPI pt presents to follow up on HTN. +stress   +left leg swelling  Not started wellbutrin. Taken two doses of lopressor. Took at 8:30 pm 2 nights ago. Then again took a 1/2 dose last night. Helped with palpitations and anxiety, however felt a little brain fog the day after taking. Leg continues to swell if she sits too long. Review of Systems   Constitutional: Negative for chills and fever. HENT: Negative. Negative for congestion, rhinorrhea, sinus pressure, sinus pain and sore throat. Eyes: Negative for discharge, redness and visual disturbance. Respiratory: Negative for cough, chest tightness and shortness of breath. Cardiovascular: Negative for chest pain and palpitations. Gastrointestinal: Negative for abdominal pain, constipation, diarrhea, nausea and vomiting. Genitourinary: Negative for difficulty urinating, dysuria and frequency. Musculoskeletal: Negative. Skin: Negative. Neurological: Negative. Negative for dizziness, weakness, numbness and headaches. Psychiatric/Behavioral: Negative. All other systems reviewed and are negative. Prior to Visit Medications    Medication Sig Taking?  Authorizing Provider   esomeprazole (NEXIUM) 20 MG delayed release capsule Take 20 mg by mouth every morning (before breakfast)  Historical Provider, MD   metoprolol tartrate (LOPRESSOR) 25 MG tablet Take 1 tablet by mouth 2 times daily  Wm Arita MD   aspirin 81 MG EC tablet Take 81 mg by mouth daily  Historical Provider, MD   valACYclovir (VALTREX) 500 MG tablet Take 500 mg by mouth See Admin Instructions  Historical Provider, MD   buPROPion [facility] SR) 100 MG extended release tablet Take 1 tablet by mouth 2 times daily  Patient not taking: Reported on 2021  Lexie Romero Abdomen is soft. Tenderness: There is no abdominal tenderness. Musculoskeletal: Normal range of motion. Skin:     General: Skin is warm and dry. Capillary Refill: Capillary refill takes less than 2 seconds. Neurological:      General: No focal deficit present. Mental Status: She is alert and oriented to person, place, and time. Psychiatric:         Mood and Affect: Mood normal.         Behavior: Behavior normal.         ASSESSMENT/PLAN: non toxic, NAD. Mild HTN, however improved from last visits. Discussed stress reduction, incouraged 30 mins of physical activity every day. Discussed scheduling exercise time into her day. Discussed meal planning. Compression stocking for leg swelling, PRN. 1. Benign essential HTN  - continue Norvasc, Lopressor one half a 25mg tablet, every evening. 2. Leg edema, left  - compression stockings      Return in about 3 months (around 7/29/2021), or if symptoms worsen or fail to improve. An electronic signature was used to authenticate this note.           --Sindy Buckley PA-C on 4/29/2021 at 10:14 AM

## 2021-04-29 NOTE — PATIENT INSTRUCTIONS
Learning About Using Compression Stockings  What are compression stockings? Compression stockings may be used for problems like varicose veins, skin ulcers, and deep vein thrombosis. But there are different types of stockings, and they need to fit right. So your doctor will recommend what you need. These stockings are the most common treatment for varicose veins. They help the blood circulate in your legs. This can prevent skin ulcers and keep blood from building up in the legs. Prescription stockings are tightest at the foot. They get less and less tight farther up on your legs. The kind you buy without a prescription have lighter elastic. So the pressure is even all the way up the leg. These don't cost as much. But they don't provide the compression you need to treat serious symptoms or prevent skin ulcers. How do you use compression stockings? · If your stockings are new, you may want to wash them before you put them on. This can make them more flexible and easier to put on. It's a good idea to wash them by hand. · Most of the time it's best to put on your stockings early in the morning. This is when you have the least swelling in your legs. · Put silicone lotion (such as ALPS) or talcum powder on your legs to help the stockings slide on. If your stockings contain latex, or you aren't sure if they contain latex, do not use other types of lotions or creams on your legs when you wear the stockings. You may use other lotions or creams when you are not wearing the stockings. · Sit in a chair with a back while you put on the stockings. This gives you something to lean against as you pull them up. · How to put on stockings:  ? Turn your stocking inside out. Then put your toe in as far as it will go.  ? Readjust the stocking by folding it back onto itself at the ankle. Then hold both sides of the folded stocking.   ? Pull toward your body as far as you can.  ? Fold back the stocking again farther up on your leg. Then pull the stocking up to that point. ? Repeat folding back and pulling until the stocking is in the right place. · You may want to wear rubber gloves. They can help you hold the stockings better. · If your stockings don't have toes, use a silk \"slip sock. \" (You can get one from your medical supplier.) This will help the stocking slide over your foot better. When you're done, pull off the sock through the open toe. · If you still can't get your stockings on, you may want to use a \"stocking fall. \" This is a metal device that holds the stocking open while you step into it. It is often recommended for people who have problems grasping, leaning, or pulling. Before you buy one, try it first. Some people find them hard to use. What else should you know about compression stockings? · You will probably need to buy a new pair every 4 to 6 months. · They may feel tight or uncomfortable at first, but many people get used to them. · It is important to think about the pros and cons of stockings. You may not like them. But they may help relieve symptoms. Where can you learn more? Go to https://Eleme MedicalpePluribus Networkseb.Neograft Technologies. org and sign in to your Promimic account. Enter J166 in the ExSafe box to learn more about \"Learning About Using Compression Stockings. \"     If you do not have an account, please click on the \"Sign Up Now\" link. Current as of: March 4, 2020               Content Version: 12.8  © 2006-2021 "[employer]grade[employer]". Care instructions adapted under license by [facility] ([facility]). If you have questions about a medical condition or this instruction, always ask your healthcare professional. [place] [address] any warranty or liability for your use of this information. Leg and Ankle Edema: Care Instructions  Your Care Instructions  Swelling in the legs, ankles, and feet is called edema. It is common after you sit or stand for a while.  Long plane flights or car rides often cause swelling in the legs and feet. You may also have swelling if you have to stand for long periods of time at your job. Problems with the veins in the legs (varicose veins) and changes in hormones can also cause swelling. Sometimes the swelling in the ankles and feet is caused by a more serious problem, such as heart failure, infection, blood clots, or liver or kidney disease. Follow-up care is a key part of your treatment and safety. Be sure to make and go to all appointments, and call your doctor if you are having problems. It's also a good idea to know your test results and keep a list of the medicines you take. How can you care for yourself at home? · If your doctor gave you medicine, take it as prescribed. Call your doctor if you think you are having a problem with your medicine. · Whenever you are resting, raise your legs up. Try to keep the swollen area higher than the level of your heart. · Take breaks from standing or sitting in one position. ? Walk around to increase the blood flow in your lower legs. ? Move your feet and ankles often while you stand, or tighten and relax your leg muscles. · Wear support stockings. Put them on in the morning, before swelling gets worse. · Eat a balanced diet. Lose weight if you need to. · Limit the amount of salt (sodium) in your diet. Salt holds fluid in the body and may increase swelling. When should you call for help? Call 911 anytime you think you may need emergency care. For example, call if:    · You have symptoms of a blood clot in your lung (called a pulmonary embolism). These may include:  ? Sudden chest pain. ? Trouble breathing. ? Coughing up blood. Call your doctor now or seek immediate medical care if:    · You have signs of a blood clot, such as:  ? Pain in your calf, back of the knee, thigh, or groin. ?  Redness and swelling in your leg or groin.     · You have symptoms of infection, such as:  ? Increased pain, swelling, warmth, or redness. ? Red streaks or pus. ? A fever. Watch closely for changes in your health, and be sure to contact your doctor if:    · Your swelling is getting worse.     · You have new or worsening pain in your legs.     · You do not get better as expected. Where can you learn more? Go to https://chpevikiewmitul.[email or web address] org and sign in to your Porous Power account. Enter J817 in the SavaJe Technologies box to learn more about \"Leg and Ankle Edema: Care Instructions. \"     If you do not have an account, please click on the \"Sign Up Now\" link. Current as of: February 26, 2020               Content Version: 12.8  © 2006-2021 Tipjoy. Care instructions adapted under license by [facility] ([facility]). If you have questions about a medical condition or this instruction, always ask your healthcare professional. [place] [address] any warranty or liability for your use of this information. Elevated Blood Pressure: Care Instructions  Your Care Instructions    Blood pressure is a measure of how hard the blood pushes against the walls of your arteries. It's normal for blood pressure to go up and down throughout the day. But if it stays up over time, you have high blood pressure. Two numbers tell you your blood pressure. The first number is the systolic pressure. It shows how hard the blood pushes when your heart is pumping. The second number is the diastolic pressure. It shows how hard the blood pushes between heartbeats, when your heart is relaxed and filling with blood. An ideal blood pressure in adults is less than 120/80 (say \"120 over 80\"). High blood pressure is 140/90 or higher. You have high blood pressure if your top number is 140 or higher or your bottom number is 90 or higher, or both. The main test for high blood pressure is simple, fast, and painless. To diagnose high blood pressure, your doctor will test your blood pressure at different times.  After testing your blood pressure, your doctor may ask you to test it again when you are home. If you are diagnosed with high blood pressure, you can work with your doctor to make a long-term plan to manage it. Follow-up care is a key part of your treatment and safety. Be sure to make and go to all appointments, and call your doctor if you are having problems. It's also a good idea to know your test results and keep a list of the medicines you take. How can you care for yourself at home? · Do not smoke. Smoking increases your risk for heart attack and stroke. If you need help quitting, talk to your doctor about stop-smoking programs and medicines. These can increase your chances of quitting for good. · Stay at a healthy weight. · Try to limit how much sodium you eat to less than 2,300 milligrams (mg) a day. Your doctor may ask you to try to eat less than 1,500 mg a day. · Be physically active. Get at least 30 minutes of exercise on most days of the week. Walking is a good choice. You also may want to do other activities, such as running, swimming, cycling, or playing tennis or team sports. · Avoid or limit alcohol. Talk to your doctor about whether you can drink any alcohol. · Eat plenty of fruits, vegetables, and low-fat dairy products. Eat less saturated and total fats. · Learn how to check your blood pressure at home. When should you call for help? Call your doctor now or seek immediate medical care if:    · Your blood pressure is much higher than normal (such as 180/110 or higher).     · You think high blood pressure is causing symptoms such as:  ¨ Severe headache. ¨ Blurry vision.    Watch closely for changes in your health, and be sure to contact your doctor if:    · You do not get better as expected. Where can you learn more? Go to https://chkiriteb.[email or web address] org and sign in to your [employer] account.  Enter N710 in the Janrain box to learn more about \"Elevated Blood Pressure: Care Instructions. \"     If you do not have an account, please click on the \"Sign Up Now\" link. Current as of: May 10, 2017  Content Version: 11.6  © 5829-6167 Com[employer]ility, Incorporated. Care instructions adapted under license by [facility] ([facility]). If you have questions about a medical condition or this instruction, always ask your healthcare professional. [address] any warranty or liability for your use of this information.

## 2021-05-06 ENCOUNTER — TELEPHONE (OUTPATIENT)
Dept: PRIMARY CARE CLINIC | Age: 51
End: 2021-05-06

## 2021-05-06 DIAGNOSIS — I10 ESSENTIAL HYPERTENSION: Primary | ICD-10-CM

## 2021-05-06 RX ORDER — AMLODIPINE BESYLATE 5 MG/1
TABLET ORAL
Qty: 90 TABLET | Refills: 3 | Status: SHIPPED
Start: 2021-05-06 | End: 2022-03-25 | Stop reason: DRUGHIGH

## 2021-06-08 ENCOUNTER — OFFICE VISIT (OUTPATIENT)
Dept: PRIMARY CARE CLINIC | Age: 51
End: 2021-06-08
Payer: COMMERCIAL

## 2021-06-08 VITALS
HEIGHT: 61 IN | DIASTOLIC BLOOD PRESSURE: 80 MMHG | OXYGEN SATURATION: 98 % | WEIGHT: 171 LBS | BODY MASS INDEX: 32.28 KG/M2 | HEART RATE: 90 BPM | SYSTOLIC BLOOD PRESSURE: 120 MMHG

## 2021-06-08 DIAGNOSIS — F41.9 ANXIETY: Primary | ICD-10-CM

## 2021-06-08 PROCEDURE — 99213 OFFICE O/P EST LOW 20 MIN: CPT | Performed by: PHYSICIAN ASSISTANT

## 2021-06-08 RX ORDER — HYDROXYZINE 50 MG/1
50 TABLET, FILM COATED ORAL 3 TIMES DAILY PRN
Qty: 30 TABLET | Refills: 0 | Status: SHIPPED | OUTPATIENT
Start: 2021-06-08 | End: 2021-10-15

## 2021-06-08 ASSESSMENT — ENCOUNTER SYMPTOMS
CHEST TIGHTNESS: 0
NAUSEA: 0
VOMITING: 0
ABDOMINAL PAIN: 0
COUGH: 0
COLOR CHANGE: 0
VISUAL CHANGE: 0
SHORTNESS OF BREATH: 0
DIARRHEA: 0

## 2021-06-08 NOTE — PROGRESS NOTES
2021     Larisa Tavarez (:  1970) is a 45 y.o. female, here for evaluation of the following medical concerns:    Chief Complaint   Patient presents with    Anxiety        Mental Health Problem  Primary symptoms comment: anxious episodes. The current episode started more than 1 month ago. This is a recurrent problem. The onset of the illness is precipitated by a stressful event and emotional stress. The degree of incapacity that she is experiencing as a consequence of her illness is moderate. Additional symptoms of the illness include agitation. Additional symptoms of the illness do not include anhedonia, insomnia, hypersomnia, appetite change, unexpected weight change, fatigue, psychomotor retardation, feelings of worthlessness, attention impairment, euphoric mood, increased goal-directed activity, flight of ideas, inflated self-esteem, decreased need for sleep, distractible, poor judgment, visual change, headaches, abdominal pain or seizures. She does not admit to suicidal ideas. She does not have a plan to attempt suicide. She does not contemplate harming herself. She does not contemplate injuring another person. She has not already  injured another person. Review of Systems   Constitutional: Negative for appetite change, chills, fatigue, fever and unexpected weight change. HENT: Negative. Respiratory: Negative for cough, chest tightness and shortness of breath. Cardiovascular: Negative for chest pain. Gastrointestinal: Negative for abdominal pain, diarrhea, nausea and vomiting. Genitourinary: Negative. Musculoskeletal: Negative. Skin: Negative for color change and rash. Neurological: Negative. Negative for seizures and headaches. Hematological: Negative. Psychiatric/Behavioral: Positive for agitation. The patient is nervous/anxious. The patient does not have insomnia. All other systems reviewed and are negative.       Prior to Visit Medications    Medication Sig Taking? Authorizing Provider   hydrOXYzine (ATARAX) 50 MG tablet Take 1 tablet by mouth 3 times daily as needed for Anxiety May sub Vistaril. Yes Sindy Buckley PA-C   amLODIPine (NORVASC) 5 MG tablet TAKE ONE TABLET BY MOUTH DAILY Yes Sindy Buckley PA-C   esomeprazole (NEXIUM) 20 MG delayed release capsule Take 20 mg by mouth every morning (before breakfast) Yes Historical Provider, MD   metoprolol tartrate (LOPRESSOR) 25 MG tablet Take 1 tablet by mouth 2 times daily Yes Tosha Arndt MD   aspirin 81 MG EC tablet Take 81 mg by mouth daily Yes Historical Provider, MD   valACYclovir (VALTREX) 500 MG tablet Take 500 mg by mouth See Admin Instructions Yes Historical Provider, MD   esomeprazole (NEXIUM) 20 MG delayed release capsule Take 1 capsule by mouth daily TAKE ONE CAPSULE BY MOUTH DAILY Yes THAO Menendez CNP   atorvastatin (LIPITOR) 10 MG tablet Take 1 tablet by mouth daily Yes THAO Menendez CNP   Cholecalciferol (VITAMIN D3) 5000 units TABS Take by mouth Yes Historical Provider, MD   Cyanocobalamin (VITAMIN B-12 PO) Take 1,200 mg by mouth daily Yes Historical Provider, MD   buPROPion (WELLBUTRIN SR) 100 MG extended release tablet Take 1 tablet by mouth 2 times daily  Patient not taking: Reported on 4/2/2021  Sindy Buckley PA-C   ondansetron (ZOFRAN) 4 MG tablet Take 1 tablet by mouth 3 times daily as needed for Nausea or Vomiting  Patient not taking: Reported on 6/8/2021  THAO Arevalo CNP        Social History     Tobacco Use    Smoking status: Never Smoker    Smokeless tobacco: Never Used   Substance Use Topics    Alcohol use: Yes        Vitals:    06/08/21 1042   BP: 120/80   Site: Left Upper Arm   Position: Sitting   Cuff Size: Medium Adult   Pulse: 90   SpO2: 98%   Weight: 171 lb (77.6 kg)   Height: 5' 1\" (1.549 m)     Estimated body mass index is 32.31 kg/m² as calculated from the following:    Height as of this encounter: 5' 1\" (1.549 m).     Weight as of this encounter: 171 lb (77.6 kg). Physical Exam  Vitals and nursing note reviewed. Constitutional:       Appearance: She is well-developed. She is not diaphoretic. HENT:      Head: Normocephalic and atraumatic. Nose: Nose normal.      Mouth/Throat:      Pharynx: No oropharyngeal exudate. Eyes:      General:         Right eye: No discharge. Left eye: No discharge. Conjunctiva/sclera: Conjunctivae normal.      Pupils: Pupils are equal, round, and reactive to light. Cardiovascular:      Rate and Rhythm: Normal rate and regular rhythm. Heart sounds: Normal heart sounds. No murmur heard. No friction rub. No gallop. Pulmonary:      Effort: Pulmonary effort is normal. No respiratory distress. Breath sounds: Normal breath sounds. No wheezing. Abdominal:      General: Bowel sounds are normal. There is no distension. Palpations: Abdomen is soft. Tenderness: There is no abdominal tenderness. Musculoskeletal:         General: Normal range of motion. Cervical back: Normal range of motion and neck supple. Skin:     General: Skin is warm and dry. Coloration: Skin is not pale. Neurological:      Mental Status: She is alert and oriented to person, place, and time. Psychiatric:         Attention and Perception: Attention and perception normal.         Mood and Affect: Mood is anxious. Speech: Speech normal.         Behavior: Behavior normal.         Thought Content: Thought content normal.         Cognition and Memory: Cognition and memory normal.         Judgment: Judgment normal.      Comments: Anxious episodes, intermittent. Become debilitating hard for her to control. Episodes occur with family activity, concerned about college orientation next week. +tearful at time, feels \"fine\" today. ASSESSMENT/PLAN: pt continuing to have episodic anxiety. She has not started taking busbar, or wellbutrin.  Lexapro prescribed by past provider, she never started. Gets very nervous thinking about medication side effects. Would like to try acute tx for anxious thoughts. Palpitations under good control. BP under better control at 120/80. Will consider appt with Dr. Falguni Hogue to discuss better coping tools for managing acute anxious symptoms. 1. Anxiety  - hydrOXYzine (ATARAX) 50 MG tablet; Take 1 tablet by mouth 3 times daily as needed for Anxiety May sub Vistaril. Dispense: 30 tablet; Refill: 0      Return if symptoms worsen or fail to improve. An electronic signature was used to authenticate this note.           --Sindy Buckley PA-C on 6/9/2021 at 8:49 AM

## 2021-06-08 NOTE — PATIENT INSTRUCTIONS
We will call you to set up an appt with Dr. Abby Dorantes. Anxiety Disorder: Care Instructions  Your Care Instructions     Anxiety is a normal reaction to stress. Difficult situations can cause you to have symptoms such as sweaty palms and a nervous feeling. In an anxiety disorder, the symptoms are far more severe. Constant worry, muscle tension, trouble sleeping, nausea and diarrhea, and other symptoms can make normal daily activities difficult or impossible. These symptoms may occur for no reason, and they can affect your work, school, or social life. Medicines, counseling, and self-care can all help. Follow-up care is a key part of your treatment and safety. Be sure to make and go to all appointments, and call your doctor if you are having problems. It's also a good idea to know your test results and keep a list of the medicines you take. How can you care for yourself at home? · Take medicines exactly as directed. Call your doctor if you think you are having a problem with your medicine. · Go to your counseling sessions and follow-up appointments. · Recognize and accept your anxiety. Then, when you are in a situation that makes you anxious, say to yourself, \"This is not an emergency. I feel uncomfortable, but I am not in danger. I can keep going even if I feel anxious. \"  · Be kind to your body:  ? Relieve tension with exercise or a massage. ? Get enough rest.  ? Avoid alcohol, caffeine, nicotine, and illegal drugs. They can increase your anxiety level and cause sleep problems. ? Learn and do relaxation techniques. See below for more about these techniques. · Engage your mind. Get out and do something you enjoy. Go to a funny movie, or take a walk or hike. Plan your day. Having too much or too little to do can make you anxious. · Keep a record of your symptoms. Discuss your fears with a good friend or family member, or join a support group for people with similar problems.  Talking to others sometimes if you are not fully alert. Never play a relaxation tape while you drive a car. When should you call for help? Call 911 anytime you think you may need emergency care. For example, call if:    · You feel you cannot stop from hurting yourself or someone else. Keep the numbers for these national suicide hotlines: 5-610-736-TALK (0-255.614.4442) and 5-658-NOANVMD (6-132.741.1345). If you or someone you know talks about suicide or feeling hopeless, get help right away. Watch closely for changes in your health, and be sure to contact your doctor if:    · You have anxiety or fear that affects your life.     · You have symptoms of anxiety that are new or different from those you had before. Where can you learn more? Go to https://COINPLUSpepiceweb.Epay Systems. org and sign in to your Dial a Dealer account. Enter P754 in the [employer] box to learn more about \"Anxiety Disorder: Care Instructions. \"     If you do not have an account, please click on the \"Sign Up Now\" link. Current as of: September 23, 2020               Content Version: 12.8  © 4119-0197 Healthwise, Incorporated. Care instructions adapted under license by [facility] ([facility]). If you have questions about a medical condition or this instruction, always ask your healthcare professional. [address] any warranty or liability for your use of this information.

## 2021-07-23 ENCOUNTER — VIRTUAL VISIT (OUTPATIENT)
Dept: PSYCHOLOGY | Age: 51
End: 2021-07-23
Payer: COMMERCIAL

## 2021-07-23 DIAGNOSIS — F41.9 ANXIETY DISORDER, UNSPECIFIED TYPE: Primary | ICD-10-CM

## 2021-07-23 PROCEDURE — 90791 PSYCH DIAGNOSTIC EVALUATION: CPT | Performed by: PSYCHOLOGIST

## 2021-07-23 NOTE — PROGRESS NOTES
Behavioral Health Consultation  Noe Ramos Psy.D. Psychologist  7/23/2021        Time spent with patient and/or patient family: 50 minutes  This is patient's first [facility] appointment. Reason for Consult:    Chief Complaint   Patient presents with    Anxiety     Referring Provider: Oanh Avelar PA-C  6 [facility]    Patient or patient's guardian provided informed consent for the behavioral health program. Discussed with patient/guardian model of service to include the limits of confidentiality (i.e. abuse reporting, suicide intervention, etc.) and short-term intervention focused approach. Patient/guardian indicated understanding. Feedback given to PCP. TELEHEALTH VISIT -- Audio/Visual (During KNG-51 public health emergency)  }  Pursuant to the emergency declaration under the [facility], [facility], [facility] waiver authority and the [employer] Act, this Virtual Visit was conducted, with patient's consent, to reduce the patient's risk of exposure to COVID-19 and provide continuity of care for an established patient. Services were provided through a video synchronous discussion virtually to substitute for in-person clinic visit. Pt gave verbal informed consent to participate in telehealth services. Conducted a risk-benefit analysis and determined that the patient's presenting problems are consistent with the use of telepsychology. Determined that the patient or patient guardian has sufficient knowledge and skills in the use of technology enabling them to adequately benefit from telepsychology. It was determined that this patient was able to be properly treated without an in-person session. Patient or guardian verified that they were currently located at the [facility] address that was provided during registration. Reviewed telehealth consent form with patient and they provided verbal consent.     Verified the following information:  Patient's identification: Yes  Patient location: [address]  Patient's call back number: [phone]   Patient's emergency contact's name and number, as well as permission to contact them if needed: Extended Emergency Contact Information  Primary Emergency Contact: Naeem Guerra  Address: [place], [address] of [address] Phone: [phone]  Relation: Spouse     Provider location: [facility], [facility]     S:  Family:  Hollice Duverney resides in her home with her  and two daughters. She has no concerns related to her marriage, although she knows her marriage will transition some when her youngest goes to college next month. Family history is significant for anxiety and depression (mother). Health/Development:  Hollice Duverney reports that she has high blood pressure and cholesterol issues which she takes medication for. During the school day, her blood pressure would increase, ears would turn red, would have to be checked at the PCP in the office. Hollice Duverney does not have difficulties sleeping. Hollice Duverney does have diet concerns. She is trying to eat out less and trying to incorporate more fruits and vegetable. Her appetite is pretty consistent. She was drinking four mini cans of Cokes per day, occasional sweet tea. She has decreased the Coke intake. She does experience frequent upset stomach from eating out fairly regularly. Regarding exercise, Hollice Duverney has been trying to increase her movement, her joints have been bothering her. She is walking about a mile and a half once or twice per day. Hollice Duverney does have concerns regarding her use of technology. Specifically, she uses her phone frequently to look things up, is on social media some. No significant legal concerns. She has a glass or wine rarely.     Emotional/Social:  In regards to attention, hyperactivity, and impulsivity, Hollice Duverney reports that she tends to multi-task, multiple crafts at one time. Post-it's, lists,  Sunny Cerda has not been previously diagnosed with ADHD. Sunny Cerda was a good student throughout school. Sunny Cerda does not report feeling that she experiences more sadness than others. However, she notes times of depression, particularly following giving birth. Sunny Cerda does report feeling that she experiences more anxiety than others. Specifically, she tends to worry about her daughter's transition to school, she is anxious about driving for long periods. She does not fly due to motion sickness. She is anxious about riding with others. She indicated that she does not experience OCD-like symptoms. Sunny Cerda reported no history of physical or sexual abuse and indicated no current or prior suicidal or homicidal ideation, intent, or plan in Sunny Cerda. Sunny Cerda reports some suicidal thoughts while depressed immediately after college. She does not have a history of self-harm behaviors. She has participated in counseling or therapy before. She has tried therapy a few times over the years, reports that she did not connect with the therapist or did not continue with it. She was not nervous about the appointment. Sunny Cerda does not have a history of body focused repetitive behaviors (e.g., skin picking, hair pulling). Socially, Sunny Cerda has some friends from work she talks to regularly. They are hoping to start socializing more. Sunny Cerda loves to read and does so every day. She enjoys playing golf. She pretty, crafting, sewing.     O:  MSE:  Appearance    alert, cooperative  Appetite normal  Sleep disturbance No  Fatigue No  Loss of pleasure No  Impulsive behavior No  Speech    normal rate and normal volume  Mood    euthymic   Affect    anxiety  Thought Content    intact  Thought Process    linear  Associations    logical connections  Insight    Good  Judgment    Intact  Orientation    oriented to person, place, time, and general circumstances  Memory    recent and remote memory intact  Attention/Concentration intact  Morbid ideation No  Suicide Assessment    no suicidal ideation    A:  Gary Ann presents for an initial [facility] appointment regarding concerns related to anxiety. No safety concerns reported at this time. Diagnosis:  1. Anxiety disorder, unspecified type          Plan:  Pt interventions:  Gathered relevant background information and discussed [facility] role. Agreed to follow up [facility] consultation regarding anxiety management skills.

## 2021-07-28 ENCOUNTER — VIRTUAL VISIT (OUTPATIENT)
Dept: PSYCHOLOGY | Age: 51
End: 2021-07-28
Payer: COMMERCIAL

## 2021-07-28 DIAGNOSIS — F41.9 ANXIETY DISORDER, UNSPECIFIED TYPE: Primary | ICD-10-CM

## 2021-07-28 PROCEDURE — 90832 PSYTX W PT 30 MINUTES: CPT | Performed by: PSYCHOLOGIST

## 2021-07-28 NOTE — PROGRESS NOTES
Behavioral Health Consultation  Ceasar Langley Psy.D. Psychologist  7/28/2021      Time spent with Patient: 25 minutes  This is patient's second [facility] appointment. Reason for Consult:    Chief Complaint   Patient presents with    Anxiety     Referring Provider: Donovan Priest PA-C  6 [facility]    Feedback given to PCP. TELEHEALTH VISIT -- Audio/Visual (During [id number] public health emergency)  }  Pursuant to the emergency declaration under the [facility], [facility] waiver authority and the [employer] Act, this Virtual Visit was conducted, with patient's consent, to reduce the patient's risk of exposure to COVID-19 and provide continuity of care for an established patient. Services were provided through a video synchronous discussion virtually to substitute for in-person clinic visit. Pt gave verbal informed consent to participate in telehealth services. Conducted a risk-benefit analysis and determined that the patient's presenting problems are consistent with the use of telepsychology. Determined that the patient and/or guardian has sufficient knowledge and skills in the use of technology enabling them to adequately benefit from telepsychology. It was determined that this patient was able to be properly treated without an in-person session. Patient or guardian verified that they were currently located at the [facility] address that was provided during registration. Discussed consent form for telehealth and patient or guardian provided verbal consent.     Verified the following information:  Patient's identification: Yes  Patient location: [address] [place] [address]   Patient's call back number: [phone]   Patient's emergency contact's name and number, as well as permission to contact them if needed: Extended Emergency Contact Information  Primary Emergency Contact: Naeem Guerra  Address: [address] [facility], [address] [facility] of [address] Phone: [phone]  Relation: Spouse     Provider location: [facility], [facility] [address]:  Audley Hamman reports she and her family have been busy preparing for upcoming return to school. She reports that anxiety has been stable. She continues to work toward improvements in diet and exercise habits. O:  MSE:  Appearance    alert, cooperative  Appetite normal  Sleep disturbance No  Fatigue Yes  Loss of pleasure No  Impulsive behavior No  Speech    normal rate and normal volume  Mood    euthymic   Affect    normal affect  Thought Content    intact  Thought Process    linear  Associations    logical connections  Insight    Good  Judgment    Intact  Orientation    oriented to person, place, time, and general circumstances  Memory    recent and remote memory intact  Attention/Concentration    intact  Morbid ideation No  Suicide Assessment    no suicidal ideation    A:  Audley Hamman returns for a follow up [facility] appointment regarding concerns related to anxiety. These symptoms are stable. No safety concerns reported at this time. Diagnosis:  1. Anxiety disorder, unspecified type        Plan:  Pt interventions:  Reviewed the role diet, movement, sleep and social interactions have on anxiety and mood regulation. Briefly introduced concept of relaxation strategies for anxiety management; she will try these ahead of next appointment.

## 2021-08-02 ENCOUNTER — TELEPHONE (OUTPATIENT)
Dept: PRIMARY CARE CLINIC | Age: 51
End: 2021-08-02

## 2021-08-02 NOTE — TELEPHONE ENCOUNTER
Lesia Nicole called wanting to know if she can take the Hydroxyzine and or Bupropion with her Metoprolol. She only takes one or the other and not both at the same time. She is just wondering if she can take them with the beta blocker. Please advise.

## 2021-08-03 ENCOUNTER — OFFICE VISIT (OUTPATIENT)
Dept: PRIMARY CARE CLINIC | Age: 51
End: 2021-08-03
Payer: COMMERCIAL

## 2021-08-03 VITALS
HEART RATE: 80 BPM | TEMPERATURE: 97.9 F | OXYGEN SATURATION: 98 % | BODY MASS INDEX: 32.5 KG/M2 | SYSTOLIC BLOOD PRESSURE: 114 MMHG | WEIGHT: 172 LBS | DIASTOLIC BLOOD PRESSURE: 80 MMHG

## 2021-08-03 DIAGNOSIS — R00.2 PALPITATIONS: ICD-10-CM

## 2021-08-03 DIAGNOSIS — F41.9 ANXIETY: Primary | ICD-10-CM

## 2021-08-03 DIAGNOSIS — R53.83 FATIGUE, UNSPECIFIED TYPE: ICD-10-CM

## 2021-08-03 LAB — HBA1C MFR BLD: 6.2 %

## 2021-08-03 PROCEDURE — 83036 HEMOGLOBIN GLYCOSYLATED A1C: CPT | Performed by: PHYSICIAN ASSISTANT

## 2021-08-03 PROCEDURE — 99213 OFFICE O/P EST LOW 20 MIN: CPT | Performed by: PHYSICIAN ASSISTANT

## 2021-08-03 ASSESSMENT — ENCOUNTER SYMPTOMS
RHINORRHEA: 0
VOMITING: 0
CHEST TIGHTNESS: 0
DIARRHEA: 0
COUGH: 0
COLOR CHANGE: 0
EYE REDNESS: 0
SHORTNESS OF BREATH: 0
EYE DISCHARGE: 0
ABDOMINAL PAIN: 0
NAUSEA: 0
SORE THROAT: 0
SINUS PRESSURE: 0
CONSTIPATION: 0
SINUS PAIN: 0

## 2021-08-03 NOTE — PROGRESS NOTES
8/3/2021     Shyanne Duran (:  1970) is a 45 y.o. female, here for evaluation of the following medical concerns:    Chief Complaint   Patient presents with    Medication Check        HPI taking half of her lopressor every morning, if pt takes as prescribed she feels light headed. Increasing anxiety. Lots of stress will be moving in the next month. Tried walking and deep breathing. Increased stress with school starting. Daughter leaves for college Aug 12, pt seeing Psychology. Feeling light headed/ woozy/ feeling like her sugars are low. Discuss the anxiety medications. Hydroxyzine: Not taking  Beta-blocker: 1/2 tab once daily  Wellbutrin: Not taking. Did not try     Just not taking them. Did not like the side effects of anxiolytics in the past. Since had not started them yet and unable to manage anxiety without them, is looking to discuss what to start with. Attempted deep breathing and exercise (walking daily). Increased stress right now (youngest moving to college next weekend, sold and bought a house, starting new school year, changed classroom). Believes it will all work out but having difficultly managing in the moment. No thoughts of self-harm or suicide. Buspar - Tried a couple of years ago. Thought it was helping for a few weeks and then side-effects kicked in (nauseus, light-headed, feeling of vomit in the throat)    Zoloft, Prozac - approximately 20 years. Unsure if they were helpful. Feeling off recently. \"Woozy\". Feeling need to eat. Borderline diabetic and feeling of need to eat because of light-headedness. Has constant nervous feeling and butterflies in her stomach. Review of Systems   Constitutional: Negative for chills, fatigue and fever. HENT: Negative. Negative for congestion, rhinorrhea, sinus pressure, sinus pain and sore throat. Eyes: Negative for discharge, redness and visual disturbance.    Respiratory: Negative for cough, chest tightness and shortness of breath. Cardiovascular: Positive for palpitations. Negative for chest pain. Gastrointestinal: Negative for abdominal pain, constipation, diarrhea, nausea and vomiting. Genitourinary: Negative. Negative for difficulty urinating, dysuria and frequency. Musculoskeletal: Negative. Skin: Negative. Negative for color change and rash. Neurological: Negative. Negative for dizziness, weakness, numbness and headaches. Hematological: Negative. Psychiatric/Behavioral: Positive for agitation, decreased concentration and dysphoric mood. All other systems reviewed and are negative. Prior to Visit Medications    Medication Sig Taking? Authorizing Provider   hydrOXYzine (ATARAX) 50 MG tablet Take 1 tablet by mouth 3 times daily as needed for Anxiety May sub Vistaril.  Yes Sindy Buckley PA-C   amLODIPine (NORVASC) 5 MG tablet TAKE ONE TABLET BY MOUTH DAILY Yes Sindy Buckley PA-C   esomeprazole (NEXIUM) 20 MG delayed release capsule Take 20 mg by mouth every morning (before breakfast) Yes Historical Provider, MD   metoprolol tartrate (LOPRESSOR) 25 MG tablet Take 1 tablet by mouth 2 times daily Yes Cirilo Frost MD   aspirin 81 MG EC tablet Take 81 mg by mouth daily Yes Historical Provider, MD   valACYclovir (VALTREX) 500 MG tablet Take 500 mg by mouth See Admin Instructions Yes Historical Provider, MD   esomeprazole (NEXIUM) 20 MG delayed release capsule Take 1 capsule by mouth daily TAKE ONE CAPSULE BY MOUTH DAILY Yes THAO Bear CNP   atorvastatin (LIPITOR) 10 MG tablet Take 1 tablet by mouth daily Yes THAO Bear CNP   Cholecalciferol (VITAMIN D3) 5000 units TABS Take by mouth Yes Historical Provider, MD   Cyanocobalamin (VITAMIN B-12 PO) Take 1,200 mg by mouth daily Yes Historical Provider, MD   buPROPion (WELLBUTRIN SR) 100 MG extended release tablet Take 1 tablet by mouth 2 times daily  Patient not taking: Reported on 4/2/2021  Sindy Buckley PA-C ondansetron (ZOFRAN) 4 MG tablet Take 1 tablet by mouth 3 times daily as needed for Nausea or Vomiting  Patient not taking: Reported on 6/8/2021  THAO Ellsworth CNP        Social History     Tobacco Use    Smoking status: Never Smoker    Smokeless tobacco: Never Used   Substance Use Topics    Alcohol use: Yes        Vitals:    08/03/21 1301   BP: 114/80   Pulse: 80   Temp: 97.9 °F (36.6 °C)   TempSrc: Oral   SpO2: 98%   Weight: 172 lb (78 kg)     Estimated body mass index is 32.5 kg/m² as calculated from the following:    Height as of 6/8/21: 5' 1\" (1.549 m). Weight as of this encounter: 172 lb (78 kg). Physical Exam  Vitals and nursing note reviewed. Constitutional:       Appearance: She is well-developed. She is not diaphoretic. HENT:      Head: Normocephalic. Nose: Nose normal.      Mouth/Throat:      Pharynx: No oropharyngeal exudate. Eyes:      General:         Right eye: No discharge. Left eye: No discharge. Conjunctiva/sclera: Conjunctivae normal.      Pupils: Pupils are equal, round, and reactive to light. Cardiovascular:      Rate and Rhythm: Normal rate and regular rhythm. Pulses: Normal pulses. Heart sounds: Normal heart sounds. No murmur heard. No friction rub. No gallop. Pulmonary:      Effort: Pulmonary effort is normal. No respiratory distress. Breath sounds: Normal breath sounds. No wheezing. Abdominal:      General: Bowel sounds are normal. There is no distension. Palpations: Abdomen is soft. Tenderness: There is no abdominal tenderness. Musculoskeletal:         General: Normal range of motion. Cervical back: Normal range of motion. Right lower leg: No edema. Left lower leg: No edema. Skin:     General: Skin is warm and dry. Capillary Refill: Capillary refill takes less than 2 seconds. Neurological:      General: No focal deficit present.       Mental Status: She is alert and oriented to person, place, and time. GCS: GCS eye subscore is 4. GCS verbal subscore is 5. GCS motor subscore is 6. Cranial Nerves: Cranial nerves are intact. Sensory: Sensation is intact. Motor: Motor function is intact. Coordination: Coordination is intact. Gait: Gait is intact. Psychiatric:         Attention and Perception: Attention and perception normal.         Mood and Affect: Mood normal.         Behavior: Behavior normal. Behavior is cooperative. Thought Content: Thought content normal.         Cognition and Memory: Cognition and memory normal.         Judgment: Judgment normal.      Comments: +pt tearful, reports increasing stress and anxiety. Feels she has a not \"normal\" reaction to stress. Very upset about Daughter leaving for school, does not want to take her to school, and then upset her by being emotional dropping her off. Sold house/bought new house, but does not really want to move. Very scared about taking medications for her mood. ASSESSMENT/PLAN: Nontoxic patient in no acute distress well-known to the practice. Patient presents with increasing stress and anxiety. Patient has not taken past medications written. Patient was concerned after reading about the side effects. Patient has been taking 12 mg a day of beta-blocker, if she takes higher dose than she feels lightheaded throughout the day. We discussed increasing the beta-blocker dosage versus starting Wellbutrin or other medication to help with anxiety and depression patient advised she is willing to start Wellbutrin. Discussed dosing. With her gradual taper on dosing. We will follow up with patient in 2 weeks time to make sure that she is being compliant and has no further needs. Also checked patient's A1c as patient felt like she was having episodes of having low blood sugar.   Provided patient with a over-the-counter glucose monitor and discussed healthy habits to help keep her insulin level more steady. 1. Anxiety  Wellbutrin, atarax  - POCT glycosylated hemoglobin (Hb A1C)    2. Palpitations  Continue lopressor  - POCT glycosylated hemoglobin (Hb A1C)    3. Fatigue, unspecified type  Sleep hygiene, 30 mins a day of aerobic activity, check blood glucose frequently (may need to consider metformin, down the road after anxiety levels have decreased)  - POCT glycosylated hemoglobin (Hb A1C)      Return in about 1 week (around 8/10/2021), or if symptoms worsen or fail to improve. An electronic signature was used to authenticate this note.           --Sindy Buckley PA-C on 8/5/2021 at 8:47 AM

## 2021-08-03 NOTE — PROGRESS NOTES
Discuss the anxiety medications. Hydroxyzine: Not taking  Beta-blocker: 1/2 tab once daily  Wellbutrin: Not taking. Did not try     Just not taking them. Did not like the side effects of anxiolytics in the past. Since had not started them yet and unable to manage anxiety without them, is looking to discuss what to start with. Attempted deep breathing and exercise (walking daily). Increased stress right now (youngest moving to college next weekend, sold and bought a house, starting new school year, changed classroom). Believes it will all work out but having difficultly managing in the moment. No thoughts of self-harm or suicide. Buspar - Tried a couple of years ago. Thought it was helping for a few weeks and then side-effects kicked in (nauseus, light-headed, feeling of vomit in the throat)    Zoloft, Prozac - approximately 20 years. Unsure if they were helpful. Feeling off recently. \"Woozy\". Feeling need to eat. Borderline diabetic and feeling of need to eat because of light-headedness. Has constant nervous feeling and butterflies in her stomach.

## 2021-08-03 NOTE — PATIENT INSTRUCTIONS
One half of one tablet (wellbutrin/ bupropion) per day for first 3-4 days, then one half of a tablet twice a day for one week, then go to normal dosing as prescribed. Learning About Low-Carbohydrate Foods  What foods are low in carbohydrate? The foods you eat contain nutrients, such as vitamins and minerals. Carbohydrate is a nutrient. Your body needs the right amount to stay healthy and work as it should. You can use the list below to help you make choices about which foods to eat. Some foods that are lower in carbohydrate include:  Dairy and dairy alternatives  · Cheese  · Cottage cheese  · Cream cheese  · Nut milk (unsweetened)  · Soy milk (unsweetened)  · Yogurt (Greek, plain)  Fruits  · Avocado  · [employer] and other protein foods  · Almonds  · Beef  · Chicken  · Cod  · Eggs  · Halibut  · Peanut butter and other nut butters  · Pistachios  · Pork  · Pumpkin seeds  · Tofu  · Trout  · [place]  · [place] ([facility])  · Walnuts  Vegetables  · Broccoli  · Carrots  · Cauliflower  · Green beans  · Mushrooms  · Peppers  · Salad greens  · Spinach  · Tomatoes  Work with your doctor to find out how much of this nutrient you need. Depending on your health, you may need more or less of it in your diet. Where can you learn more? Go to https://chpepiceweb.healthzanda. org and sign in to your [employer] account. Enter [phone] in the [facility] box to learn more about \"Learning About Low-Carbohydrate Foods. \"     If you do not have an account, please click on the \"Sign Up Now\" link. Current as of: December 17, 2020               Content Version: 12.9  © 1858-3081 Healthwise, [employer]. Care instructions adapted under license by [facility] ([facility]). If you have questions about a medical condition or this instruction, always ask your healthcare professional. [address] any warranty or liability for your use of this information. Learning About High Blood Sugar  What is high blood sugar? Your body turns the food you eat into glucose (sugar), which it uses for energy. But if your body isn't able to use the sugar right away, it can build up in your blood and lead to high blood sugar. When the amount of sugar in your blood stays too high for too much of the time, you may have diabetes. Diabetes is a disease that can cause serious health problems. The good news is that lifestyle changes may help you get your blood sugar back to normal and avoid or delay diabetes. What causes high blood sugar? Sugar (glucose) can build up in your blood if you:  · Have insulin resistance. · Don't take enough insulin or miss a dose of your diabetes medicine. · Take certain medicines, such as steroids. What are the symptoms? Having high blood sugar may not cause any symptoms at all. Or it may make you feel very thirsty or very hungry. You may also urinate more often than usual, have blurry vision, or lose weight without trying. How is high blood sugar treated? You can take steps to lower your blood sugar level if you understand what makes it get higher. Your doctor may want you to learn how to test your blood sugar level at home. Then you can see how illness, stress, or different kinds of food or medicine raise or lower your blood sugar level. Other tests may be needed to see if you have diabetes. How can you prevent high blood sugar? · Watch your weight. If you're overweight, losing just a small amount of weight may help. Reducing fat around your waist is most important. · Limit the amount of calories, sweets, and unhealthy fat you eat. Ask your doctor if a dietitian can help you. A registered dietitian can help you create meal plans that fit your lifestyle. · Get at least 30 minutes of exercise on most days of the week. Exercise helps control your blood sugar. It also helps you maintain a healthy weight. Walking is a good choice.  You also may want to do other activities, such as running, swimming, cycling, or playing tennis or team sports. · If your doctor prescribed medicines, take them exactly as prescribed. Call your doctor if you think you are having a problem with your medicine. You will get more details on the specific medicines your doctor prescribes. Follow-up care is a key part of your treatment and safety. Be sure to make and go to all appointments, and call your doctor if you are having problems. It's also a good idea to know your test results and keep a list of the medicines you take. Where can you learn more? Go to https://Treasure In The Sand Pizzeriapepiceweb.GemShare. org and sign in to your [employer] account. Enter O108 in the [employer] box to learn more about \"Learning About High Blood Sugar. \"     If you do not have an account, please click on the \"Sign Up Now\" link. Current as of: August 31, 2020               Content Version: 12.9  © 2006-2021 Well[employer]. Care instructions adapted under license by [employer] ([facility]). If you have questions about a medical condition or this instruction, always ask your healthcare professional. [address] any warranty or liability for your use of this information. Anxiety: How to Change Anxious Thoughts (03:01)  Your health professional recommends that you watch this short online health video. Practice recognizing and replacing thoughts that cause anxiety. Purpose:  Demonstrates basic cognitive-behavioral therapy technique. Goal:  The user will practice a technique to recognize and replace thoughts that cause anxiety. How to watch the video    Scan the QR code   OR Visit the website    https://i. se/r/Nti68ywuhouif   Current as of: September 23, 2020               Content Version: 12.9  © 2006-2021 Wello. Care instructions adapted under license by [facility] ([facility]).  If you have questions about a medical condition or this instruction, always ask your healthcare professional. [employer] disclaims any warranty or liability for your use of this information.

## 2021-08-05 ENCOUNTER — VIRTUAL VISIT (OUTPATIENT)
Dept: PSYCHOLOGY | Age: 51
End: 2021-08-05
Payer: COMMERCIAL

## 2021-08-05 DIAGNOSIS — F41.9 ANXIETY DISORDER, UNSPECIFIED TYPE: Primary | ICD-10-CM

## 2021-08-05 PROCEDURE — 90832 PSYTX W PT 30 MINUTES: CPT | Performed by: PSYCHOLOGIST

## 2021-08-05 NOTE — PROGRESS NOTES
Behavioral Health Consultation  Mariano Trevino Psy.D. Psychologist  8/5/2021      Time spent with Patient: 25 minutes  This is patient's third [facility] appointment. Reason for Consult:    Chief Complaint   Patient presents with    Anxiety     Referring Provider: Carey Ramsey PA-C  [facility] [facility]    Feedback given to PCP. TELEHEALTH VISIT -- Audio/Visual (During THYMP-11 public health emergency)  }  Pursuant to the emergency declaration under the [facility], [facility] waiver authority and the [employer] Act, this Virtual Visit was conducted, with patient's consent, to reduce the patient's risk of exposure to COVID-19 and provide continuity of care for an established patient. Services were provided through a video synchronous discussion virtually to substitute for in-person clinic visit. Pt gave verbal informed consent to participate in telehealth services. Conducted a risk-benefit analysis and determined that the patient's presenting problems are consistent with the use of telepsychology. Determined that the patient and/or guardian has sufficient knowledge and skills in the use of technology enabling them to adequately benefit from telepsychology. It was determined that this patient was able to be properly treated without an in-person session. Patient or guardian verified that they were currently located at the [facility] address that was provided during registration. Discussed consent form for telehealth and patient or guardian provided verbal consent.     Verified the following information:  Patient's identification: Yes  Patient location: [address] [place] [address]   Patient's call back number: [phone]   Patient's emergency contact's name and number, as well as permission to contact them if needed: Extended Emergency Contact Information  Primary Emergency Contact: Naeem Guerra  Address: [address] [facility], [address] [facility] of [address] Phone: [phone]  Relation: Spouse     Provider location: [place], [address] :  Hamzah Harrison reports her anxiety has worsened recently and she saw her PCP and has started Wellbutrin. Hamzah Harrison feels acute anxiety regarding upcoming transitions. She has been frequently tearful. She wonders if this could be due to menopause or perimenopause, as she experienced  depression and anxiety earlier in her life. She will call OBGYN today to discuss follow up of this concern. O:  MSE:  Appearance    alert, cooperative  Appetite normal  Sleep disturbance No  Fatigue No  Loss of pleasure No  Impulsive behavior No  Speech    normal rate and normal volume  Mood    Anxious   Affect    anxiety  Thought Content    intact  Thought Process    linear  Associations    logical connections  Insight    Good  Judgment    Intact  Orientation    oriented to person, place, time, and general circumstances  Memory    recent and remote memory intact  Attention/Concentration    intact  Morbid ideation No  Suicide Assessment    no suicidal ideation    A:  Hamzah Harrison returns for a follow up [facility] appointment regarding concerns related to anxiety. These symptoms have recently increased in response to external stressors. No safety concerns reported at this time. Diagnosis:  1. Anxiety disorder, unspecified type        Plan:  Pt interventions:  Discussed coping with upcoming week, PMR has been useful. Discussed use of temperature for mood management. Utilized CBT techniques to discuss upcoming transitions.

## 2021-08-06 DIAGNOSIS — N95.1 PERIMENOPAUSE: Primary | ICD-10-CM

## 2021-08-10 ENCOUNTER — OFFICE VISIT (OUTPATIENT)
Dept: PRIMARY CARE CLINIC | Age: 51
End: 2021-08-10
Payer: COMMERCIAL

## 2021-08-10 VITALS
WEIGHT: 168 LBS | DIASTOLIC BLOOD PRESSURE: 78 MMHG | OXYGEN SATURATION: 98 % | BODY MASS INDEX: 31.74 KG/M2 | HEART RATE: 90 BPM | SYSTOLIC BLOOD PRESSURE: 130 MMHG

## 2021-08-10 DIAGNOSIS — F41.9 ANXIETY DISORDER, UNSPECIFIED TYPE: Primary | ICD-10-CM

## 2021-08-10 DIAGNOSIS — N95.1 PERIMENOPAUSE: ICD-10-CM

## 2021-08-10 DIAGNOSIS — Z79.899 FOLLOW-UP ENCOUNTER INVOLVING MEDICATION: ICD-10-CM

## 2021-08-10 PROCEDURE — 99212 OFFICE O/P EST SF 10 MIN: CPT | Performed by: PHYSICIAN ASSISTANT

## 2021-08-10 ASSESSMENT — ENCOUNTER SYMPTOMS
DIARRHEA: 0
EYE DISCHARGE: 0
NAUSEA: 0
COUGH: 0
EYE REDNESS: 0
SINUS PAIN: 0
SORE THROAT: 0
SHORTNESS OF BREATH: 0
VOMITING: 0
CHEST TIGHTNESS: 0
CONSTIPATION: 0
RHINORRHEA: 0
ABDOMINAL PAIN: 0
SINUS PRESSURE: 0

## 2021-08-10 NOTE — PATIENT INSTRUCTIONS

## 2021-08-10 NOTE — PROGRESS NOTES
8/10/2021     Juan Du (:  1970) is a 45 y.o. female, here for evaluation of the following medical concerns:    Chief Complaint   Patient presents with    Follow-up        Plan from last visit on 8/3/21:    \"We discussed increasing the beta-blocker dosage versus starting Wellbutrin or other medication to help with anxiety and depression patient advised she is willing to start Wellbutrin. Discussed dosing. With her gradual taper on dosing. We will follow up with patient in 2 weeks time to make sure that she is being compliant and has no further needs. Also checked patient's A1c as patient felt like she was having episodes of having low blood sugar. Provided patient with a over-the-counter glucose monitor and discussed healthy habits to help keep her insulin level more steady. \" A1C was found to be 6.2 at last.    Reports feeling better than at last visit. Has been taking the Wellbutrin. Reports side loss of appetite, \"hazy\", and smelling items that aren't present. She states that the side effects are tolerable. Returned to work today and had some stress related to that. States that she believes she will be fine once things get started but not excited about the professional development. Daughter's moving weekend is coming up this weekend. Not looking forward to the trip. The anticipation is difficult to manage but they are packing and moving forward. Did to pick-up her OTC glucose monitor. Has been working on walking. Reports not eating much. Intermittent eating habits. Review of Systems   Constitutional: Negative for chills and fever. HENT: Negative. Negative for congestion, rhinorrhea, sinus pressure, sinus pain and sore throat. Eyes: Negative for discharge, redness and visual disturbance. Respiratory: Negative for cough, chest tightness and shortness of breath. Cardiovascular: Negative for chest pain and palpitations.    Gastrointestinal: Negative for abdominal Vitals:    08/10/21 1505   BP: 130/78   Pulse: 90   SpO2: 98%   Weight: 168 lb (76.2 kg)     Estimated body mass index is 31.74 kg/m² as calculated from the following:    Height as of 6/8/21: 5' 1\" (1.549 m). Weight as of this encounter: 168 lb (76.2 kg). Physical Exam  Vitals and nursing note reviewed. Constitutional:       Appearance: She is well-developed. She is not diaphoretic. HENT:      Head: Normocephalic. Nose: Nose normal.      Mouth/Throat:      Pharynx: No oropharyngeal exudate. Eyes:      General:         Right eye: No discharge. Left eye: No discharge. Conjunctiva/sclera: Conjunctivae normal.      Pupils: Pupils are equal, round, and reactive to light. Cardiovascular:      Rate and Rhythm: Normal rate and regular rhythm. Heart sounds: Normal heart sounds. No murmur heard. No friction rub. No gallop. Pulmonary:      Effort: Pulmonary effort is normal. No respiratory distress. Breath sounds: Normal breath sounds. No wheezing. Abdominal:      General: Bowel sounds are normal. There is no distension. Palpations: Abdomen is soft. Tenderness: There is no abdominal tenderness. Musculoskeletal:         General: Normal range of motion. Cervical back: Normal range of motion. Skin:     General: Skin is warm and dry. Neurological:      General: No focal deficit present. Mental Status: She is alert and oriented to person, place, and time. Psychiatric:         Mood and Affect: Mood normal.         Behavior: Behavior normal.         Thought Content: Thought content normal.         Judgment: Judgment normal.         ASSESSMENT/PLAN:   1. Perimenopause  - TSH with Reflex  - FOLLICLE STIMULATING HORMONE  - ESTROGENS, FRACTIONATED    2. Anxiety disorder, unspecified type      3. Follow-up encounter involving medication        Follow up PRN/ Keep follow up on Monday with Dr. Lady Cuevas.    An electronic signature was used to authenticate this note.          --Brooksie Boxer, PA-C on 8/10/2021 at 3:56 PM

## 2021-08-11 LAB
FOLLICLE STIMULATING HORMONE: 3.4 MIU/ML
TSH REFLEX: 1.01 UIU/ML (ref 0.27–4.2)

## 2021-08-12 RX ORDER — ONDANSETRON 4 MG/1
4 TABLET, ORALLY DISINTEGRATING ORAL EVERY 8 HOURS PRN
Qty: 20 TABLET | Refills: 0 | Status: SHIPPED | OUTPATIENT
Start: 2021-08-12 | End: 2021-10-15

## 2021-08-16 ENCOUNTER — VIRTUAL VISIT (OUTPATIENT)
Dept: PSYCHOLOGY | Age: 51
End: 2021-08-16
Payer: COMMERCIAL

## 2021-08-16 DIAGNOSIS — F41.9 ANXIETY DISORDER, UNSPECIFIED TYPE: Primary | ICD-10-CM

## 2021-08-16 LAB
ESTRADIOL LEVEL: 276.3 PG/ML
ESTROGEN TOTAL: 396.8 PG/ML
ESTRONE: 120.5 PG/ML

## 2021-08-16 PROCEDURE — 90832 PSYTX W PT 30 MINUTES: CPT | Performed by: PSYCHOLOGIST

## 2021-08-16 NOTE — PROGRESS NOTES
Behavioral Health Consultation  Arsen Plascencia Psy.D. Psychologist  8/16/2021      Time spent with Patient: 25 minutes  This is patient's fourth [facility] appointment. Reason for Consult:    Chief Complaint   Patient presents with    Anxiety     Referring Provider: Aguilar Chung MD  [address] [facility] [address] [facility],  [address]    Feedback given to PCP. TELEHEALTH VISIT -- Audio/Visual (During PVYUB-12 public health emergency)  }  Pursuant to the emergency declaration under the [facility], [facility] waiver authority and the [employer] Act, this Virtual Visit was conducted, with patient's consent, to reduce the patient's risk of exposure to COVID-19 and provide continuity of care for an established patient. Services were provided through a video synchronous discussion virtually to substitute for in-person clinic visit. Pt gave verbal informed consent to participate in telehealth services. Conducted a risk-benefit analysis and determined that the patient's presenting problems are consistent with the use of telepsychology. Determined that the patient and/or guardian has sufficient knowledge and skills in the use of technology enabling them to adequately benefit from telepsychology. It was determined that this patient was able to be properly treated without an in-person session. Patient or guardian verified that they were currently located at the [facility] address that was provided during registration. Discussed consent form for telehealth and patient or guardian provided verbal consent.     Verified the following information:  Patient's identification: Yes  Patient location: [address] [place] [address]   Patient's call back number: [phone]   Patient's emergency contact's name and number, as well as permission to contact them if needed: Extended Emergency Contact Information  Primary Emergency Contact: Naeem Guerra  Address: [address] [place] [address], [address] [facility] of [address] Phone: [phone]  Relation: Spouse     Provider location: [place], [address] [place] [address] St:  Maurice Blake reports that her anxiety has worsened recently. She had difficulty  from her daughter at college drop off and continues to worry about her daughter's transition to college. This has coincided with Poppy Holland return to school as well. O:  MSE:  Appearance    alert, cooperative  Appetite decreased  Sleep disturbance Yes  Fatigue Yes  Loss of pleasure some  Impulsive behavior No  Speech    normal rate and normal volume  Mood    Depressed   Affect    depressed affect  Thought Content    intact  Thought Process    linear  Associations    logical connections  Insight    Good  Judgment    Intact  Orientation    oriented to person, place, time, and general circumstances  Memory    recent and remote memory intact  Attention/Concentration    intact  Morbid ideation No  Suicide Assessment    no suicidal ideation    A:  Maurice Blake returns for a follow up [facility] appointment regarding concerns related to anxiety. These symptoms are worsening, likely in response to environmental stressors. No safety concerns reported at this time. Diagnosis:  1. Anxiety disorder, unspecified type        Plan:  Pt interventions:  Reviewed the role that cognitions, particularly distorted thoughts play in anxiety and mood concerns. Will continue this conversation in next appointment.

## 2021-08-17 ENCOUNTER — NURSE ONLY (OUTPATIENT)
Dept: PRIMARY CARE CLINIC | Age: 51
End: 2021-08-17

## 2021-08-30 ENCOUNTER — VIRTUAL VISIT (OUTPATIENT)
Dept: PSYCHOLOGY | Age: 51
End: 2021-08-30
Payer: COMMERCIAL

## 2021-08-30 DIAGNOSIS — F41.9 ANXIETY DISORDER, UNSPECIFIED TYPE: Primary | ICD-10-CM

## 2021-08-30 PROCEDURE — 90832 PSYTX W PT 30 MINUTES: CPT | Performed by: PSYCHOLOGIST

## 2021-08-30 NOTE — PROGRESS NOTES
Behavioral Health Consultation  Sergio Lopez Psy.D. Psychologist  8/30/2021      Time spent with Patient: 25 minutes  This is patient's fifth [facility] appointment. Reason for Consult:    Chief Complaint   Patient presents with    Anxiety     Referring Provider: Funmilayo Forrester PA-C  [facility]    Feedback given to PCP. TELEHEALTH VISIT -- Audio/Visual (During GSCEY-57 public health emergency)  }  Pursuant to the emergency declaration under the [facility], [facility] waiver authority and the [employer] Act, this Virtual Visit was conducted, with patient's consent, to reduce the patient's risk of exposure to COVID-19 and provide continuity of care for an established patient. Services were provided through a video synchronous discussion virtually to substitute for in-person clinic visit. Pt gave verbal informed consent to participate in telehealth services. Conducted a risk-benefit analysis and determined that the patient's presenting problems are consistent with the use of telepsychology. Determined that the patient and/or guardian has sufficient knowledge and skills in the use of technology enabling them to adequately benefit from telepsychology. It was determined that this patient was able to be properly treated without an in-person session. Patient or guardian verified that they were currently located at the [facility] address that was provided during registration. Discussed consent form for telehealth and patient or guardian provided verbal consent.     Verified the following information:  Patient's identification: Yes  Patient location: [address] [place] [address]   Patient's call back number: [phone]   Patient's emergency contact's name and number, as well as permission to contact them if needed: Extended Emergency Contact Information  Primary Emergency Contact: Naeem Guerra  Address: [address] [facility], [address] [facility] of [address] Phone: [phone]  Relation: Spouse     Provider location: [place], [address] [place] [address] St:  Gabe Resendez reports feeling that the obsessive thoughts have calmed down and her anxiety has improved somewhat. Her mood is more optimistic and her appetite has returned. She continues to be very involved in her daughter's life but feels that the boundary established is appropriate, she enjoys being able to provide support from [facility]. Angeline Moya sleep has returned to normal and she has started to adjust her routine in an effort to reintroduce exercise. O:  MSE:  Appearance    alert, cooperative  Appetite normal  Sleep disturbance No  Fatigue No  Loss of pleasure No  Impulsive behavior No  Speech    normal rate and normal volume  Mood    euthymic   Affect    normal affect  Thought Content    intact  Thought Process    linear  Associations    logical connections  Insight    Good  Judgment    Intact  Orientation    oriented to person, place, time, and general circumstances  Memory    recent and remote memory intact  Attention/Concentration    intact  Morbid ideation No  Suicide Assessment    no suicidal ideation    A:  Gabe Resendez returns for a follow up [facility] appointment regarding concerns related to mood and anxiety. These symptoms are improving. No safety concerns reported at this time. Diagnosis:  1. Anxiety disorder, unspecified type        Plan:  Pt interventions:  Discussed boundaries and behavioral goals for the next several weeks. Provided praise for efforts. Reviewed use of cognitive distortions.

## 2021-09-14 ENCOUNTER — OFFICE VISIT (OUTPATIENT)
Dept: PRIMARY CARE CLINIC | Age: 51
End: 2021-09-14
Payer: COMMERCIAL

## 2021-09-14 VITALS
SYSTOLIC BLOOD PRESSURE: 122 MMHG | OXYGEN SATURATION: 98 % | TEMPERATURE: 97.8 F | WEIGHT: 170 LBS | BODY MASS INDEX: 32.12 KG/M2 | HEART RATE: 78 BPM | DIASTOLIC BLOOD PRESSURE: 62 MMHG

## 2021-09-14 DIAGNOSIS — M79.605 PAIN IN BOTH LOWER EXTREMITIES: Primary | ICD-10-CM

## 2021-09-14 DIAGNOSIS — F41.8 DEPRESSION WITH ANXIETY: ICD-10-CM

## 2021-09-14 DIAGNOSIS — M79.604 PAIN IN BOTH LOWER EXTREMITIES: Primary | ICD-10-CM

## 2021-09-14 PROCEDURE — 99213 OFFICE O/P EST LOW 20 MIN: CPT | Performed by: PHYSICIAN ASSISTANT

## 2021-09-14 RX ORDER — BUPROPION HYDROCHLORIDE 100 MG/1
1 TABLET, EXTENDED RELEASE ORAL 2 TIMES DAILY
COMMUNITY
Start: 2021-03-04 | End: 2022-01-12 | Stop reason: SINTOL

## 2021-09-14 ASSESSMENT — ENCOUNTER SYMPTOMS
COLOR CHANGE: 0
ABDOMINAL PAIN: 0
NAUSEA: 0
SHORTNESS OF BREATH: 0
DIARRHEA: 0
VOMITING: 0
CHEST TIGHTNESS: 0
COUGH: 0

## 2021-09-14 NOTE — PATIENT INSTRUCTIONS
Patient Education         Proper Sitting and Lifting for a Healthy Back (01:01)  Your health professional recommends that you watch this short online health video. Learn how to sit and lift correctly to keep your back healthy. Purpose:  Illustrates how to sit and lift correctly to protect the back. Discusses proper ergonomics for sitting. Goal:  The user will learn how to sit and lift correctly to protect the back. How to watch the video    Scan the QR code   OR Visit the website    https://hwi. [email or web address]   Current as of: November 16, 2020               Content Version: 12.9  © 2006-2021 [employer]. Care instructions adapted under license by [facility] ([facility]). If you have questions about a medical condition or this instruction, always ask your healthcare professional. [place] [address] any warranty or liability for your use of this information. Musculoskeletal Pain: Care Instructions  Your Care Instructions     Different problems with the bones, muscles, nerves, ligaments, and tendons in the body can cause pain. One or more areas of your body may ache or burn. Or they may feel tired, stiff, or sore. The medical term for this type of pain is musculoskeletal pain. It can have many different causes. Sometimes the pain is caused by an injury such as a strain or sprain. Or you might have pain from using one part of your body in the same way over and over again. This is called overuse. In some cases, the cause of the pain is another health problem such as arthritis or fibromyalgia. The doctor will examine you and ask you questions about your health to help find the cause of your pain. Blood tests or imaging tests like an X-ray may also be helpful. But sometimes doctors can't find a cause of the pain. Treatment depends on your symptoms and the cause of the pain, if known. The doctor has checked you carefully, but problems can develop later.  If you notice any problems or new symptoms, get medical treatment right away. Follow-up care is a key part of your treatment and safety. Be sure to make and go to all appointments, and call your doctor if you are having problems. It's also a good idea to know your test results and keep a list of the medicines you take. How can you care for yourself at home? · If you have new pain:  ? Rest until you feel better. ? Do not do anything that makes the pain worse. Return to exercise gradually if you feel better and your doctor says it's okay. · Be safe with medicines. Read and follow all instructions on the label. ? If the doctor gave you a prescription medicine for pain, take it as prescribed. ? If you are not taking a prescription pain medicine, ask your doctor if you can take an over-the-counter medicine. · Put heat or cold where your pain is, as needed. Use what helps you most. You can also switch between hot and cold packs. ? Apply heat 2 or 3 times a day for 20 to 30 minutes. This can be done using a heating pad, hot shower, or hot pack to relieve pain and stiffness. But don't use heat on a newly swollen joint. ? Put ice or a cold pack on the painful spot for 10 to 20 minutes at a time. Put a thin cloth between the ice and your skin. When should you call for help? Call your doctor now or seek immediate medical care if:    · You have new pain, or your pain gets worse.     · You have new symptoms such as a fever, a rash, or chills. Watch closely for changes in your health, and be sure to contact your doctor if:    · You do not get better as expected. Where can you learn more? Go to https://BNI Videokiriteb.[employer]inte. org and sign in to your [employer] account. Enter T329 in the RaySat box to learn more about \"Musculoskeletal Pain: Care Instructions. \"     If you do not have an account, please click on the \"Sign Up Now\" link.   Current as of: August 4, 2020               Content Version: 12.9  © 9578-7822 Healthwise, Incorporated. Care instructions adapted under license by [facility] ([facility]). If you have questions about a medical condition or this instruction, always ask your healthcare professional. [address] any warranty or liability for your use of this information.

## 2021-09-14 NOTE — PROGRESS NOTES
2021    Marie Momin (:  1970) is a 45 y.o. female, here for evaluation of the following medical concerns:     Chief Complaint   Patient presents with    Leg Pain     both legs        HPI Patient presents today for bilateral leg pain that's been occurring 5+ years. Pain is described as achey and crampy located from the hips to knees and behind the knees. Left leg hurts worse than R leg. She explains she was visiting her daughter over the weekend on a college campus and that she was walking a lot more than usual throughout the entire weekend. The pain worsened after that and on the car ride home. Has not taken anything for the pain. Also endorses leg pain with rest and sitting for too long. Pt is a teacher at [employer] and explains that she is sitting for most of the day due to her job. Currently seeing orthopedics for bilateral IT band bursitis. Pt reports this pain is different than hip pain. Has seen vascular in the past for similar pain. DVT ruled out with U/S at the time. Denies edema, erythema, severe pain. No unintentional weight loss, fevers, chills, night sweats. Pts anxiety and depression, under much better control. Side effect of nausea from wellbutrin now improved, she feels it is helpful. Can have a conversation about her daughter without crying, which is an improvement. Denies thoughts of self harm. Review of Systems   Constitutional: Negative for chills, fatigue, fever and unexpected weight change. HENT: Negative. Respiratory: Negative for cough, chest tightness and shortness of breath. Cardiovascular: Negative for chest pain. Gastrointestinal: Negative for abdominal pain, diarrhea, nausea and vomiting. Genitourinary: Negative. Musculoskeletal: Positive for myalgias. Negative for arthralgias, gait problem and joint swelling. Bilateral LE pain. Skin: Negative for color change, pallor and rash. Neurological: Negative.   Negative for weakness and numbness. Hematological: Negative. Psychiatric/Behavioral: Positive for dysphoric mood. All other systems reviewed and are negative. Prior to Visit Medications    Medication Sig Taking? Authorizing Provider   buPROPion (WELLBUTRIN SR) 100 MG extended release tablet Take 1 tablet by mouth 2 times daily Yes Historical Provider, MD   amLODIPine (NORVASC) 5 MG tablet TAKE ONE TABLET BY MOUTH DAILY Yes Sindy Buckley PA-C   esomeprazole (NEXIUM) 20 MG delayed release capsule Take 20 mg by mouth every morning (before breakfast) Yes Historical Provider, MD   metoprolol tartrate (LOPRESSOR) 25 MG tablet Take 1 tablet by mouth 2 times daily Yes Homero Scale, MD   aspirin 81 MG EC tablet Take 81 mg by mouth daily Yes Historical Provider, MD   valACYclovir (VALTREX) 500 MG tablet Take 500 mg by mouth See Admin Instructions Yes Historical Provider, MD   esomeprazole (NEXIUM) 20 MG delayed release capsule Take 1 capsule by mouth daily TAKE ONE CAPSULE BY MOUTH DAILY Yes THAO Holt CNP   atorvastatin (LIPITOR) 10 MG tablet Take 1 tablet by mouth daily Yes THAO Holt CNP   Cholecalciferol (VITAMIN D3) 5000 units TABS Take by mouth Yes Historical Provider, MD   Cyanocobalamin (VITAMIN B-12 PO) Take 1,200 mg by mouth daily Yes Historical Provider, MD   ondansetron (ZOFRAN ODT) 4 MG disintegrating tablet Take 1 tablet by mouth every 8 hours as needed for Nausea  Patient not taking: Reported on 9/14/2021  Sindy Buckley PA-C   hydrOXYzine (ATARAX) 50 MG tablet Take 1 tablet by mouth 3 times daily as needed for Anxiety May sub Vistaril.   Patient not taking: Reported on 9/14/2021  Sindy Buckley PA-C   buPROPion [facility]) 100 MG extended release tablet Take 1 tablet by mouth 2 times daily  Patient not taking: Reported on 4/2/2021  Sindy Buckley PA-C   ondansetron (ZOFRAN) 4 MG tablet Take 1 tablet by mouth 3 times daily as needed for Nausea or Vomiting  Patient not taking: Reported on 9/14/2021  THAO Sanchez CNP        Allergies   Allergen Reactions    Erythromycin        Past Medical History:   Diagnosis Date    Anxiety     Diabetes ([facility] [address]     GERD (gastroesophageal reflux disease)     Hyperlipidemia     Hypertension     Melanoma ([facility]        Past Surgical History:   Procedure Laterality Date    HYSTERECTOMY, TOTAL ABDOMINAL      Has ovaries    TONSILLECTOMY         Social History     Socioeconomic History    Marital status:      Spouse name: Not on file    Number of children: Not on file    Years of education: Not on file    Highest education level: Not on file   Occupational History    Not on file   Tobacco Use    Smoking status: Never Smoker    Smokeless tobacco: Never Used   Vaping Use    Vaping Use: Never used   Substance and Sexual Activity    Alcohol use: Yes    Drug use: No    Sexual activity: Not on file   Other Topics Concern    Not on file   Social History Narrative    Not on file     Social Determinants of Health     Financial Resource Strain:     Difficulty of Paying Living Expenses:    Food Insecurity:     Worried About Running Out of Food in the Last Year:     [address] in the Last Year:    Transportation Needs:     Lack of Transportation (Medical):      Lack of Transportation (Non-Medical):    Physical Activity:     Days of Exercise per Week:     Minutes of Exercise per Session:    Stress:     Feeling of Stress :    Social Connections:     Frequency of Communication with Friends and Family:     Frequency of Social Gatherings with Friends and Family:     Attends Restorationist Services:     Active Member of Clubs or Organizations:     Attends Club or Organization Meetings:     Marital Status:    Intimate Partner Violence:     Fear of Current or Ex-Partner:     Emotionally Abused:     Physically Abused:     Sexually Abused:         Family History   Problem Relation Age of Onset    Heart Disease Father     High Blood Pressure Father     Macular Degen Mother     Diabetes Mother     High Cholesterol Mother     High Cholesterol Brother     Diabetes Maternal Grandmother     Heart Disease Maternal Grandfather     Cancer Paternal Grandmother     Heart Disease Paternal Grandfather        Vitals:    09/14/21 1508   BP: 122/62   Pulse: 78   Temp: 97.8 °F (36.6 °C)   TempSrc: Oral   SpO2: 98%   Weight: 170 lb (77.1 kg)     Estimated body mass index is 32.12 kg/m² as calculated from the following:    Height as of 6/8/21: 5' 1\" (1.549 m). Weight as of this encounter: 170 lb (77.1 kg). Physical Exam  Vitals and nursing note reviewed. Constitutional:       Appearance: She is well-developed. HENT:      Head: Normocephalic and atraumatic. Eyes:      General:         Right eye: No discharge. Left eye: No discharge. Cardiovascular:      Rate and Rhythm: Normal rate and regular rhythm. Pulmonary:      Effort: Pulmonary effort is normal. No respiratory distress. Musculoskeletal:         General: Normal range of motion. Cervical back: Normal range of motion and neck supple. Right lower leg: Normal. No swelling. No edema. Left lower leg: No swelling. 1+ Pitting Edema present. Skin:     General: Skin is warm and dry. Capillary Refill: Capillary refill takes less than 2 seconds. Coloration: Skin is not pale. Findings: No bruising, erythema or rash. Neurological:      Mental Status: She is alert and oriented to person, place, and time. Psychiatric:         Mood and Affect: Mood normal.         Behavior: Behavior normal.         Thought Content: Thought content normal.         Judgment: Judgment normal.      Comments: Makes appropriate eye contact. Normal affect. Normal thought content. ASSESSMENT/PLAN: Non-toxic, NAD, cooperative pt with bilateral LE pain from hips to knees.  Pain described as achey with ambulation and rest. Also being treated for IT band bursitis. Differential includes: pain related to bursitis, arthritis, vasculitis, possible claudication (although no h/o of PVD). Likely musculoskeletal, encouraged activity and NSAIDs, OTC. Mild left sided pedal edema, which is chronic, intermittent. Not needing diuretic anymore. Evaluated blood work in march. Normal kidney fxn, no evidence of CHF. Heart palpitations improved with metoprolol. Doing well overall. 1. Pain in both lower extremities  - Rosa Marshall MD, Vascular Surgery, [facility]  - Instructed pt to continue with NSAIDs as needed for pain    2. Depression with Anxiety  - Continue wellbutrin  - Continue to follow with Dr Tamez Console    Return if symptoms worsen or fail to improve. An  electronic signature was used to authenticate this note.          --Sindy Buckley PA-C on 9/14/2021 at 4:08 PM

## 2021-09-17 ENCOUNTER — TELEPHONE (OUTPATIENT)
Dept: VASCULAR SURGERY | Age: 51
End: 2021-09-17

## 2021-09-17 NOTE — TELEPHONE ENCOUNTER
Called patient regarding scheduling apt and arterial duplex if has not had one. Left her her a message.  rodo

## 2021-09-20 ENCOUNTER — VIRTUAL VISIT (OUTPATIENT)
Dept: PSYCHOLOGY | Age: 51
End: 2021-09-20
Payer: COMMERCIAL

## 2021-09-20 DIAGNOSIS — F41.9 ANXIETY DISORDER, UNSPECIFIED TYPE: Primary | ICD-10-CM

## 2021-09-20 PROCEDURE — 90832 PSYTX W PT 30 MINUTES: CPT | Performed by: PSYCHOLOGIST

## 2021-09-20 NOTE — PROGRESS NOTES
Behavioral Health Consultation  Gualberto Ag Psy.D. Psychologist  9/20/2021      Time spent with Patient: 25 minutes  This is patient's sixth [facility] appointment. Reason for Consult:    Chief Complaint   Patient presents with    Anxiety     Referring Provider: Mayela Gutierrez PA-C  6 [facility]    Feedback given to PCP. TELEHEALTH VISIT -- Audio/Visual (During WFSEV-67 public health emergency)  }  Pursuant to the emergency declaration under the [facility] waiver authority and the [employer] Act, this Virtual Visit was conducted, with patient's consent, to reduce the patient's risk of exposure to COVID-19 and provide continuity of care for an established patient. Services were provided through a video synchronous discussion virtually to substitute for in-person clinic visit. Pt gave verbal informed consent to participate in telehealth services. Conducted a risk-benefit analysis and determined that the patient's presenting problems are consistent with the use of telepsychology. Determined that the patient and/or guardian has sufficient knowledge and skills in the use of technology enabling them to adequately benefit from telepsychology. It was determined that this patient was able to be properly treated without an in-person session. Patient or guardian verified that they were currently located at the [facility] address that was provided during registration. Discussed consent form for telehealth and patient or guardian provided verbal consent.     Verified the following information:  Patient's identification: Yes  Patient location: [address] [place] [address]   Patient's call back number: [phone]   Patient's emergency contact's name and number, as well as permission to contact them if needed: Extended Emergency Contact Information  Primary Emergency Contact: Naeem Guerra  Address: [address]           [place], [address] [facility] of [address] Phone: [phone]  Relation: Spouse     Provider location: [place], [facility] :  Maurice Blake reports that she has been busy with work and moving. She will be moving on the 5th. Work is going well. Things have improved with her daughter, Maurice Blake is setting good boundaries while remaining supportive. She is engaging socially a bit more at work and sleeping well. O:  MSE:  Appearance    alert, cooperative  Appetite normal  Sleep disturbance No  Fatigue No  Loss of pleasure No  Impulsive behavior No  Speech    normal rate and normal volume  Mood    euthymic   Affect    normal affect  Thought Content    intact  Thought Process    linear  Associations    logical connections  Insight    Good  Judgment    Intact  Orientation    oriented to person, place, time, and general circumstances  Memory    recent and remote memory intact  Attention/Concentration    intact  Morbid ideation No  Suicide Assessment    no suicidal ideation    A:  Maurice Blake returns for a follow up [facility] appointment regarding concerns related to anxiety. These symptoms are improving. No safety concerns reported at this time. Diagnosis:  1. Anxiety disorder, unspecified type        Plan:  Pt interventions:  Provided praise for effective coping and emotional regulation. Provided praise for parenting strategies. Discussed strategies for maintenance. Agreed that [address] daughter can join next appointment (she will be in town) if discussing communication would be useful.

## 2021-09-22 RX ORDER — ATORVASTATIN CALCIUM 10 MG/1
10 TABLET, FILM COATED ORAL DAILY
Qty: 90 TABLET | Refills: 3 | Status: SHIPPED | OUTPATIENT
Start: 2021-09-22 | End: 2021-10-06 | Stop reason: SDUPTHER

## 2021-10-11 ENCOUNTER — VIRTUAL VISIT (OUTPATIENT)
Dept: PSYCHOLOGY | Age: 51
End: 2021-10-11
Payer: COMMERCIAL

## 2021-10-11 DIAGNOSIS — F41.9 ANXIETY DISORDER, UNSPECIFIED TYPE: Primary | ICD-10-CM

## 2021-10-11 PROCEDURE — 90832 PSYTX W PT 30 MINUTES: CPT | Performed by: PSYCHOLOGIST

## 2021-10-11 RX ORDER — ATORVASTATIN CALCIUM 10 MG/1
10 TABLET, FILM COATED ORAL DAILY
Qty: 90 TABLET | Refills: 3 | Status: SHIPPED | OUTPATIENT
Start: 2021-10-11 | End: 2022-10-04 | Stop reason: SDUPTHER

## 2021-10-11 NOTE — PROGRESS NOTES
Behavioral Health Consultation  Babita Khan Psy.D. Psychologist  10/11/2021      Time spent with Patient: 25 minutes  This is patient's seventh [facility] appointment. Reason for Consult:    Chief Complaint   Patient presents with    Anxiety     Referring Provider: Fanny Brantley PA-C  6 [facility]    Feedback given to PCP. TELEHEALTH VISIT -- Audio/Visual (During FTBYJ-18 public health emergency)  }  Pursuant to the emergency declaration under the [facility], Count includes the [facility] waiver authority and the [employer] Act, this Virtual Visit was conducted, with patient's consent, to reduce the patient's risk of exposure to COVID-19 and provide continuity of care for an established patient. Services were provided through a video synchronous discussion virtually to substitute for in-person clinic visit. Pt gave verbal informed consent to participate in telehealth services. Conducted a risk-benefit analysis and determined that the patient's presenting problems are consistent with the use of telepsychology. Determined that the patient and/or guardian has sufficient knowledge and skills in the use of technology enabling them to adequately benefit from telepsychology. It was determined that this patient was able to be properly treated without an in-person session. Patient or guardian verified that they were currently located at the [facility] address that was provided during registration. Discussed consent form for telehealth and patient or guardian provided verbal consent.     Verified the following information:  Patient's identification: Yes  Patient location: [address] [place] [address]   Patient's call back number: [phone]   Patient's emergency contact's name and number, as well as permission to contact them if needed: Extended Emergency Contact Information  Primary Emergency Contact: Naeem Guerra  Address: [address] [place] [address], [address] Phone: [phone]  Relation: Spouse     Provider location: [place], [address] daughter joins appointment. They both report that their relationship is doing well given the adjustments to college, although daughter is still struggling with homesickness. Paula Median daughter provides feedback on what has been particularly helpful that Mary Jo Kc is doing in supporting her. O:  MSE:  Appearance    alert, cooperative  Appetite normal  Sleep disturbance No  Fatigue No  Loss of pleasure No  Impulsive behavior No  Speech    normal rate and normal volume  Mood    euthymic   Affect    normal affect  Thought Content    intact  Thought Process    linear  Associations    logical connections  Insight    Good  Judgment    Intact  Orientation    oriented to person, place, time, and general circumstances  Memory    recent and remote memory intact  Attention/Concentration    intact  Morbid ideation No  Suicide Assessment    no suicidal ideation    A:  MaryJ o Kc returns for a follow up [facility] appointment regarding concerns related to mood and anxiety. These symptoms are improving. No safety concerns reported at this time. Diagnosis:  1. Anxiety disorder, unspecified type        Plan:  Pt interventions:  Discussed what behaviors to continue in an effort to transition relationship to adult child/mother, provided praise for good efforts thus far. Discussed daughter getting support for herself at college. Discussed strategies for managing anxiety tonight as preparing to return.

## 2021-10-15 ENCOUNTER — OFFICE VISIT (OUTPATIENT)
Dept: VASCULAR SURGERY | Age: 51
End: 2021-10-15
Payer: COMMERCIAL

## 2021-10-15 VITALS
WEIGHT: 162 LBS | BODY MASS INDEX: 30.58 KG/M2 | HEIGHT: 61 IN | DIASTOLIC BLOOD PRESSURE: 72 MMHG | SYSTOLIC BLOOD PRESSURE: 132 MMHG

## 2021-10-15 DIAGNOSIS — M79.89 LEG SWELLING: Primary | ICD-10-CM

## 2021-10-15 PROCEDURE — 99203 OFFICE O/P NEW LOW 30 MIN: CPT | Performed by: SURGERY

## 2021-10-16 NOTE — PROGRESS NOTES
Outpatient Consultation / H&P    Date of Consultation:  10/15/2021    PCP:  Winsome Velasco PA-C     Referring Provider:  Winsome Velasco     Chief Complaint:   Chief Complaint   Patient presents with   Autumn Enriquezlayton patient here for left leg swelling and painful. rodo        History of Present Illness: We are asked to see this patient in consultation by Winsome Velasco regarding bilateral leg swelling, left worse than right. Jami Calix is a 45 y.o. female who has a history of prior wide local excision of a left anterior thigh melanoma. She did not have a lymph node dissection associated with this. She complains of bilateral lower extremity swelling, worse at end of day. She does have a compression \"sleve\" which she wears occasionally on the left. She has had no skin changes or ulcerations. No venous testing has been performed. Past Medical History:  Past Medical History:   Diagnosis Date    Anxiety     Diabetes ([facility]     GERD (gastroesophageal reflux disease)     Hyperlipidemia     Hypertension     [facility]        Past Surgical History:  Past Surgical History:   Procedure Laterality Date    HYSTERECTOMY, TOTAL ABDOMINAL      Has ovaries    TONSILLECTOMY         Home Medications:   Prior to Admission medications    Medication Sig Start Date End Date Taking?  Authorizing Provider   atorvastatin (LIPITOR) 10 MG tablet Take 1 tablet by mouth daily 10/11/21  Yes Sindy Buckley PA-C   buPROPion (WELLBUTRIN SR) 100 MG extended release tablet Take 1 tablet by mouth 2 times daily 9/22/21 10/22/21 Yes Sindy Buckley PA-C   buPROPion (WELLBUTRIN SR) 100 MG extended release tablet Take 1 tablet by mouth 2 times daily 3/4/21  Yes Historical Provider, MD   amLODIPine (NORVASC) 5 MG tablet TAKE ONE TABLET BY MOUTH DAILY 5/6/21  Yes Sindy Buckley PA-C   esomeprazole (NEXIUM) 20 MG delayed release capsule Take 20 mg by mouth every morning (before breakfast)   Yes Historical Provider, MD metoprolol tartrate (LOPRESSOR) 25 MG tablet Take 1 tablet by mouth 2 times daily 4/2/21  Yes Tyler Guerra MD   aspirin 81 MG EC tablet Take 81 mg by mouth daily   Yes Historical Provider, MD   valACYclovir (VALTREX) 500 MG tablet Take 500 mg by mouth See Admin Instructions 2/4/21  Yes Historical Provider, MD   esomeprazole ([address]) 20 MG delayed release capsule Take 1 capsule by mouth daily TAKE ONE CAPSULE BY MOUTH DAILY 12/31/20  Yes Jayne Britt, APRN - CNP   Cholecalciferol (VITAMIN D3) 5000 units TABS Take by mouth   Yes Historical Provider, MD   Cyanocobalamin (VITAMIN B-12 PO) Take 1,200 mg by mouth daily   Yes Historical Provider, MD        Allergies:  Erythromycin      Social History:      Social History     Socioeconomic History    Marital status:      Spouse name: Not on file    Number of children: Not on file    Years of education: Not on file    Highest education level: Not on file   Occupational History    Not on file   Tobacco Use    Smoking status: Never Smoker    Smokeless tobacco: Never Used   Vaping Use    Vaping Use: Never used   Substance and Sexual Activity    Alcohol use: Yes    Drug use: No    Sexual activity: Not on file   Other Topics Concern    Not on file   Social History Narrative    Not on file     Social Determinants of Health     Financial Resource Strain:     Difficulty of Paying Living Expenses:    Food Insecurity:     Worried About Running Out of Food in the Last Year:     [address] in the Last Year:    Transportation Needs:     Lack of Transportation (Medical):      Lack of Transportation (Non-Medical):    Physical Activity:     Days of Exercise per Week:     Minutes of Exercise per Session:    Stress:     Feeling of Stress :    Social Connections:     Frequency of Communication with Friends and Family:     Frequency of Social Gatherings with Friends and Family:     Attends Nondenominational Services:     Active Member of Clubs or Organizations:     Attends Club or Organization Meetings:     Marital Status:    Intimate Partner Violence:     Fear of Current or Ex-Partner:     Emotionally Abused:     Physically Abused:     Sexually Abused:        Family History:        Problem Relation Age of Onset    Heart Disease Father     High Blood Pressure Father     Macular Degen Mother     Diabetes Mother     High Cholesterol Mother     High Cholesterol Brother     Diabetes Maternal Grandmother     Heart Disease Maternal Grandfather     Cancer Paternal Grandmother     Heart Disease Paternal Grandfather        Review of Systems:  A 14 point review of systems was completed. Pertinent positives identified in the HPI, all other review of systems negative. Physical Examination:    /72 (Site: Left Upper Arm)   Ht 5' 1\" (1.549 m)   Wt 162 lb (73.5 kg)   BMI 30.61 kg/m²     Weight: 162 lb (73.5 kg)       General appearance: NAD  Eyes: PERRLA  Neck: no JVD, no lymphadenopathy. Respiratory: effort is unlabored, no crackles, wheezes or rubs. Cardiovascular: regular, no murmur. No carotid bruits. Pulses:    DP   RIGHT 2   LEFT 2   GI: abdomen soft, nondistended, no organomegaly. Musculoskeletal: strength and tone normal.  Extremities: warm and pink. Several small varicosities. No large branch varicosities or palpable GSV's.   Very mild edema currently. Skin: no dermatitis or ulceration. Neuro/psychiatric: grossly intact. Assessment:      Diagnosis Orders   1. Leg swelling       Possible venous insufficiency vs lymphedema    Recommendations/Plan:    20-30 mmHg knee high compression stockings daily. Will order Venous reflux exam to r/o any significant venous insufficiency.       Pola Maddox MD, FACS

## 2021-10-25 ENCOUNTER — VIRTUAL VISIT (OUTPATIENT)
Dept: PSYCHOLOGY | Age: 51
End: 2021-10-25
Payer: COMMERCIAL

## 2021-10-25 DIAGNOSIS — F41.9 ANXIETY DISORDER, UNSPECIFIED TYPE: Primary | ICD-10-CM

## 2021-10-25 PROCEDURE — 90832 PSYTX W PT 30 MINUTES: CPT | Performed by: PSYCHOLOGIST

## 2021-10-25 NOTE — PROGRESS NOTES
Behavioral Health Consultation  Jacob Suarez Psy.D. Psychologist  10/25/2021      Time spent with Patient: 25 minutes  This is patient's eighth [facility] appointment. Reason for Consult:    Chief Complaint   Patient presents with    Anxiety     Referring Provider: Queenie Mena PA-C  [address] [place] [address]    Feedback given to PCP. TELEHEALTH VISIT -- Audio/Visual (During WHUFQ-07 public health emergency)  }  Pursuant to the emergency declaration under the [address] [place] [address], Count includes the [facility] waiver authority and the [employer] Act, this Virtual Visit was conducted, with patient's consent, to reduce the patient's risk of exposure to COVID-19 and provide continuity of care for an established patient. Services were provided through a video synchronous discussion virtually to substitute for in-person clinic visit. Pt gave verbal informed consent to participate in telehealth services. Conducted a risk-benefit analysis and determined that the patient's presenting problems are consistent with the use of telepsychology. Determined that the patient and/or guardian has sufficient knowledge and skills in the use of technology enabling them to adequately benefit from telepsychology. It was determined that this patient was able to be properly treated without an in-person session. Patient or guardian verified that they were currently located at the [facility] address that was provided during registration. Discussed consent form for telehealth and patient or guardian provided verbal consent. Verified the following information:  Patient's identification: Yes  Patient location: [address] [place] [address].   Patient's call back number: [phone]   Patient's emergency contact's name and number, as well as permission to contact them if needed: Extended Emergency Contact Information  Primary Emergency Contact: Naeem Guerra  Address: [facility]

## 2021-10-26 ENCOUNTER — TELEPHONE (OUTPATIENT)
Dept: VASCULAR SURGERY | Age: 51
End: 2021-10-26

## 2021-10-26 NOTE — TELEPHONE ENCOUNTER
Called patient with date and time of venous duplex scan at [facility].  Scheduled for 10/28/21 at 11:00am with arrival of 10:30am. rodo

## 2021-10-29 ENCOUNTER — HOSPITAL ENCOUNTER (OUTPATIENT)
Dept: VASCULAR LAB | Age: 51
Discharge: HOME | End: 2021-10-29
Payer: COMMERCIAL

## 2021-10-29 DIAGNOSIS — M79.89 LEG SWELLING: ICD-10-CM

## 2021-10-29 PROCEDURE — 93970 EXTREMITY STUDY: CPT

## 2021-11-01 ENCOUNTER — TELEPHONE (OUTPATIENT)
Dept: VASCULAR SURGERY | Age: 51
End: 2021-11-01

## 2021-11-01 ENCOUNTER — NURSE ONLY (OUTPATIENT)
Dept: PRIMARY CARE CLINIC | Age: 51
End: 2021-11-01
Payer: COMMERCIAL

## 2021-11-01 DIAGNOSIS — Z23 NEED FOR INFLUENZA VACCINATION: Primary | ICD-10-CM

## 2021-11-01 PROCEDURE — 90674 CCIIV4 VAC NO PRSV 0.5 ML IM: CPT | Performed by: PHYSICIAN ASSISTANT

## 2021-11-01 PROCEDURE — 90471 IMMUNIZATION ADMIN: CPT | Performed by: PHYSICIAN ASSISTANT

## 2021-11-01 NOTE — TELEPHONE ENCOUNTER
No signficant superficial venous insufficiency.  Some deep on right, but was more concerned about left leg swelling.  Likely lymphedema as result of prior left leg surgery.  Recc continue daily compression/support stockings as previously recommended.             Patient was notified of results per DR Laura Echeverria 325.  pamlr

## 2021-11-14 NOTE — PROGRESS NOTES
Behavioral Health Consultation  Jerome Duran Psy.D. Psychologist                                                                                                           11.15.21    Time spent with Patient: 25 minutes  This is patient's ninth [facility] appointment. Reason for Consult:    Chief Complaint   Patient presents with    Anxiety     Referring Provider: Saida Patino PA-C  [facility]    Feedback given to PCP. TELEHEALTH VISIT -- Audio/Visual (During EVYFD-81 public health emergency)  }  Pursuant to the emergency declaration under the [facility], [facility] waiver authority and the [employer] Act, this Virtual Visit was conducted, with patient's consent, to reduce the patient's risk of exposure to COVID-19 and provide continuity of care for an established patient. Services were provided through a video synchronous discussion virtually to substitute for in-person clinic visit. Pt gave verbal informed consent to participate in telehealth services. Conducted a risk-benefit analysis and determined that the patient's presenting problems are consistent with the use of telepsychology. Determined that the patient and/or guardian has sufficient knowledge and skills in the use of technology enabling them to adequately benefit from telepsychology. It was determined that this patient was able to be properly treated without an in-person session. Patient or guardian verified that they were currently located at the [facility] address that was provided during registration. Discussed consent form for telehealth and patient or guardian provided verbal consent.     Verified the following information:  Patient's identification: Yes  Patient location: [address] [place] [address] [address]   Patient's call back number: [phone]   Patient's emergency contact's name and number, as well as permission to contact them if needed: Extended Emergency Contact Information  Primary Emergency Contact: Naeem Guerra  Address: [address] [place] [address], [address] [facility] of [address] Phone: [phone]  Relation: Spouse     Provider location: [place], [address] [place] [address] St:  Rikki Haro reports that her anxiety has been \"up and down\" in relation to how her daughter is doing in college. Daughter continues to struggle with adjustment to college and they are weighing options for next semester. Rikki Haro is doing well accessing social supports around her and her affect presents as euthymic. Rikki Haro and her daughter are interested in this [facility] seeing her daughter as she is having significant difficulty accessing behavioral health support in her college town. O:  MSE:  Appearance    alert, cooperative  Appetite normal  Sleep disturbance No  Fatigue No  Loss of pleasure No  Impulsive behavior No  Speech    normal rate and normal volume  Mood    euthymic   Affect    normal affect  Thought Content    intact  Thought Process    linear  Associations    logical connections  Insight    Good  Judgment    Intact  Orientation    oriented to person, place, time, and general circumstances  Memory    recent and remote memory intact  Attention/Concentration    intact  Morbid ideation No  Suicide Assessment    no suicidal ideation    A:  Rikki Haro returns for a follow up [facility] appointment regarding concerns related to anxiety. These symptoms are improving but continue to cause impairment in response to environmental stressors. Diagnosis:  1. Anxiety disorder, unspecified type        Plan:  Pt interventions:  Discussed coping with recent stressors, slight increase in anxiety regarding driving, utilizing CBT techniques. Discussed possibly switching to new [facility]. This [facility] will follow up with family after consultation with colleagues.

## 2021-11-15 ENCOUNTER — VIRTUAL VISIT (OUTPATIENT)
Dept: PSYCHOLOGY | Age: 51
End: 2021-11-15
Payer: COMMERCIAL

## 2021-11-15 DIAGNOSIS — F41.9 ANXIETY DISORDER, UNSPECIFIED TYPE: Primary | ICD-10-CM

## 2021-11-15 PROCEDURE — 90832 PSYTX W PT 30 MINUTES: CPT | Performed by: PSYCHOLOGIST

## 2021-11-29 ENCOUNTER — OFFICE VISIT (OUTPATIENT)
Dept: PRIMARY CARE CLINIC | Age: 51
End: 2021-11-29
Payer: COMMERCIAL

## 2021-11-29 VITALS
TEMPERATURE: 98.1 F | HEART RATE: 77 BPM | BODY MASS INDEX: 31.93 KG/M2 | OXYGEN SATURATION: 97 % | SYSTOLIC BLOOD PRESSURE: 118 MMHG | DIASTOLIC BLOOD PRESSURE: 68 MMHG | WEIGHT: 169 LBS

## 2021-11-29 DIAGNOSIS — R63.2 EXCESSIVE EATING: Primary | ICD-10-CM

## 2021-11-29 PROCEDURE — 99213 OFFICE O/P EST LOW 20 MIN: CPT | Performed by: NURSE PRACTITIONER

## 2021-11-29 ASSESSMENT — ENCOUNTER SYMPTOMS
NAUSEA: 0
DIARRHEA: 0
COUGH: 0
SHORTNESS OF BREATH: 0
SINUS PRESSURE: 0

## 2021-11-29 NOTE — PROGRESS NOTES
Barber Dorsey (:  1970) is a 45 y.o. female,Established patient, here for evaluation of the following chief complaint(s):  Fatigue (needs to eat more often, been going on now for a week)         ASSESSMENT/PLAN:  1. Excessive eating  -     Hemoglobin A1C  -     TSH WITH REFLEX TO FT4      Return if symptoms worsen or fail to improve. Subjective   SUBJECTIVE/OBJECTIVE:  Feels off. She wasn't hungry. Shakey, woozey feeling. Began last week  Felt like she needed to ear, ate poorly, but it hasn't gone away  Decreased sugar, increasing protein every couple of hours  No changes in medication     Vitals:    21 1504   BP: 118/68   Pulse: 77   Temp: 98.1 °F (36.7 °C)   SpO2: 97%       Review of Systems   Constitutional: Positive for appetite change. Negative for activity change, chills, diaphoresis, fatigue and fever. Feels off, woozy. HENT: Negative for congestion and sinus pressure. Respiratory: Negative for cough and shortness of breath. Gastrointestinal: Negative for diarrhea and nausea. Neurological: Negative for dizziness and headaches. Psychiatric/Behavioral: Negative for agitation. Objective   Physical Exam  Vitals reviewed. Constitutional:       General: She is not in acute distress. Appearance: Normal appearance. She is not ill-appearing. HENT:      Head: Normocephalic. Right Ear: External ear normal.      Left Ear: External ear normal.      Nose: Nose normal.      Mouth/Throat:      Mouth: Mucous membranes are moist.   Eyes:      Extraocular Movements: Extraocular movements intact. Pupils: Pupils are equal, round, and reactive to light. Cardiovascular:      Rate and Rhythm: Normal rate. Pulmonary:      Effort: Pulmonary effort is normal.   Musculoskeletal:         General: Normal range of motion. Cervical back: Normal range of motion. Skin:     General: Skin is warm. Neurological:      General: No focal deficit present. Mental Status: She is alert and oriented to person, place, and time. An electronic signature was used to authenticate this note.     --THAO Coronado - CNP

## 2021-11-30 LAB
ESTIMATED AVERAGE GLUCOSE: 128.4 MG/DL
HBA1C MFR BLD: 6.1 %
TSH REFLEX FT4: 1.53 UIU/ML (ref 0.27–4.2)

## 2021-12-03 ENCOUNTER — OFFICE VISIT (OUTPATIENT)
Dept: PRIMARY CARE CLINIC | Age: 51
End: 2021-12-03
Payer: COMMERCIAL

## 2021-12-03 VITALS
SYSTOLIC BLOOD PRESSURE: 122 MMHG | HEART RATE: 94 BPM | TEMPERATURE: 97.9 F | OXYGEN SATURATION: 98 % | DIASTOLIC BLOOD PRESSURE: 70 MMHG

## 2021-12-03 DIAGNOSIS — H40.059 RAISED INTRAOCULAR PRESSURE, UNSPECIFIED LATERALITY: ICD-10-CM

## 2021-12-03 DIAGNOSIS — R41.89 BRAIN FOG: ICD-10-CM

## 2021-12-03 DIAGNOSIS — R51.9 INTRACTABLE HEADACHE, UNSPECIFIED CHRONICITY PATTERN, UNSPECIFIED HEADACHE TYPE: Primary | ICD-10-CM

## 2021-12-03 PROCEDURE — 99214 OFFICE O/P EST MOD 30 MIN: CPT | Performed by: NURSE PRACTITIONER

## 2021-12-03 ASSESSMENT — ENCOUNTER SYMPTOMS: COUGH: 0

## 2021-12-03 NOTE — PROGRESS NOTES
Rudy Tavarez (:  1970) is a 45 y.o. female,Established patient, here for evaluation of the following chief complaint(s): Anxiety (not feeling well)         ASSESSMENT/PLAN:  1. Intractable headache, unspecified chronicity pattern, unspecified headache type  -     CT HEAD W CONTRAST; Future  -     Comprehensive Metabolic Panel; Future  2. Brain fog  -     CT HEAD W CONTRAST; Future  -     Comprehensive Metabolic Panel; Future  3. Raised intraocular pressure, unspecified laterality  -     CT HEAD W CONTRAST; Future  -     Comprehensive Metabolic Panel; Future    Discussed having blood sugar checked next time she felt this way during the school day in the [facility]    Return if symptoms worsen or fail to improve. Subjective   SUBJECTIVE/OBJECTIVE:  Vision changes- fuzzy, but vision test normal  No change in prescription  Change in eye pressure per optometrist: No signs of glaucoma, no signs of macular degeneration  BP stable at home  Has decreased sweets and increased protein and almonds   Headache yesterday, mild, behind left ear. Did not take any medication to resolve it. Review of Systems   Constitutional: Positive for activity change and appetite change. Negative for chills and diaphoresis. HENT: Negative for congestion. Eyes: Positive for visual disturbance. Respiratory: Negative for cough. Cardiovascular: Positive for palpitations. Neurological: Positive for headaches. Fogginess         Vitals:    21 1439   BP: 122/70   Pulse: 94   Temp: 97.9 °F (36.6 °C)   SpO2: 98%       Objective   Physical Exam  Vitals reviewed. Constitutional:       General: She is not in acute distress. Appearance: Normal appearance. She is not ill-appearing. HENT:      Head: Normocephalic. Right Ear: Tympanic membrane, ear canal and external ear normal. There is no impacted cerumen.       Left Ear: Tympanic membrane, ear canal and external ear normal. There is no impacted cerumen. Nose: Nose normal.      Mouth/Throat:      Mouth: Mucous membranes are moist.   Eyes:      Extraocular Movements: Extraocular movements intact. Conjunctiva/sclera: Conjunctivae normal.      Pupils: Pupils are equal, round, and reactive to light. Cardiovascular:      Rate and Rhythm: Normal rate. Pulses: Normal pulses. Pulmonary:      Effort: Pulmonary effort is normal.   Abdominal:      General: Abdomen is flat. Musculoskeletal:         General: Normal range of motion. Cervical back: Normal range of motion. Skin:     General: Skin is warm. Capillary Refill: Capillary refill takes less than 2 seconds. Neurological:      General: No focal deficit present. Mental Status: She is alert and oriented to person, place, and time. Psychiatric:         Attention and Perception: Attention and perception normal.         Mood and Affect: Affect is flat. Speech: Speech normal.         Behavior: Behavior is withdrawn. Thought Content: Thought content normal.         Cognition and Memory: Cognition and memory normal.         Judgment: Judgment normal.              An electronic signature was used to authenticate this note.     --THAO Ornelas - CNP

## 2021-12-07 ENCOUNTER — NURSE ONLY (OUTPATIENT)
Dept: PRIMARY CARE CLINIC | Age: 51
End: 2021-12-07

## 2021-12-07 DIAGNOSIS — F41.9 ANXIETY: Primary | ICD-10-CM

## 2021-12-13 ENCOUNTER — VIRTUAL VISIT (OUTPATIENT)
Dept: PRIMARY CARE CLINIC | Age: 51
End: 2021-12-13
Payer: COMMERCIAL

## 2021-12-13 DIAGNOSIS — J01.90 ACUTE BACTERIAL SINUSITIS: Primary | ICD-10-CM

## 2021-12-13 DIAGNOSIS — R51.9 ACUTE INTRACTABLE HEADACHE, UNSPECIFIED HEADACHE TYPE: ICD-10-CM

## 2021-12-13 DIAGNOSIS — R05.9 COUGH: ICD-10-CM

## 2021-12-13 DIAGNOSIS — B96.89 ACUTE BACTERIAL SINUSITIS: Primary | ICD-10-CM

## 2021-12-13 PROCEDURE — 99213 OFFICE O/P EST LOW 20 MIN: CPT | Performed by: NURSE PRACTITIONER

## 2021-12-13 RX ORDER — BENZONATATE 100 MG/1
100 CAPSULE ORAL 3 TIMES DAILY PRN
Qty: 21 CAPSULE | Refills: 0 | Status: SHIPPED | OUTPATIENT
Start: 2021-12-13 | End: 2021-12-20

## 2021-12-13 RX ORDER — AMOXICILLIN AND CLAVULANATE POTASSIUM 875; 125 MG/1; MG/1
1 TABLET, FILM COATED ORAL 2 TIMES DAILY
Qty: 20 TABLET | Refills: 0 | Status: SHIPPED | OUTPATIENT
Start: 2021-12-13 | End: 2021-12-23

## 2021-12-13 ASSESSMENT — ENCOUNTER SYMPTOMS
COUGH: 1
SORE THROAT: 1
SINUS PAIN: 1
SINUS PRESSURE: 1
HOARSE VOICE: 0
SWOLLEN GLANDS: 0
SHORTNESS OF BREATH: 0

## 2021-12-13 NOTE — PROGRESS NOTES
Lisa Hook (:  1970) is a 45 y.o. female,Established patient, here for evaluation of the following chief complaint(s): Head Congestion (over a week)         ASSESSMENT/PLAN:  1. Acute bacterial sinusitis  -     amoxicillin-clavulanate (AUGMENTIN) 875-125 MG per tablet; Take 1 tablet by mouth 2 times daily for 10 days, Disp-20 tablet, R-0Normal  2. Cough  -     amoxicillin-clavulanate (AUGMENTIN) 875-125 MG per tablet; Take 1 tablet by mouth 2 times daily for 10 days, Disp-20 tablet, R-0Normal  -     benzonatate (TESSALON) 100 MG capsule; Take 1 capsule by mouth 3 times daily as needed for Cough, Disp-21 capsule, R-0Normal  3. Acute intractable headache, unspecified headache type  -     amoxicillin-clavulanate (AUGMENTIN) 875-125 MG per tablet; Take 1 tablet by mouth 2 times daily for 10 days, Disp-20 tablet, R-0Normal      Return if symptoms worsen or fail to improve. SUBJECTIVE/OBJECTIVE:  Patient with sinus issues for over a week. Has had 2 covid vaccines and the flu vaccine. Sinusitis  This is a new problem. The current episode started 1 to 4 weeks ago. The problem is unchanged. The pain is mild. Associated symptoms include congestion, coughing, ear pain (hearing loss), headaches (mild, bridge of the nose), sinus pressure and a sore throat (first day only). Pertinent negatives include no chills, diaphoresis, hoarse voice, neck pain, shortness of breath, sneezing or swollen glands. Treatments tried: mucinex, advil, salt water rinses. The treatment provided no relief. Review of Systems   Constitutional: Positive for activity change. Negative for appetite change, chills, diaphoresis and fatigue. HENT: Positive for congestion, ear pain (hearing loss), sinus pressure, sinus pain and sore throat (first day only). Negative for hoarse voice and sneezing. Respiratory: Positive for cough. Negative for shortness of breath. Musculoskeletal: Negative for neck pain.    Neurological: Positive for headaches (mild, bridge of the nose). All other systems reviewed and are negative. No flowsheet data found. Physical Exam  Constitutional:       Appearance: She is ill-appearing. HENT:      Head: Normocephalic. Right Ear: External ear normal.      Left Ear: External ear normal.      Nose: Congestion present. Comments: Nose red and raw     Mouth/Throat:      Mouth: Mucous membranes are moist.   Eyes:      Extraocular Movements: Extraocular movements intact. Conjunctiva/sclera: Conjunctivae normal.      Pupils: Pupils are equal, round, and reactive to light. Pulmonary:      Effort: Pulmonary effort is normal.   Musculoskeletal:         General: Normal range of motion. Cervical back: Normal range of motion. Neurological:      General: No focal deficit present. Mental Status: She is alert and oriented to person, place, and time. Abigail Boyd was evaluated through a synchronous (real-time) audio-video encounter. The patient (or guardian if applicable) is aware that this is a billable service. Verbal consent to proceed has been obtained within the past 12 months. The visit was conducted pursuant to the emergency declaration under the [facility], [address] [place] [address] authority and the [employer] General Act. Patient identification was verified, and a caregiver was present when appropriate. The patient was located in a state where the provider was credentialed to provide care. An electronic signature was used to authenticate this note.     --Carlos Mccauley, THAO - CNP

## 2022-01-12 ENCOUNTER — OFFICE VISIT (OUTPATIENT)
Dept: PRIMARY CARE CLINIC | Age: 52
End: 2022-01-12
Payer: COMMERCIAL

## 2022-01-12 VITALS
TEMPERATURE: 98.3 F | DIASTOLIC BLOOD PRESSURE: 88 MMHG | WEIGHT: 167 LBS | HEART RATE: 81 BPM | OXYGEN SATURATION: 98 % | SYSTOLIC BLOOD PRESSURE: 138 MMHG | BODY MASS INDEX: 31.55 KG/M2

## 2022-01-12 DIAGNOSIS — F32.A DEPRESSION, UNSPECIFIED DEPRESSION TYPE: ICD-10-CM

## 2022-01-12 DIAGNOSIS — F41.9 ANXIETY: Primary | ICD-10-CM

## 2022-01-12 PROCEDURE — 99214 OFFICE O/P EST MOD 30 MIN: CPT | Performed by: NURSE PRACTITIONER

## 2022-01-12 RX ORDER — VILAZODONE HYDROCHLORIDE 20 MG/1
20 TABLET ORAL DAILY
Qty: 30 TABLET | Refills: 0 | Status: SHIPPED
Start: 2022-01-19 | End: 2022-03-11

## 2022-01-12 RX ORDER — VILAZODONE HYDROCHLORIDE 10 MG/1
10 TABLET ORAL DAILY
Qty: 7 TABLET | Refills: 0 | Status: SHIPPED
Start: 2022-01-12 | End: 2022-03-11

## 2022-01-12 ASSESSMENT — PATIENT HEALTH QUESTIONNAIRE - PHQ9
SUM OF ALL RESPONSES TO PHQ QUESTIONS 1-9: 13
SUM OF ALL RESPONSES TO PHQ QUESTIONS 1-9: 13
2. FEELING DOWN, DEPRESSED OR HOPELESS: 2
3. TROUBLE FALLING OR STAYING ASLEEP: 0
SUM OF ALL RESPONSES TO PHQ9 QUESTIONS 1 & 2: 3
9. THOUGHTS THAT YOU WOULD BE BETTER OFF DEAD, OR OF HURTING YOURSELF: 0
5. POOR APPETITE OR OVEREATING: 3
SUM OF ALL RESPONSES TO PHQ QUESTIONS 1-9: 13
6. FEELING BAD ABOUT YOURSELF - OR THAT YOU ARE A FAILURE OR HAVE LET YOURSELF OR YOUR FAMILY DOWN: 3
SUM OF ALL RESPONSES TO PHQ QUESTIONS 1-9: 13
10. IF YOU CHECKED OFF ANY PROBLEMS, HOW DIFFICULT HAVE THESE PROBLEMS MADE IT FOR YOU TO DO YOUR WORK, TAKE CARE OF THINGS AT HOME, OR GET ALONG WITH OTHER PEOPLE: 1
8. MOVING OR SPEAKING SO SLOWLY THAT OTHER PEOPLE COULD HAVE NOTICED. OR THE OPPOSITE, BEING SO FIGETY OR RESTLESS THAT YOU HAVE BEEN MOVING AROUND A LOT MORE THAN USUAL: 0
7. TROUBLE CONCENTRATING ON THINGS, SUCH AS READING THE NEWSPAPER OR WATCHING TELEVISION: 2
1. LITTLE INTEREST OR PLEASURE IN DOING THINGS: 1
4. FEELING TIRED OR HAVING LITTLE ENERGY: 2

## 2022-01-12 ASSESSMENT — ENCOUNTER SYMPTOMS
ORTHOPNEA: 0
BLURRED VISION: 0
SHORTNESS OF BREATH: 0

## 2022-01-17 RX ORDER — FLUOXETINE 10 MG/1
10 CAPSULE ORAL DAILY
Qty: 30 CAPSULE | Refills: 0 | Status: SHIPPED | OUTPATIENT
Start: 2022-01-17 | End: 2022-02-09 | Stop reason: SDUPTHER

## 2022-01-17 NOTE — PROGRESS NOTES
PA denied at this time. Patient's chart review included the following medications tried/l buspirone, escitalopram, bupropion, hydroxyzine. She has tried others previously per patient. We will need to try another medication first.   Has patient reached out to psychiatrist at this time?

## 2022-02-09 RX ORDER — FLUOXETINE 10 MG/1
10 CAPSULE ORAL DAILY
Qty: 30 CAPSULE | Refills: 0 | Status: SHIPPED | OUTPATIENT
Start: 2022-02-09 | End: 2022-03-11 | Stop reason: SDUPTHER

## 2022-03-11 ENCOUNTER — OFFICE VISIT (OUTPATIENT)
Dept: PRIMARY CARE CLINIC | Age: 52
End: 2022-03-11
Payer: COMMERCIAL

## 2022-03-11 VITALS
DIASTOLIC BLOOD PRESSURE: 68 MMHG | SYSTOLIC BLOOD PRESSURE: 118 MMHG | OXYGEN SATURATION: 98 % | WEIGHT: 172.4 LBS | HEIGHT: 61 IN | HEART RATE: 74 BPM | BODY MASS INDEX: 32.55 KG/M2 | TEMPERATURE: 98 F

## 2022-03-11 DIAGNOSIS — R73.03 PRE-DIABETES: ICD-10-CM

## 2022-03-11 DIAGNOSIS — F41.9 ANXIETY: ICD-10-CM

## 2022-03-11 DIAGNOSIS — E78.2 MIXED HYPERLIPIDEMIA: ICD-10-CM

## 2022-03-11 DIAGNOSIS — I10 BENIGN ESSENTIAL HTN: ICD-10-CM

## 2022-03-11 DIAGNOSIS — Z00.00 ENCOUNTER FOR WELL ADULT EXAM WITHOUT ABNORMAL FINDINGS: Primary | ICD-10-CM

## 2022-03-11 DIAGNOSIS — F32.A DEPRESSION, UNSPECIFIED DEPRESSION TYPE: ICD-10-CM

## 2022-03-11 DIAGNOSIS — Z12.11 COLON CANCER SCREENING: ICD-10-CM

## 2022-03-11 PROCEDURE — 99396 PREV VISIT EST AGE 40-64: CPT

## 2022-03-11 RX ORDER — FLUOXETINE 10 MG/1
10 CAPSULE ORAL DAILY
Qty: 30 CAPSULE | Refills: 3 | Status: SHIPPED
Start: 2022-03-11 | End: 2022-05-31 | Stop reason: SINTOL

## 2022-03-11 RX ORDER — LOSARTAN POTASSIUM 25 MG/1
TABLET ORAL
COMMUNITY
Start: 2022-01-20 | End: 2022-05-06

## 2022-03-11 ASSESSMENT — ENCOUNTER SYMPTOMS
COUGH: 0
VOMITING: 0
NAUSEA: 0
SHORTNESS OF BREATH: 0

## 2022-03-11 NOTE — PROGRESS NOTES
Well Adult Note  Name: Brenda Camera Date: 3/11/2022   MRN: [id number] Sex: Female   Age: 45 y.o. Ethnicity: Non- / Non    : 1970 Race: White (non-)      Mateusz Clement is here for well adult exam.  History:  Being treated for anxiety and depression. Currently taking fluoxetine 10mg daily. Was previously taking Wellbutrin, but stopped due to side effects (increased eye pressure, nausea, HA, feelings of low blood sugar). Was prescribed Vilazodone but it was not covered by insurance. She feels that the 10mg is working well for her. Less tearful outbursts. Mood is more stabilized. Has been trying to find a local therapist but has trouble finding someone. She plans to call [facility] to find an in-network provider. \"Sleeping like a rock. \" Feels that the medication is helping her sleep. Takes it at night. Leaving today to drive down to [place] for her daughter's golf tournament. Reports that she is doing really well in college, which has helped her overall mood as well. Her daughter is coming home for the summer, which she is looking forward to. [facility] with [facility] for HTN. Meds reviewed today and BP stable. Saw Dr. Lupillo Pina with vascular surgery due to left leg swelling. Completed a venous duplex scan. Recommended daily compression/support stockings. Vasculitis worsens during golf tournaments and being on her feet. Requesting a medical letter today allowing her to have a golf cart during her daughter's tournaments. Review of Systems   Constitutional: Negative for chills, fatigue and fever. Respiratory: Negative for cough and shortness of breath. Cardiovascular: Negative for chest pain and leg swelling. Gastrointestinal: Negative for nausea and vomiting. Genitourinary: Negative for frequency. Musculoskeletal: Negative for myalgias. Skin: Negative. Neurological: Negative for dizziness, weakness and headaches.    Psychiatric/Behavioral: Positive for dysphoric mood. Negative for sleep disturbance. The patient is nervous/anxious. Allergies   Allergen Reactions    Erythromycin          Prior to Visit Medications    Medication Sig Taking?  Authorizing Provider   FLUoxetine (PROZAC) 10 MG capsule Take 1 capsule by mouth daily Yes THAO Aguayo CNP   atorvastatin (LIPITOR) 10 MG tablet Take 1 tablet by mouth daily Yes Sindy Buckley PA-C   amLODIPine (NORVASC) 5 MG tablet TAKE ONE TABLET BY MOUTH DAILY  Patient taking differently: 2.5 daily Yes Sindy Buckley PA-C   metoprolol tartrate (LOPRESSOR) 25 MG tablet Take 1 tablet by mouth 2 times daily  Patient taking differently: Take 25 mg by mouth daily One half daily Yes Yareli Walton MD   aspirin 81 MG EC tablet Take 81 mg by mouth daily Yes Historical Provider, MD   valACYclovir (VALTREX) 500 MG tablet Take 500 mg by mouth See Admin Instructions Yes Historical Provider, MD   esomeprazole (NEXIUM) 20 MG delayed release capsule Take 1 capsule by mouth daily TAKE ONE CAPSULE BY MOUTH DAILY Yes THAO Noe CNP   Cholecalciferol (VITAMIN D3) 5000 units TABS Take by mouth Yes Historical Provider, MD   Cyanocobalamin (VITAMIN B-12 PO) Take 1,200 mg by mouth daily  Yes Historical Provider, MD   losartan (COZAAR) 25 MG tablet   Historical Provider, MD         Past Medical History:   Diagnosis Date    Anxiety     Diabetes ([facility] [address]     GERD (gastroesophageal reflux disease)     Hyperlipidemia     Hypertension     [facility]        Past Surgical History:   Procedure Laterality Date    HYSTERECTOMY, TOTAL ABDOMINAL      Has ovaries    TONSILLECTOMY           Family History   Problem Relation Age of Onset    Heart Disease Father     High Blood Pressure Father     Macular Degen Mother     Diabetes Mother     High Cholesterol Mother     High Cholesterol Brother     Diabetes Maternal Grandmother     Heart Disease Maternal Grandfather     Cancer Paternal Grandmother    Werner Tavarez Heart Disease Paternal Grandfather        Social History     Tobacco Use    Smoking status: Never Smoker    Smokeless tobacco: Never Used   Vaping Use    Vaping Use: Never used   Substance Use Topics    Alcohol use: Yes    Drug use: No       Objective   /68   Pulse 74   Temp 98 °F (36.7 °C)   Ht 5' 1\" (1.549 m)   Wt 172 lb 6.4 oz (78.2 kg)   SpO2 98%   BMI 32.57 kg/m²   Wt Readings from Last 3 Encounters:   03/11/22 172 lb 6.4 oz (78.2 kg)   01/12/22 167 lb (75.8 kg)   12/15/21 162 lb (73.5 kg)     There were no vitals filed for this visit. Physical Exam  Vitals reviewed. Constitutional:       Appearance: Normal appearance. HENT:      Head: Normocephalic. Right Ear: External ear normal.      Left Ear: External ear normal.      Mouth/Throat:      Mouth: Mucous membranes are moist.   Eyes:      Extraocular Movements: Extraocular movements intact. Pupils: Pupils are equal, round, and reactive to light. Cardiovascular:      Rate and Rhythm: Normal rate and regular rhythm. Pulses: Normal pulses. Heart sounds: Normal heart sounds. Pulmonary:      Effort: Pulmonary effort is normal.      Breath sounds: Normal breath sounds. Abdominal:      General: Abdomen is flat. Bowel sounds are normal.      Palpations: Abdomen is soft. Musculoskeletal:         General: Normal range of motion. Cervical back: Normal range of motion and neck supple. Skin:     General: Skin is warm and dry. Neurological:      General: No focal deficit present. Mental Status: She is alert. Psychiatric:         Mood and Affect: Mood normal.         Assessment   Plan   1. Encounter for well adult exam without abnormal findings  -     CBC with Auto Differential; Future  -     Comprehensive Metabolic Panel; Future  2. Anxiety  -     FLUoxetine (PROZAC) 10 MG capsule; Take 1 capsule by mouth daily, Disp-30 capsule, R-3Normal  3.  Depression, unspecified depression type  -     FLUoxetine (PROZAC) 10 MG capsule; Take 1 capsule by mouth daily, Disp-30 capsule, R-3Normal  4. Benign essential HTN  -     CBC with Auto Differential; Future  -     Comprehensive Metabolic Panel; Future  5. Mixed hyperlipidemia  -     Lipid Panel; Future  -     Comprehensive Metabolic Panel; Future  6. Colon cancer screening  -     Cologuard  7. Pre-diabetes  -     Hemoglobin A1C; Future  -     Comprehensive Metabolic Panel; Future     -Will keep Fluoxetine dose at 10mg for right now. -She will return for labs when she is fasting.      Personalized Preventive Plan   Current Health Maintenance Status  Immunization History   Administered Date(s) Administered    COVID-19, Pfizer Purple top, DILUTE for use, 12+ yrs, 30mcg/0.3mL dose 02/26/2021, 03/18/2021    Influenza A (H1Z1-01) Vaccine PF IM 01/10/2010    Influenza Virus Vaccine 10/12/2010, 10/05/2014, 10/08/2019    Influenza, MDCK Quadv, IM, PF (Flucelvax 2 yrs and older) 10/21/2018, 10/13/2020, 11/01/2021    Influenza, Charlene Neat, IM, PF (6 mo and older Fluzone, Flulaval, Fluarix, and 3 yrs and older Afluria) 10/07/2016, 10/21/2017    Influenza, Triv, 3 Years and older, IM (Afluria (5 yrs and older) 10/12/2020    Td, unspecified formulation 01/01/2008    Tdap (Boostrix, Adacel) 02/13/2015        Health Maintenance   Topic Date Due    Colorectal Cancer Screen  Never done    Shingles Vaccine (1 of 2) Never done    COVID-19 Vaccine (3 - Booster for Marrero Peter series) 08/18/2021    Lipid screen  03/17/2022    Potassium monitoring  03/17/2022    Creatinine monitoring  03/17/2022    A1C test (Diabetic or Prediabetic)  11/29/2022    Depression Monitoring  01/12/2023    Breast cancer screen  02/19/2024    DTaP/Tdap/Td vaccine (2 - Td or Tdap) 02/13/2025    Flu vaccine  Completed    Hepatitis A vaccine  Aged Out    Hepatitis B vaccine  Aged Out    Hib vaccine  Aged Out    Meningococcal (ACWY) vaccine  Aged Out    Pneumococcal 0-64 years Vaccine  Aged Out    Hepatitis C screen  Discontinued    HIV screen  Discontinued     Recommendations for Preventive Services Due: see orders and patient instructions/AVS.    Return in about 3 months (around 6/11/2022).

## 2022-03-11 NOTE — LETTER
[address]  [address] [facility]  [address] [address]  Phone: [phone]  Fax: [phone]    THAO Dumont CNP        March 11, 2022     Patient: Faviola Hurd   YOB: 1970   Date of Visit: 3/11/2022       To Whom It May Concern: It is my medical opinion that Alanna Ellison needs to be able to use a golf cart due to vasculitis. If you have any questions or concerns, please don't hesitate to call.     Sincerely,        THAO Dumont CNP

## 2022-03-25 DIAGNOSIS — I10 ESSENTIAL HYPERTENSION: ICD-10-CM

## 2022-03-25 RX ORDER — AMLODIPINE BESYLATE 2.5 MG/1
2.5 TABLET ORAL DAILY
Qty: 30 TABLET | Refills: 3 | Status: SHIPPED | OUTPATIENT
Start: 2022-03-25 | End: 2022-07-13 | Stop reason: SDUPTHER

## 2022-05-06 ENCOUNTER — OFFICE VISIT (OUTPATIENT)
Dept: PRIMARY CARE CLINIC | Age: 52
End: 2022-05-06
Payer: COMMERCIAL

## 2022-05-06 VITALS
DIASTOLIC BLOOD PRESSURE: 70 MMHG | OXYGEN SATURATION: 97 % | BODY MASS INDEX: 32.69 KG/M2 | WEIGHT: 173 LBS | SYSTOLIC BLOOD PRESSURE: 126 MMHG | HEART RATE: 70 BPM | TEMPERATURE: 97.9 F

## 2022-05-06 DIAGNOSIS — F41.9 ANXIETY: ICD-10-CM

## 2022-05-06 DIAGNOSIS — I10 ESSENTIAL HYPERTENSION: Primary | ICD-10-CM

## 2022-05-06 DIAGNOSIS — F32.A DEPRESSION, UNSPECIFIED DEPRESSION TYPE: ICD-10-CM

## 2022-05-06 PROCEDURE — 99212 OFFICE O/P EST SF 10 MIN: CPT

## 2022-05-06 ASSESSMENT — ENCOUNTER SYMPTOMS
VOMITING: 0
COUGH: 0
SHORTNESS OF BREATH: 0
DIARRHEA: 0
NAUSEA: 0

## 2022-05-06 NOTE — PROGRESS NOTES
[address] [facility]  2022    Fer Irizarry (:  1970) is a 45 y.o. female, here for evaluation of the following medical concerns:    Chief Complaint   Patient presents with    Hypertension        ASSESSMENT/ PLAN  1. Essential hypertension  - Stable on current regimen    2. Depression, unspecified depression type  - She wishes to continue on the 10mg fluoxetine for another few weeks to see if exercising more and seeing her new therapist improves her symptoms. 3. Anxiety  - She wishes to continue on the 10mg fluoxetine for another few weeks to see if exercising more and seeing her new therapist improves her symptoms. Return in about 1 month (around 2022) for Mood, Medication, BP check. HPI  Here to follow-up on BP. Stable on current regimen. 2.5 mg Norvasc daily and 25 mg metoprolol daily. She is unsure if the fluoxetine is continuing to work well for her. Started back in January. Failed Wellbutrin due to side effects. Trouble with focus. Finding it hard to even focus to pray. Appetite ebs and flows, feels that she is gaining weight. Up 6# since January. Feels \"nothing. \" Emotions blunted. Unable to cry or feel her emotions. Low motivation, not following through on tasks. Has missed apx 20 days of work this year, which she states is very unusual for her. Started with a new therapist yesterday. Virtual and in-person. Paying out of pocket, so only going every other week for now. Adopted a rescue dog a few weeks ago. Busy with training, etc. Goes for walks multiple times a day. Joined the [employer] this past weekend and went to an exercise class. Goal is to go 3x/week with her spouse. Denies doing any of her hobbies recently. Crocheting. A lot of \"starting and stopping\" of craft projects. Has been unable to play golf since last summer due to shoulder injury. Daughter is coming home soon from college.     Having episodes of feeling flushed/heat in her head.   Hx of hysterectomy, but she has her ovaries. ROS  Review of Systems   Constitutional: Positive for appetite change. Negative for chills, fatigue, fever and unexpected weight change. HENT: Negative. Respiratory: Negative for cough and shortness of breath. Cardiovascular: Negative for chest pain, palpitations and leg swelling. Gastrointestinal: Negative for diarrhea, nausea and vomiting. Genitourinary: Negative for difficulty urinating. Musculoskeletal: Negative for myalgias. Skin: Negative. Neurological: Negative for dizziness, weakness and headaches. Psychiatric/Behavioral: Positive for decreased concentration and dysphoric mood. HISTORIES  Current Outpatient Medications on File Prior to Visit   Medication Sig Dispense Refill    amLODIPine (NORVASC) 2.5 MG tablet Take 1 tablet by mouth daily 30 tablet 3    FLUoxetine (PROZAC) 10 MG capsule Take 1 capsule by mouth daily 30 capsule 3    atorvastatin (LIPITOR) 10 MG tablet Take 1 tablet by mouth daily 90 tablet 3    metoprolol tartrate (LOPRESSOR) 25 MG tablet Take 1 tablet by mouth 2 times daily (Patient taking differently: Take 25 mg by mouth daily One half daily) 60 tablet 3    aspirin 81 MG EC tablet Take 81 mg by mouth daily      valACYclovir (VALTREX) 500 MG tablet Take 500 mg by mouth See Admin Instructions      esomeprazole (NEXIUM) 20 MG delayed release capsule Take 1 capsule by mouth daily TAKE ONE CAPSULE BY MOUTH DAILY 30 capsule 5    Cholecalciferol (VITAMIN D3) 5000 units TABS Take by mouth      Cyanocobalamin (VITAMIN B-12 PO) Take 1,200 mg by mouth daily        No current facility-administered medications on file prior to visit.       Past Medical History:   Diagnosis Date    Anxiety     Diabetes ([facility]     GERD (gastroesophageal reflux disease)     Hyperlipidemia     Hypertension     Melanoma [facility])      Patient Active Problem List   Diagnosis    Malignant melanoma of thigh ([facility]    Bunion of right foot    Ganglion cyst of right foot    Mixed hyperlipidemia    Benign essential HTN    Gastroesophageal reflux disease without esophagitis    Anxiety    Palpitations       PE  Vitals:    05/06/22 0854   BP: 126/70   Pulse: 70   Temp: 97.9 °F (36.6 °C)   TempSrc: Temporal   SpO2: 97%   Weight: 173 lb (78.5 kg)     Estimated body mass index is 32.69 kg/m² as calculated from the following:    Height as of 3/11/22: 5' 1\" (1.549 m). Weight as of this encounter: 173 lb (78.5 kg). Physical Exam  Vitals reviewed. Constitutional:       Appearance: Normal appearance. HENT:      Head: Normocephalic. Right Ear: External ear normal.      Left Ear: External ear normal.      Mouth/Throat:      Mouth: Mucous membranes are moist.   Eyes:      Extraocular Movements: Extraocular movements intact. Pupils: Pupils are equal, round, and reactive to light. Cardiovascular:      Rate and Rhythm: Normal rate and regular rhythm. Pulses: Normal pulses. Heart sounds: Normal heart sounds. Pulmonary:      Effort: Pulmonary effort is normal.      Breath sounds: Normal breath sounds. Abdominal:      General: Abdomen is flat. Bowel sounds are normal.      Palpations: Abdomen is soft. Musculoskeletal:         General: Normal range of motion. Cervical back: Normal range of motion and neck supple. Skin:     General: Skin is warm and dry. Neurological:      General: No focal deficit present. Mental Status: She is alert.    Psychiatric:         Mood and Affect: Mood normal.         Ariel Tomlin, APRN - CNP

## 2022-05-31 ENCOUNTER — TELEMEDICINE (OUTPATIENT)
Dept: PRIMARY CARE CLINIC | Age: 52
End: 2022-05-31
Payer: COMMERCIAL

## 2022-05-31 DIAGNOSIS — R41.89 BRAIN FOG: ICD-10-CM

## 2022-05-31 DIAGNOSIS — F41.9 ANXIETY: ICD-10-CM

## 2022-05-31 DIAGNOSIS — R53.83 FATIGUE, UNSPECIFIED TYPE: ICD-10-CM

## 2022-05-31 DIAGNOSIS — R63.5 WEIGHT GAIN: ICD-10-CM

## 2022-05-31 DIAGNOSIS — F32.A DEPRESSION, UNSPECIFIED DEPRESSION TYPE: Primary | ICD-10-CM

## 2022-05-31 PROCEDURE — 99213 OFFICE O/P EST LOW 20 MIN: CPT

## 2022-05-31 ASSESSMENT — ENCOUNTER SYMPTOMS
COUGH: 0
SHORTNESS OF BREATH: 0
NAUSEA: 0
VOMITING: 0
DIARRHEA: 0

## 2022-05-31 NOTE — PROGRESS NOTES
Monroe Nunn (:  1970) is a Established patient, here for evaluation of the following:    Assessment & Plan   Below is the assessment and plan developed based on review of pertinent history, physical exam, labs, studies, and medications. 1. Depression, unspecified depression type        -     Discussed switching SSRI medications. She would like to do a trial period off of medications. Advised to taper the fluoxetine; will do every other day for 2 weeks. -     Follicle Stimulating Hormone; Future  2. Fatigue, unspecified type  -     TSH; Future   -     T4, Free; Future  -     Follicle Stimulating Hormone; Future  3. Weight gain  -     TSH; Future  -     T4, Free; Future  -     Follicle Stimulating Hormone; Future  4. Anxiety  -     TSH; Future  -     T4, Free; Future  5. Brain fog  -     Follicle Stimulating Hormone; Future    Return in about 4 weeks (around 2022) for Mood.         -Call sooner than 4 weeks if any issues coming off of the medication.  -Provided her with contact #'s to [employer] in [place], and [employer] on [place].  -Lab orders placed (has standing labs ordered from previous visit). She will come in fasting to get these completed. -Will work on the goal of going to the [employer] 2-3x/week with her daughter. Subjective   Video visit to touch base on anti-depression medication & mood. She has been out of school for 1 week; keeping busy with training her new dog, Bruna Haskins. Overall, still struggling with fatigue, lack of motivation, and emotions feeling blunted. Started fluoxetine 10mg in January and tolerated well for the first few months. Now, feels like she cannot feel anything. Feels \"foggy\" and trouble remembering things. She has tried Wellbutrin in the past, but stopped due to side effects. Has Lexapro listed in med history, but never took it ? Unsure why. She also tried Buspar.    States that with medications, she tends to do well for the first few months and then the side effects outweigh the benefit. She had 2 visits with a therapist recently but felt like \"she wasn't a good fit. \" She is in the process of looking for another therapist.     She is questioning if some of her symptoms (fatigue, weight gain, foggy feeling) are due to menopause. She had a hysterectomy in 2018 but still has her ovaries. +heat intolerance. Will crank up the A/C in the house and the family complains of it being too cold while she is sweating. Reports sleeping well. Feels that she gets restful sleep at night, but feels fatigued and worn down during the day. Joined the [employer] last month. Has only gone once. Trouble getting motivated to go. Her oldest daughter is wanting to go in the mornings before work with [employer]. She thinks this is a realistic goal for her. Would like to go 2-3x/week. She is also hoping to make some diet changes. Reports eating a diet high in added sugars. Review of Systems   Constitutional: Positive for activity change and fatigue. Negative for chills and fever. HENT: Negative. Respiratory: Negative for cough and shortness of breath. Cardiovascular: Negative for chest pain, palpitations and leg swelling. Gastrointestinal: Negative for diarrhea, nausea and vomiting. Endocrine: Positive for heat intolerance. Genitourinary: Negative for difficulty urinating. Musculoskeletal: Negative. Neurological: Negative for weakness and headaches. Psychiatric/Behavioral: Positive for dysphoric mood.           Objective   Patient-Reported Vitals  No data recorded     Physical Exam  [INSTRUCTIONS:  \"[x]\" Indicates a positive item  \"[]\" Indicates a negative item  -- DELETE ALL ITEMS NOT EXAMINED]    Constitutional: [x] Appears well-developed and well-nourished [x] No apparent distress      [] Abnormal -     Mental status: [x] Alert and awake  [x] Oriented to person/place/time [x] Able to follow commands    [] Abnormal -     Eyes:   EOM [x]  Normal    [] Abnormal -   Sclera  [x]  Normal    [] Abnormal -          Discharge [x]  None visible   [] Abnormal -     HENT: [x] Normocephalic, atraumatic  [] Abnormal -   [x] Mouth/Throat: Mucous membranes are moist    External Ears [x] Normal  [] Abnormal -    Neck: [x] No visualized mass [] Abnormal -     Pulmonary/Chest: [x] Respiratory effort normal   [x] No visualized signs of difficulty breathing or respiratory distress        [] Abnormal -      Musculoskeletal:   [x] Normal gait with no signs of ataxia         [x] Normal range of motion of neck        [] Abnormal -     Neurological:        [x] No Facial Asymmetry (Cranial nerve 7 motor function) (limited exam due to video visit)          [x] No gaze palsy        [] Abnormal -          Skin:        [x] No significant exanthematous lesions or discoloration noted on facial skin         [] Abnormal -            Psychiatric:       [x] Normal Affect [] Abnormal -        [x] No Hallucinations    Other pertinent observable physical exam findings:-       Sebastien Taylor, was evaluated through a synchronous (real-time) audio-video encounter. The patient (or guardian if applicable) is aware that this is a billable service, which includes applicable co-pays. This Virtual Visit was conducted with patient's (and/or legal guardian's) consent. The visit was conducted pursuant to the emergency declaration under the [address] [place] [address], [address] [place] [address] authority and the [employer] ([employer] General Act. Patient identification was verified, and a caregiver was present when appropriate. The patient was located at Home: [address]. Provider was located at [facility] (Appt Dept): [address] [place] [address]  [facility].         --Marty Thomas, APRN - CNP

## 2022-06-28 ENCOUNTER — NURSE ONLY (OUTPATIENT)
Dept: PRIMARY CARE CLINIC | Age: 52
End: 2022-06-28
Payer: COMMERCIAL

## 2022-06-28 DIAGNOSIS — Z00.00 ENCOUNTER FOR WELL ADULT EXAM WITHOUT ABNORMAL FINDINGS: ICD-10-CM

## 2022-06-28 DIAGNOSIS — I10 BENIGN ESSENTIAL HTN: ICD-10-CM

## 2022-06-28 DIAGNOSIS — R63.5 WEIGHT GAIN: ICD-10-CM

## 2022-06-28 DIAGNOSIS — F41.9 ANXIETY: ICD-10-CM

## 2022-06-28 DIAGNOSIS — E78.2 MIXED HYPERLIPIDEMIA: ICD-10-CM

## 2022-06-28 DIAGNOSIS — R73.03 PRE-DIABETES: ICD-10-CM

## 2022-06-28 DIAGNOSIS — R41.89 BRAIN FOG: ICD-10-CM

## 2022-06-28 DIAGNOSIS — R51.9 INTRACTABLE HEADACHE, UNSPECIFIED CHRONICITY PATTERN, UNSPECIFIED HEADACHE TYPE: ICD-10-CM

## 2022-06-28 DIAGNOSIS — F32.A DEPRESSION, UNSPECIFIED DEPRESSION TYPE: ICD-10-CM

## 2022-06-28 DIAGNOSIS — R53.83 FATIGUE, UNSPECIFIED TYPE: ICD-10-CM

## 2022-06-28 DIAGNOSIS — H40.059 RAISED INTRAOCULAR PRESSURE, UNSPECIFIED LATERALITY: ICD-10-CM

## 2022-06-28 PROCEDURE — 36415 COLL VENOUS BLD VENIPUNCTURE: CPT

## 2022-06-28 NOTE — PROGRESS NOTES
Patient came into the office per physician's request for the following blood test(s): CBC, LIPID, A1C, and CPM, TSH, T4, FSH.       Blood drawn in office by DL    # of tubes sent: 1 SST, 1 LAV

## 2022-06-29 LAB
A/G RATIO: 1.4 (ref 1.1–2.2)
ALBUMIN SERPL-MCNC: 3.8 G/DL (ref 3.4–5)
ALP BLD-CCNC: 70 U/L (ref 40–129)
ALT SERPL-CCNC: 22 U/L (ref 10–40)
ANION GAP SERPL CALCULATED.3IONS-SCNC: 15 MMOL/L (ref 3–16)
AST SERPL-CCNC: 21 U/L (ref 15–37)
BASOPHILS ABSOLUTE: 0.1 K/UL (ref 0–0.2)
BASOPHILS RELATIVE PERCENT: 0.7 %
BILIRUB SERPL-MCNC: 0.3 MG/DL (ref 0–1)
BUN BLDV-MCNC: 12 MG/DL (ref 7–20)
CALCIUM SERPL-MCNC: 9 MG/DL (ref 8.3–10.6)
CHLORIDE BLD-SCNC: 101 MMOL/L (ref 99–110)
CHOLESTEROL, TOTAL: 248 MG/DL (ref 0–199)
CO2: 22 MMOL/L (ref 21–32)
CREAT SERPL-MCNC: 0.6 MG/DL (ref 0.6–1.1)
EOSINOPHILS ABSOLUTE: 0.2 K/UL (ref 0–0.6)
EOSINOPHILS RELATIVE PERCENT: 2.4 %
ESTIMATED AVERAGE GLUCOSE: 125.5 MG/DL
FOLLICLE STIMULATING HORMONE: 11.4 MIU/ML
GFR AFRICAN AMERICAN: >60
GFR NON-AFRICAN AMERICAN: >60
GLUCOSE BLD-MCNC: 88 MG/DL (ref 70–99)
HBA1C MFR BLD: 6 %
HCT VFR BLD CALC: 47.2 % (ref 36–48)
HDLC SERPL-MCNC: 66 MG/DL (ref 40–60)
HEMOGLOBIN: 15.7 G/DL (ref 12–16)
LDL CHOLESTEROL CALCULATED: 150 MG/DL
LYMPHOCYTES ABSOLUTE: 2.1 K/UL (ref 1–5.1)
LYMPHOCYTES RELATIVE PERCENT: 24.8 %
MCH RBC QN AUTO: 29.2 PG (ref 26–34)
MCHC RBC AUTO-ENTMCNC: 33.2 G/DL (ref 31–36)
MCV RBC AUTO: 87.8 FL (ref 80–100)
MONOCYTES ABSOLUTE: 0.7 K/UL (ref 0–1.3)
MONOCYTES RELATIVE PERCENT: 7.6 %
NEUTROPHILS ABSOLUTE: 5.6 K/UL (ref 1.7–7.7)
NEUTROPHILS RELATIVE PERCENT: 64.5 %
PDW BLD-RTO: 13.9 % (ref 12.4–15.4)
PLATELET # BLD: 337 K/UL (ref 135–450)
PMV BLD AUTO: 8.4 FL (ref 5–10.5)
POTASSIUM SERPL-SCNC: 4.2 MMOL/L (ref 3.5–5.1)
RBC # BLD: 5.37 M/UL (ref 4–5.2)
SODIUM BLD-SCNC: 138 MMOL/L (ref 136–145)
T4 FREE: 1.2 NG/DL (ref 0.9–1.8)
TOTAL PROTEIN: 6.6 G/DL (ref 6.4–8.2)
TRIGL SERPL-MCNC: 161 MG/DL (ref 0–150)
TSH SERPL DL<=0.05 MIU/L-ACNC: 1.51 UIU/ML (ref 0.27–4.2)
VLDLC SERPL CALC-MCNC: 32 MG/DL
WBC # BLD: 8.6 K/UL (ref 4–11)

## 2022-07-13 ENCOUNTER — PATIENT MESSAGE (OUTPATIENT)
Dept: PRIMARY CARE CLINIC | Age: 52
End: 2022-07-13

## 2022-07-13 DIAGNOSIS — F32.A DEPRESSION, UNSPECIFIED DEPRESSION TYPE: ICD-10-CM

## 2022-07-13 DIAGNOSIS — F41.9 ANXIETY: ICD-10-CM

## 2022-07-13 RX ORDER — AMLODIPINE BESYLATE 2.5 MG/1
2.5 TABLET ORAL DAILY
Qty: 30 TABLET | Refills: 3 | Status: SHIPPED | OUTPATIENT
Start: 2022-07-13

## 2022-07-13 RX ORDER — FLUOXETINE 10 MG/1
10 CAPSULE ORAL DAILY
Qty: 30 CAPSULE | Refills: 3 | Status: SHIPPED | OUTPATIENT
Start: 2022-07-13

## 2022-07-13 NOTE — TELEPHONE ENCOUNTER
From: Padmini Jones  To: Aminata Chaudhari  Sent: 7/13/2022 7:56 AM EDT  Subject: Refills    I am out of refills on 2 medications; fluoxetine and amlodipine. When you have time, can you please send those in to [facility]? [phone]    Thanks so much!

## 2022-10-04 DIAGNOSIS — E78.2 MIXED HYPERLIPIDEMIA: Primary | ICD-10-CM

## 2022-10-04 RX ORDER — ATORVASTATIN CALCIUM 10 MG/1
10 TABLET, FILM COATED ORAL DAILY
Qty: 90 TABLET | Refills: 0 | Status: SHIPPED | OUTPATIENT
Start: 2022-10-04

## 2022-11-07 ENCOUNTER — OFFICE VISIT (OUTPATIENT)
Dept: PRIMARY CARE CLINIC | Age: 52
End: 2022-11-07
Payer: COMMERCIAL

## 2022-11-07 VITALS
SYSTOLIC BLOOD PRESSURE: 108 MMHG | OXYGEN SATURATION: 96 % | HEART RATE: 97 BPM | RESPIRATION RATE: 12 BRPM | DIASTOLIC BLOOD PRESSURE: 72 MMHG | BODY MASS INDEX: 34.77 KG/M2 | TEMPERATURE: 98.1 F | WEIGHT: 184 LBS

## 2022-11-07 DIAGNOSIS — Z23 NEED FOR INFLUENZA VACCINATION: ICD-10-CM

## 2022-11-07 DIAGNOSIS — I10 BENIGN ESSENTIAL HTN: ICD-10-CM

## 2022-11-07 DIAGNOSIS — M54.50 LUMBAR BACK PAIN: Primary | ICD-10-CM

## 2022-11-07 PROCEDURE — 90471 IMMUNIZATION ADMIN: CPT

## 2022-11-07 PROCEDURE — 90674 CCIIV4 VAC NO PRSV 0.5 ML IM: CPT

## 2022-11-07 PROCEDURE — 99213 OFFICE O/P EST LOW 20 MIN: CPT

## 2022-11-07 PROCEDURE — 3078F DIAST BP <80 MM HG: CPT

## 2022-11-07 PROCEDURE — 3074F SYST BP LT 130 MM HG: CPT

## 2022-11-07 ASSESSMENT — ENCOUNTER SYMPTOMS
BACK PAIN: 1
SHORTNESS OF BREATH: 0
COUGH: 0

## 2022-11-07 NOTE — PROGRESS NOTES
[address] [facility]  2022    Lara Saldaña (:  1970) is a 47 y.o. female, here for evaluation of the following medical concerns:    Chief Complaint   Patient presents with    Lower Back Pain        ASSESSMENT/ PLAN  1. Lumbar back pain  - Discussed conservative management due to mild symptoms. NSAIDs as needed. I gave her some exercises to do at home, as well as a few Salonpas patches for local pain relief. - Please call if symptoms worsen. Likely due to sleeping on an air mattress, driving to/from [place], etc.    2. Benign essential HTN  - Stable on current regimen    3. Need for influenza vaccination  - Influenza, FLUCELVAX, (age 10 mo+), IM, Preservative Free, 0.5 mL       Return if symptoms worsen or fail to improve. HPI  Here today for a general follow-up visit, as well as to discuss some back pain issues that she has been having. She drove her daughter back down to school in [place] last week. She then came back up here for her follow-up appointment. She had spent 3 weeks down in [place] with her daughter helping her recover from surgery. Tells me that they ate out a lot and weren't very active. She brought her walking shoes but didn't get them out of the car. Ate ice cream most nights, etc.  Reports that she has gained about 10lbs in the past 6 months. Having some vague lower back pain/discomfort. Starts in her lower back and wraps around to her right hip. Comes and goes. She has been unable to identify a pattern. Turning her torso and coughing makes the pain worse. At its worst, rates the pain as a 2/10. Sitting here now, pain is a 0/10. Denies weakness, numbness, tingling, etc. No shooting pain down the leg. Denies any changes to bowel or bladder habits. She is able to do her daily activities. It is not interfering with her quality of life. She slept on an air mattress for apx 3 weeks when she was staying with her daughter. Normally sleeps on both sides.    She has not tried any medication for her symptoms. Has used Aleve in the past for joint discomfort. She has followed with [facility] in the past.      ROS  Review of Systems   Constitutional:  Negative for activity change, chills, fatigue and fever. Respiratory:  Negative for cough and shortness of breath. Genitourinary:  Negative for difficulty urinating. Musculoskeletal:  Positive for back pain. Negative for gait problem, neck pain and neck stiffness. Neurological:  Negative for weakness and numbness. Psychiatric/Behavioral:  Positive for dysphoric mood. HISTORIES  Current Outpatient Medications on File Prior to Visit   Medication Sig Dispense Refill    atorvastatin (LIPITOR) 10 MG tablet Take 1 tablet by mouth daily 90 tablet 0    amLODIPine (NORVASC) 2.5 MG tablet Take 1 tablet by mouth daily 30 tablet 3    FLUoxetine (PROZAC) 10 MG capsule Take 1 capsule by mouth daily 30 capsule 3    metoprolol tartrate (LOPRESSOR) 25 MG tablet Take 1 tablet by mouth 2 times daily (Patient taking differently: Take 25 mg by mouth daily One half daily) 60 tablet 3    aspirin 81 MG EC tablet Take 81 mg by mouth daily      valACYclovir (VALTREX) 500 MG tablet Take 500 mg by mouth See Admin Instructions      esomeprazole (NEXIUM) 20 MG delayed release capsule Take 1 capsule by mouth daily TAKE ONE CAPSULE BY MOUTH DAILY 30 capsule 5    Cholecalciferol (VITAMIN D3) 5000 units TABS Take by mouth      Cyanocobalamin (VITAMIN B-12 PO) Take 1,200 mg by mouth daily        No current facility-administered medications on file prior to visit.       Past Medical History:   Diagnosis Date    Anxiety     Diabetes ([facility]     GERD (gastroesophageal reflux disease)     Hyperlipidemia     Hypertension     Melanoma ([facility]      Patient Active Problem List   Diagnosis    Malignant melanoma of thigh ([facility]    Bunion of right foot    Ganglion cyst of right foot    Mixed hyperlipidemia    Benign essential HTN    Gastroesophageal reflux disease without esophagitis    Anxiety    Palpitations       PE  Vitals:    11/07/22 1420   BP: 108/72   Pulse: 97   Resp: 12   Temp: 98.1 °F (36.7 °C)   TempSrc: Temporal   SpO2: 96%   Weight: 184 lb (83.5 kg)     Estimated body mass index is 34.77 kg/m² as calculated from the following:    Height as of 3/11/22: 5' 1\" (1.549 m). Weight as of this encounter: 184 lb (83.5 kg). Physical Exam  Vitals reviewed. Constitutional:       Appearance: Normal appearance. HENT:      Head: Normocephalic. Right Ear: External ear normal.      Left Ear: External ear normal.      Mouth/Throat:      Mouth: Mucous membranes are moist.   Eyes:      Extraocular Movements: Extraocular movements intact. Pupils: Pupils are equal, round, and reactive to light. Cardiovascular:      Rate and Rhythm: Normal rate and regular rhythm. Pulses: Normal pulses. Heart sounds: Normal heart sounds. Pulmonary:      Effort: Pulmonary effort is normal.      Breath sounds: Normal breath sounds. Abdominal:      General: Abdomen is flat. Bowel sounds are normal.      Palpations: Abdomen is soft. Musculoskeletal:         General: Normal range of motion. Cervical back: Normal range of motion and neck supple. Lumbar back: Tenderness (slight to right side) present. No swelling or spasms. Normal range of motion. Negative right straight leg raise test and negative left straight leg raise test.   Skin:     General: Skin is warm and dry. Capillary Refill: Capillary refill takes less than 2 seconds. Neurological:      General: No focal deficit present. Mental Status: She is alert. Motor: No weakness.       Coordination: Coordination normal.      Gait: Gait normal.      Deep Tendon Reflexes: Reflexes normal.   Psychiatric:         Mood and Affect: Mood normal.       Winsome Rodriguez, APRN - CNP

## 2022-11-07 NOTE — PATIENT INSTRUCTIONS
Can try ibuprofen/Aleve for pain/discomfort  Lidocaine patches as needed  Let me know if pain increases or worsens

## 2022-11-16 DIAGNOSIS — F41.9 ANXIETY: ICD-10-CM

## 2022-11-16 DIAGNOSIS — F32.A DEPRESSION, UNSPECIFIED DEPRESSION TYPE: ICD-10-CM

## 2022-11-16 RX ORDER — FLUOXETINE 10 MG/1
10 CAPSULE ORAL DAILY
Qty: 30 CAPSULE | Refills: 3 | Status: SHIPPED | OUTPATIENT
Start: 2022-11-16

## 2022-12-05 RX ORDER — AMLODIPINE BESYLATE 2.5 MG/1
2.5 TABLET ORAL DAILY
Qty: 90 TABLET | Refills: 1 | Status: SHIPPED | OUTPATIENT
Start: 2022-12-05

## 2022-12-15 NOTE — PROGRESS NOTES
Behavioral Health Consultation  Cate Anderson Psy.D. Psychologist  12/19/2022        Time spent with patient and/or patient family: 35 minutes  [facility] in over a [facility], 10th appointment    Reason for Consult:    Chief Complaint   Patient presents with    Anxiety     Referring Provider: THAO So - CNP  [facility]    Patient or patient's guardian provided informed consent for the behavioral health program. Discussed with patient/guardian model of service to include the limits of confidentiality (i.e. abuse reporting, suicide intervention, etc.) and short-term intervention focused approach. Patient/guardian indicated understanding. Feedback given to PCP. TELEHEALTH VISIT -- Audio/Visual (During FFRJK-79 public health emergency)  Astrid Guzmán, was evaluated through a synchronous (real-time) audio-video encounter. The patient (or guardian if applicable) is aware that this is a billable service, which includes applicable co-pays. This Virtual Visit was conducted with patient's (and/or legal guardian's) consent. The visit was conducted pursuant to the emergency declaration under the [address] [place] [address] authority and the [employer] General Act. Patient identification was verified, and a caregiver was present when appropriate. The patient was located in a state where the provider was licensed to provide care. Conducted a risk-benefit analysis and determined that the patient's presenting problems are consistent with the use of telepsychology. Determined that the patient or patient guardian has sufficient knowledge and skills in the use of technology enabling them to adequately benefit from telepsychology. It was determined that this patient was able to be properly treated without an in-person session.  Reviewed telehealth consent form with patient and they provided verbal consent. Verified the following information:  Patient's identification: Yes  Patient location: [facility]ickie Lozano [address]  Patient's call back number: [phone]   Patient's emergency contact's name and number, as well as permission to contact them if needed: Extended Emergency Contact Information  Primary Emergency Contact: Naeem Guerra  [facility] [address] Phone: [phone]  Relation: Spouse     Provider location: [place], [place]     Subjective:  Family:  Elisa Joshi resides in her home with her   and two children . Elisa Joshi reports that her family relations are generally positive. Current stressors reported include: none. Health/Development:  Current health concerns include none. She is currently employed. She does not enjoy her job very much. She is taking time to herself on the weekends and evenings. She has missed many days of work this semester but has not had any negative consequences. However, this has caused a lot of anxiety and guilt. Once she gets to school, she feels OK. Elisa Joshi does not have difficulties sleeping. Elisa Joshi does not have diet concerns. She consumes caffeine in the form of two cans of Coke each day. Regarding exercise, Elisa Joshi has been fairly sendentary. Emotional/Social:  Elisa Joshi is having some difficulty with avoidance of work. In regards to attention, hyperactivity, and impulsivity, Elisa Joshi reports difficulty completing tasks, disorganization, avoids tasks that require sustained mental effort, forgetful, talks excessively, difficulty waiting turns. Elisa Joshi has notbeen previously diagnosed with ADHD. In terms of substance use, Elisa Joshi endorses the following: none. Elisa Joshi does report feeling that she experiences more sadness than others. Specifically, on certain days she will sometimes have a low day and will feel guilty/bad about herself. She denies hopelessness.  She has some negative self-talk on these days. She does not think this is due to just work. \"I can't do this\". She has some of this due to feeling unsure of what will happen in a given day. Boby Garcia does report feeling that she experiences more anxiety than others. Specifically, she feels she is better able to manage her anxiety now. They indicated that she does not experience OCD-like symptoms. Finally, Boby Garcia reported that  she   does not experience more anger or irritability than others. Boby Garcia reports no history of physical or sexual abuse and indicated no current or prior suicidal or homicidal ideation, intent, or plan. Boby Garcia states that she is sometimes avoiding social interactions; is mostly socializing at work. She is hoping to get started with pickleball. She is prescribed fluoxetine. Objective:  MSE:  Appearance    alert, cooperative  Appetite normal  Sleep disturbance No  Fatigue No  Loss of pleasure No  Impulsive behavior No  Speech    normal rate and normal volume  Mood    euthymic   Affect    slightly flat  Thought Content    intact  Thought Process    linear  Associations    logical connections  Insight    Good  Judgment    Intact  Orientation    oriented to person, place, time, and general circumstances  Memory    recent and remote memory intact  Attention/Concentration    intact  Morbid ideation No  Suicide Assessment    no suicidal ideation    Assessment:  Boby Garcia presents for an initial [facility] appointment regarding concerns related to  anxiety and executive dysfunction. Current safety concerns reported include: none. Diagnosis:  1. Anxiety disorder, unspecified type          Diagnosis Date    Anxiety     Diabetes ([facility]     GERD (gastroesophageal reflux disease)     Hyperlipidemia     Hypertension     Melanoma ([facility]        Plan:  Pt interventions:  Gathered relevant background information and discussed [facility] role. Discussed that anxiety and executive dysfunction may be at the root of work avoidance. Advised her to increase movement over the winter break, as this will help both clusters of symptoms; reviewed cognitive distortions that might get in the way of that.      Pt Behavioral Change Plan:   See above

## 2022-12-19 ENCOUNTER — TELEMEDICINE (OUTPATIENT)
Dept: PSYCHOLOGY | Age: 52
End: 2022-12-19

## 2022-12-19 DIAGNOSIS — F41.9 ANXIETY DISORDER, UNSPECIFIED TYPE: Primary | ICD-10-CM

## 2023-01-03 DIAGNOSIS — E78.2 MIXED HYPERLIPIDEMIA: ICD-10-CM

## 2023-01-03 RX ORDER — ATORVASTATIN CALCIUM 10 MG/1
10 TABLET, FILM COATED ORAL DAILY
Qty: 90 TABLET | Refills: 0 | Status: SHIPPED | OUTPATIENT
Start: 2023-01-03

## 2023-01-18 ENCOUNTER — TELEMEDICINE (OUTPATIENT)
Dept: PSYCHOLOGY | Age: 53
End: 2023-01-18
Payer: COMMERCIAL

## 2023-01-18 DIAGNOSIS — F41.9 ANXIETY DISORDER, UNSPECIFIED TYPE: Primary | ICD-10-CM

## 2023-01-18 PROCEDURE — 90832 PSYTX W PT 30 MINUTES: CPT | Performed by: PSYCHOLOGIST

## 2023-01-18 NOTE — PROGRESS NOTES
Behavioral Health Consultation  Jeovanny Cullen Psy.D. Psychologist  1/18/2023      Time spent with Patient: 25 minutes  This is patient's  11th  [facility] appointment. Reason for Consult:    Chief Complaint   Patient presents with    Anxiety     Referring Provider: THAO Powers - CNP  [address]    Feedback given to PCP. TELEHEALTH VISIT -- Audio/Visual (During XNWNJ-24 public health emergency)  Reinaldo Sandoval, was evaluated through a synchronous (real-time) audio-video encounter. The patient (or guardian if applicable) is aware that this is a billable service, which includes applicable co-pays. This Virtual Visit was conducted with patient's (and/or legal guardian's) consent. The visit was conducted pursuant to the emergency declaration under the [address] [place] [address], [address] [place] [address] authority and the [employer] General Act. Patient identification was verified, and a caregiver was present when appropriate. The patient was located in a state where the provider was licensed to provide care. Conducted a risk-benefit analysis and determined that the patient's presenting problems are consistent with the use of telepsychology. Determined that the patient and/or guardian has sufficient knowledge and skills in the use of technology enabling them to adequately benefit from telepsychology. It was determined that this patient was able to be properly treated without an in-person session. Patient or guardian verified that they were currently located at the [facility] address that was provided during registration. Discussed consent form for telehealth and patient or guardian provided verbal consent.     Verified the following information:  Patient's identification: Yes  Patient location: [facility]  Patient's call back number: [phone]   Patient's emergency contact's name and number, as well as permission to contact them if needed: Extended Emergency Contact Information  Primary Emergency Contact: Naeem Guerra  Address: [address] [place] [address] [place]  Home Phone: [phone]  Relation: Spouse     Provider location: [place], [place]     S:  Celina reports having a nice winter break. Positively, she has not missed any work so far this semester! She minimizes this accomplishment. She states she has been going to work by telling herself that it is not an option to skip. Celina feels frustrated with herself in her difficulty meal prepping, losing weight, etc..  She continues to feel negatively in the morning when getting ready for work.    O:  MSE:  Appearance    alert, cooperative  Appetite normal  Sleep disturbance No  Fatigue No  Loss of pleasure No  Impulsive behavior No  Speech    normal rate and normal volume  Mood    euthymic   Affect    slightly depressed affect  Thought Content    intact  Thought Process    linear  Associations    logical connections  Insight    Good  Judgment    Intact  Orientation    oriented to person, place, time, and general circumstances  Memory    recent and remote memory intact  Attention/Concentration    intact  Morbid ideation No  Suicide Assessment    no suicidal ideation    A:  Celina presents for a follow up [facility] appointment regarding concerns related to anxiety.  These symptoms are showing slight improvement.  Safety concerns reported include: none.    Diagnosis:  1. Anxiety disorder, unspecified type        Plan:  Pt interventions:  Reviewed progress and provided praise for effective coping. Taught strategies for reducing decision making demands on weekday mornings; she will implement. Discussed need for self-praise and reinforcement, practiced using CBT strategies toward this goal.    Pt Behavioral Change Plan:   See above

## 2023-02-22 ENCOUNTER — TELEMEDICINE (OUTPATIENT)
Dept: PSYCHOLOGY | Age: 53
End: 2023-02-22

## 2023-02-22 DIAGNOSIS — F41.1 GENERALIZED ANXIETY DISORDER: Primary | ICD-10-CM

## 2023-02-22 PROCEDURE — 90832 PSYTX W PT 30 MINUTES: CPT | Performed by: PSYCHOLOGIST

## 2023-02-22 NOTE — PROGRESS NOTES
Behavioral Health Consultation  Ildefonso Martinez Psy.D. Psychologist  2/22/2023      Time spent with Patient: 25 minutes  This is patient's  12th  [facility] appointment. Reason for Consult:    Chief Complaint   Patient presents with    Anxiety     Referring Provider: Love Kat, THAO - CNP  [facility]    Feedback given to PCP. TELEHEALTH VISIT -- Audio/Visual (During WIM-47 [facility] emergency)  Dalila Bradford, was evaluated through a synchronous (real-time) audio-video encounter. The patient (or guardian if applicable) is aware that this is a billable service, which includes applicable co-pays. This Virtual Visit was conducted with patient's (and/or legal guardian's) consent. The visit was conducted pursuant to the emergency declaration under the [facility], [address] [place] [address] waiver authority and the [employer] General Act. Patient identification was verified, and a caregiver was present when appropriate. The patient was located in a state where the provider was licensed to provide care. Conducted a risk-benefit analysis and determined that the patient's presenting problems are consistent with the use of telepsychology. Determined that the patient and/or guardian has sufficient knowledge and skills in the use of technology enabling them to adequately benefit from telepsychology. It was determined that this patient was able to be properly treated without an in-person session. Patient or guardian verified that they were currently located at the [facility] address that was provided during registration. Discussed consent form for telehealth and patient or guardian provided verbal consent. Verified the following information:  Patient's identification: Yes  Patient location: [facility]  Patient's call back number: [phone]   Patient's emergency contact's name and number, as well as permission to contact them if needed: Extended Emergency Contact Information  Primary Emergency Contact: Naeem Guerra  Address: [address], [address] Phone: [phone]  Relation: Spouse     Provider location: [place], [address] [place] [address] St:  Sis Enciso reports that she has made good progress in adding more movement into her routine. She is doing more of her morning routine in the evening; she has seen progress in her well-being from both of these changes. She has been paying close attention to any black and white thinking that might be creeping up. She has also made several doctors appointments that she was putting off. Sis Enciso is concerned about her weight gain, does not like the way it looks on her. She identifies take out food as a variable she may be able to work on. She has been going to work every day except for one planned self-care day. O:  MSE:  Appearance    alert, cooperative  Appetite normal  Sleep disturbance No  Fatigue No  Loss of pleasure No  Impulsive behavior No  Speech    normal rate and normal volume  Mood    euthymic   Affect    normal affect  Thought Content    intact  Thought Process    linear  Associations    logical connections  Insight    Good  Judgment    Intact  Orientation    oriented to person, place, time, and general circumstances  Memory    recent and remote memory intact  Attention/Concentration    intact  Morbid ideation No  Suicide Assessment    no suicidal ideation    A:  Sis Enciso presents for a follow up [facility] appointment regarding concerns related to anxiety. These symptoms are are improving. Safety concerns reported include: none. Diagnosis:  1. Generalized anxiety disorder        Plan:  Pt interventions:  Reviewed progress and provided praise for effective coping. Provided feedback about negative self-talk as was relevant.  Discussed using +1/-1 strategy; she will decrease her fast food intake (in collaboration with her ) and continue to add movement.      Pt Behavioral Change Plan:   See above

## 2023-03-09 ENCOUNTER — NURSE ONLY (OUTPATIENT)
Dept: PRIMARY CARE CLINIC | Age: 53
End: 2023-03-09

## 2023-03-09 VITALS — SYSTOLIC BLOOD PRESSURE: 116 MMHG | DIASTOLIC BLOOD PRESSURE: 76 MMHG

## 2023-03-19 DIAGNOSIS — E78.2 MIXED HYPERLIPIDEMIA: ICD-10-CM

## 2023-03-19 DIAGNOSIS — F41.9 ANXIETY: ICD-10-CM

## 2023-03-19 DIAGNOSIS — F32.A DEPRESSION, UNSPECIFIED DEPRESSION TYPE: ICD-10-CM

## 2023-03-20 RX ORDER — ATORVASTATIN CALCIUM 10 MG/1
10 TABLET, FILM COATED ORAL DAILY
Qty: 90 TABLET | Refills: 0 | Status: SHIPPED | OUTPATIENT
Start: 2023-03-20

## 2023-03-20 RX ORDER — FLUOXETINE 10 MG/1
10 CAPSULE ORAL DAILY
Qty: 30 CAPSULE | Refills: 3 | Status: SHIPPED | OUTPATIENT
Start: 2023-03-20

## 2023-03-22 ENCOUNTER — TELEMEDICINE (OUTPATIENT)
Dept: PSYCHOLOGY | Age: 53
End: 2023-03-22
Payer: COMMERCIAL

## 2023-03-22 DIAGNOSIS — F41.1 GENERALIZED ANXIETY DISORDER: Primary | ICD-10-CM

## 2023-03-22 PROCEDURE — 90832 PSYTX W PT 30 MINUTES: CPT | Performed by: PSYCHOLOGIST

## 2023-03-22 NOTE — PROGRESS NOTES
Behavioral Health Consultation  Rachel Diaz Psy.D. Psychologist  3/22/2023      Time spent with Patient: 25 minutes  This is patient's  13th  [facility] appointment. Reason for Consult:    Chief Complaint   Patient presents with    Anxiety     Referring Provider: THAO Rees CNP  [facility]    Feedback given to PCP. TELEHEALTH VISIT -- Audio/Visual (During XCWVD-18 public health emergency)  Emelia Hull, was evaluated through a synchronous (real-time) audio-video encounter. The patient (or guardian if applicable) is aware that this is a billable service, which includes applicable co-pays. This Virtual Visit was conducted with patient's (and/or legal guardian's) consent. The visit was conducted pursuant to the emergency declaration under the [address] [place] [address], [address] [place] [address] authority and the [email or web address] General Act. Patient identification was verified, and a caregiver was present when appropriate. The patient was located in a state where the provider was licensed to provide care. Conducted a risk-benefit analysis and determined that the patient's presenting problems are consistent with the use of telepsychology. Determined that the patient and/or guardian has sufficient knowledge and skills in the use of technology enabling them to adequately benefit from telepsychology. It was determined that this patient was able to be properly treated without an in-person session. Patient or guardian verified that they were currently located at the [facility] address that was provided during registration. Discussed consent form for telehealth and patient or guardian provided verbal consent.     Verified the following information:  Patient's identification: Yes  Patient location: [facility]  Patient's call back number: [phone]   Patient's emergency contact's name and number, as well as permission to contact them if [facility]   Physical Therapy Treatment Note / Progress Note  Name: Anthony Ball  Clinic Number: [id number]    Therapy Diagnosis:   Encounter Diagnosis   Name Primary?    Balance disorder Yes       Physician: Isaias Myles MD    Visit Date: 2/24/2023    Physician Orders: PT Eval and Treat  Medical Diagnosis from Referral:   R29.898 (ICD-10-CM) - Leg weakness, bilateral   R53.81 (ICD-10-CM) - Physical deconditioning   R53.1 (ICD-10-CM) - Generalized weakness   Evaluation Date: 1/31/2023  Authorization Period Expiration: 01/05/2024  Plan of Care Expiration: 5/30/2023  Progress Note Due: 3/24/2023  Visit # / Visits authorized: 4/ 40   FOTO: 1/1     Precautions: Standard and Diabetes     PTA Visit #: 3/5     Time In: 1:45 am  Time Out: 2:30 am  Total Billable Time: 45 minutes    SUBJECTIVE     Pt reports: his doctor told him he will put in for home health but it could take 2 weeks  He was compliant with home exercise program.  Response to previous treatment: fatigue  Functional change: ongoing    Pain: 0/10  Location: n/a    OBJECTIVE     Lumbar ROM: (measured in degrees) grossly WFL BLEs knee and hips and lumbar    Degrees Quality   flexion    90     extension    10     Rotation R WFL     Rotation L WFL        Active/Passive Knee ROM: (measured in degrees)     RLE LLE   Flexion 120/125 120/125   Extension 0/2 -3/0         Lower Extremity Strength (graded 0-5 out of 5)    RLE LLE   Hip flexion: 4-/5 4-/5   Hip extension: 4-/5 4-/5   Ankle dorsiflexion: 4/5 4/5   Ankle plantarflexion 4/5 4/5   Knee flexion 4+/5 4+/5   Knee extension 4/5 4-/5   Hip adduction 4/5 4/5   Hip abduction 4-/5 4-/5      Special Tests: ((+): pos.; (-): neg.)      Balance Assessment:         NEUMANN Assessment     1. Sitting to Standing              3 - able to stand independely using hands  2. Standing Unsupported              3 - able to stand 2 minutes with supervision  3. Sitting Unsupported              4 - able to  sit safely and securely 2 minutes  4. Standing to Sitting              3 - controls descent by using hands  5. Pivot Transfer              3 - able to transfer safely with definite use of hands  6. Standing with Eyes Closed              4 - albe to stand 10 seconds safely  7. Standing with Feet Together              3 - able to place feet together independently and stand for 1 minute with supervision  8. Reaching Forward with Outstretched Arm              2 - can reach forward 5 cm/2 inches safely  9. Retrieving Object from Floor              2 - unable to  but reaches 2-5 cm from slipper and keeps balance independently  10. Turning to Look Behind              3 - looks behind one side only, other side less weight shift  11. Turning 360 Degrees              2 - able to turn 360 safely but slowly  12. Placing Alternate Foot on Step              2 - able to complete 4 steps without aid with supervision  13. Standing with One Foot in Front              0 - Looses balance while stepping or standing  14. Standing on One Foot              0 - unable to try or needs assistance to prevent fall  Total: 34  Maximum: 56      Functional: transfers sit to stand Mod I/S with SPC  Supine to sit to supine S overall due to increased time to execute  Rolling supine to prone to supine S/SBA  Palpation: neg grossly lumbar and bilateral lower extremity  Gait: increased SIERRA, decreased anjel, increased ER, decreased heel strike and toe off, mild B trendelenburg     CMS Impairment/Limitation/Restriction for FOTO lower extremity Survey     Therapist reviewed FOTO scores for Anthony Ball on 2/24/2023.   FOTO documents entered into [employer] - see Media section.     Limitation Score: 52%               Treatment     Anthony received the treatments listed below:      therapeutic exercises to develop strength and endurance for 45 minutes including:  Sit to stands x10  LAQ 3x10  Seated marching 3x10  Supine hip abduction with green band  3x10  Seated hip adduction ball squeeze 3x10  Nu step 10'  Gait training with SPC     neuromuscular re-education activities to improve: Balance for 00 minutes. The following activities were included:  Araujo Balance test    therapeutic activities to improve functional performance for 00  minutes, including:      Patient Education and Home Exercises     Home Exercises Provided and Patient Education Provided     Education provided:   - HEP    Written Home Exercises Provided: Patient instructed to cont prior HEP. Exercises were reviewed and Anthony was able to demonstrate them prior to the end of the session.  Anthony demonstrated good  understanding of the education provided. See EMR under Patient Instructions for exercises provided during therapy sessions    ASSESSMENT     Anthony presents to treatment ambulating with SPC with slowed unsteady gait with wide SIERRA. He is progressing slowly with endurance and required 1 seated rest break during gait training. Objective measures reveal only mild change in status as noted by improved FOTO score. PT believes pt could benefit from home health PT in order to manage pt's functional capcity given that he demonstrates fear avoidant movement patterns and decreased stamina.    Anthony Is progressing well towards his goals.   Pt prognosis is Fair.     Pt will continue to benefit from skilled outpatient physical therapy to address the deficits listed in the problem list box on initial evaluation, provide pt/family education and to maximize pt's level of independence in the home and community environment.     Pt's spiritual, cultural and educational needs considered and pt agreeable to plan of care and goals.     Anticipated Barriers for therapy: none     Goals:STG 3 weeks  1.  Pt to be independent with HEP for increased functional mobility and pain control.  2.  Pt to increase AROM at 100 degs trunk flexion for increased functional mobility and transfers.  3.  Pt to increased functional  mobility/transfers to independent with supine to sit to supine and rolling to prone and to supine  without increased time to execute.  4. Pt to ambulate 250' Mod I with SPC without LOB for increased tolerance of functional mobility.  5. Complete howell balance test and set proper long term goals for balance.     Long Term Goals: 8 weeks   1.  Pt to be independent with pain management strategies and verbalize 2 strategies for increased functional mobility and pain control.  2.  Pt to increase B hip extension and abduction to 4+/5  for increased functional mobility and transfers.  3.  Pt to increased gait to 500' feet Mod I with SPC to include outdoors up and down ramps and 6 steps with 1 Hr step over step pattern.       PLAN     Continue to progress strength and balance for improved functional mobility     Colt Harris, PT

## 2023-03-29 ENCOUNTER — OFFICE VISIT (OUTPATIENT)
Dept: PRIMARY CARE CLINIC | Age: 53
End: 2023-03-29
Payer: COMMERCIAL

## 2023-03-29 VITALS
WEIGHT: 179 LBS | HEART RATE: 80 BPM | SYSTOLIC BLOOD PRESSURE: 122 MMHG | BODY MASS INDEX: 33.82 KG/M2 | OXYGEN SATURATION: 97 % | TEMPERATURE: 97.6 F | DIASTOLIC BLOOD PRESSURE: 70 MMHG

## 2023-03-29 DIAGNOSIS — F41.9 ANXIETY: ICD-10-CM

## 2023-03-29 DIAGNOSIS — Z12.11 COLON CANCER SCREENING: ICD-10-CM

## 2023-03-29 DIAGNOSIS — I10 BENIGN ESSENTIAL HTN: Primary | ICD-10-CM

## 2023-03-29 DIAGNOSIS — E78.2 MIXED HYPERLIPIDEMIA: ICD-10-CM

## 2023-03-29 PROBLEM — M70.62 TROCHANTERIC BURSITIS OF BOTH HIPS: Status: ACTIVE | Noted: 2023-02-21

## 2023-03-29 PROBLEM — M70.61 TROCHANTERIC BURSITIS OF BOTH HIPS: Status: ACTIVE | Noted: 2023-02-21

## 2023-03-29 PROCEDURE — 3074F SYST BP LT 130 MM HG: CPT

## 2023-03-29 PROCEDURE — 3078F DIAST BP <80 MM HG: CPT

## 2023-03-29 PROCEDURE — 99213 OFFICE O/P EST LOW 20 MIN: CPT

## 2023-03-29 ASSESSMENT — ENCOUNTER SYMPTOMS
COUGH: 0
SHORTNESS OF BREATH: 0
NAUSEA: 0
VOMITING: 0

## 2023-03-29 ASSESSMENT — PATIENT HEALTH QUESTIONNAIRE - PHQ9
6. FEELING BAD ABOUT YOURSELF - OR THAT YOU ARE A FAILURE OR HAVE LET YOURSELF OR YOUR FAMILY DOWN: 1
SUM OF ALL RESPONSES TO PHQ QUESTIONS 1-9: 2
3. TROUBLE FALLING OR STAYING ASLEEP: 0
9. THOUGHTS THAT YOU WOULD BE BETTER OFF DEAD, OR OF HURTING YOURSELF: 0
2. FEELING DOWN, DEPRESSED OR HOPELESS: 0
SUM OF ALL RESPONSES TO PHQ9 QUESTIONS 1 & 2: 0
4. FEELING TIRED OR HAVING LITTLE ENERGY: 1
8. MOVING OR SPEAKING SO SLOWLY THAT OTHER PEOPLE COULD HAVE NOTICED. OR THE OPPOSITE, BEING SO FIGETY OR RESTLESS THAT YOU HAVE BEEN MOVING AROUND A LOT MORE THAN USUAL: 0
7. TROUBLE CONCENTRATING ON THINGS, SUCH AS READING THE NEWSPAPER OR WATCHING TELEVISION: 0
SUM OF ALL RESPONSES TO PHQ QUESTIONS 1-9: 2
SUM OF ALL RESPONSES TO PHQ QUESTIONS 1-9: 2
10. IF YOU CHECKED OFF ANY PROBLEMS, HOW DIFFICULT HAVE THESE PROBLEMS MADE IT FOR YOU TO DO YOUR WORK, TAKE CARE OF THINGS AT HOME, OR GET ALONG WITH OTHER PEOPLE: 0
5. POOR APPETITE OR OVEREATING: 0
SUM OF ALL RESPONSES TO PHQ QUESTIONS 1-9: 2
1. LITTLE INTEREST OR PLEASURE IN DOING THINGS: 0

## 2023-03-29 NOTE — PROGRESS NOTES
mouth      Cyanocobalamin (VITAMIN B-12 PO) Take 1,200 mg by mouth daily        No current facility-administered medications on file prior to visit. Past Medical History:   Diagnosis Date    Anxiety     Diabetes ([facility] [address]     GERD (gastroesophageal reflux disease)     Hyperlipidemia     Hypertension     Melanoma ([facility] [address]      Patient Active Problem List   Diagnosis    Bunion of right foot    Ganglion cyst of right foot    Mixed hyperlipidemia    Benign essential HTN    Gastroesophageal reflux disease without esophagitis    Anxiety    Palpitations    Trochanteric bursitis of both hips       PE  Vitals:    03/29/23 1400   BP: 122/70   Pulse: 80   Temp: 97.6 °F (36.4 °C)   TempSrc: Temporal   SpO2: 97%   Weight: 179 lb (81.2 kg)     Estimated body mass index is 33.82 kg/m² as calculated from the following:    Height as of 3/11/22: 5' 1\" (1.549 m). Weight as of this encounter: 179 lb (81.2 kg). Physical Exam  Vitals reviewed. Constitutional:       Appearance: Normal appearance. She is not ill-appearing. HENT:      Head: Normocephalic. Right Ear: External ear normal.      Left Ear: External ear normal.      Nose: Nose normal.      Mouth/Throat:      Mouth: Mucous membranes are moist.   Eyes:      Extraocular Movements: Extraocular movements intact. Pupils: Pupils are equal, round, and reactive to light. Neck:      Vascular: No carotid bruit. Cardiovascular:      Rate and Rhythm: Normal rate and regular rhythm. Pulses: Normal pulses. Heart sounds: Normal heart sounds. Pulmonary:      Effort: Pulmonary effort is normal.      Breath sounds: Normal breath sounds. Abdominal:      General: Abdomen is flat. Bowel sounds are normal.      Palpations: Abdomen is soft. Musculoskeletal:         General: Normal range of motion. Cervical back: Normal range of motion and neck supple. Skin:     General: Skin is warm and dry. Capillary Refill: Capillary refill takes less than 2 seconds.

## 2023-04-03 DIAGNOSIS — E78.2 MIXED HYPERLIPIDEMIA: ICD-10-CM

## 2023-04-04 RX ORDER — ATORVASTATIN CALCIUM 10 MG/1
10 TABLET, FILM COATED ORAL DAILY
Qty: 90 TABLET | Refills: 0 | Status: SHIPPED | OUTPATIENT
Start: 2023-04-04

## 2023-04-19 ENCOUNTER — TELEMEDICINE (OUTPATIENT)
Dept: PSYCHOLOGY | Age: 53
End: 2023-04-19
Payer: COMMERCIAL

## 2023-04-19 DIAGNOSIS — F41.1 GENERALIZED ANXIETY DISORDER: Primary | ICD-10-CM

## 2023-04-19 PROCEDURE — 90832 PSYTX W PT 30 MINUTES: CPT | Performed by: PSYCHOLOGIST

## 2023-04-19 NOTE — PROGRESS NOTES
needed: Extended Emergency Contact Information  Primary Emergency Contact: Naeem Guerra  Address: [address], [address] [facility] [address] Phone: [phone]  Relation: Spouse     Provider location: [address]:  Yenny Rdz has had some decrease in exercise since our last appointment. She had some anxiety while driving down to her daughter in college, but was able to complete the drive. Yenny Rdz has been having an increase in stress due to interactions with her own parents, who have been having some health problems but not disclosing information to the rest of the family. She has set boundaries with them in other periods of her life. Yenny Rdz is still optimistic about her job next semester. O:  MSE:  Appearance    alert, cooperative  Appetite normal  Sleep disturbance No  Fatigue No  Loss of pleasure No  Impulsive behavior No  Speech    normal rate and normal volume  Mood    Anxious   Affect    anxiety  Thought Content    intact  Thought Process    linear  Associations    logical connections  Insight    Good  Judgment    Intact  Orientation    oriented to person, place, time, and general circumstances  Memory    recent and remote memory intact  Attention/Concentration    intact  Morbid ideation No  Suicide Assessment    no suicidal ideation    A:  Yenny Rdz presents for a follow up [facility] appointment regarding concerns related to anxiety. These symptoms are worsening some in response to environmental stressors. Safety concerns reported include: none. Diagnosis:  No diagnosis found. Plan:  Pt interventions:  Reviewed progress and provided praise for effective coping. Reviewed use of coping with driving to daughter. Discussed relationship with parents and strategies for boundaries, including incorporating her sibling into decision making.     Pt Behavioral Change Plan:   See above

## 2023-05-08 RX ORDER — AMLODIPINE BESYLATE 2.5 MG/1
2.5 TABLET ORAL DAILY
Qty: 90 TABLET | Refills: 1 | Status: SHIPPED | OUTPATIENT
Start: 2023-05-08

## 2023-05-08 NOTE — TELEPHONE ENCOUNTER
Last Appt: 3/29/2023    Last OV: 3/29/2023 Return in about 3 months (around 6/29/2023) for Fasting Labs (Lipids, etc). Next OV: None scheduled.

## 2023-05-17 ENCOUNTER — TELEMEDICINE (OUTPATIENT)
Dept: PSYCHOLOGY | Age: 53
End: 2023-05-17
Payer: COMMERCIAL

## 2023-05-17 DIAGNOSIS — F41.1 GENERALIZED ANXIETY DISORDER: Primary | ICD-10-CM

## 2023-05-17 PROCEDURE — 90832 PSYTX W PT 30 MINUTES: CPT | Performed by: PSYCHOLOGIST

## 2023-05-17 NOTE — PROGRESS NOTES
Behavioral Health Consultation  Raul Pitts Psy.D. Psychologist  5/17/2023      Time spent with Patient: 25 minutes  This is patient's  15th  [facility] appointment. Reason for Consult:    Chief Complaint   Patient presents with    Anxiety     Referring Provider: Socrates Prather, APRN - CNP  [facility]    Feedback given to PCP. TELEHEALTH VISIT -- Audio/Visual (During ZZYBQ-62 public health emergency)  Maylon Push, was evaluated through a synchronous (real-time) audio-video encounter. The patient (or guardian if applicable) is aware that this is a billable service, which includes applicable co-pays. This Virtual Visit was conducted with patient's (and/or legal guardian's) consent. Patient identification was verified, and a caregiver was present when appropriate. The patient was located in a state where the provider was licensed to provide care. Conducted a risk-benefit analysis and determined that the patient's presenting problems are consistent with the use of telepsychology. Determined that the patient and/or guardian has sufficient knowledge and skills in the use of technology enabling them to adequately benefit from telepsychology. It was determined that this patient was able to be properly treated without an in-person session. Patient or guardian verified that they were currently located at the [facility] address that was provided during registration. Discussed consent form for telehealth and patient or guardian provided verbal consent.     Verified the following information:  Patient's identification: Yes  Patient location: [address] [place] [address] [address] [employer] [address]   Patient's call back number: [phone]   Patient's emergency contact's name and number, as well as permission to contact them if needed: Extended Emergency Contact Information  Primary Emergency Contact: Naeem Guerra  Address: [facility], [address] [employer] [address]

## 2023-06-05 ENCOUNTER — TELEPHONE (OUTPATIENT)
Dept: PRIMARY CARE CLINIC | Age: 53
End: 2023-06-05

## 2023-06-05 DIAGNOSIS — E78.2 MIXED HYPERLIPIDEMIA: Primary | ICD-10-CM

## 2023-06-05 RX ORDER — ATORVASTATIN CALCIUM 10 MG/1
10 TABLET, FILM COATED ORAL DAILY
Qty: 30 TABLET | Refills: 0 | Status: SHIPPED | OUTPATIENT
Start: 2023-06-05

## 2023-06-05 NOTE — TELEPHONE ENCOUNTER
Refill request received via fax for the 10mg atorvastatin. Patient is almost due for her follow-up visit & labs. I will send in a 30 day supply.

## 2023-06-22 ENCOUNTER — OFFICE VISIT (OUTPATIENT)
Dept: PRIMARY CARE CLINIC | Age: 53
End: 2023-06-22
Payer: COMMERCIAL

## 2023-06-22 VITALS
SYSTOLIC BLOOD PRESSURE: 110 MMHG | WEIGHT: 182 LBS | DIASTOLIC BLOOD PRESSURE: 62 MMHG | HEART RATE: 68 BPM | TEMPERATURE: 97.8 F | OXYGEN SATURATION: 97 % | BODY MASS INDEX: 34.39 KG/M2

## 2023-06-22 DIAGNOSIS — I10 ESSENTIAL HYPERTENSION: Primary | ICD-10-CM

## 2023-06-22 DIAGNOSIS — R73.03 PRE-DIABETES: ICD-10-CM

## 2023-06-22 DIAGNOSIS — F32.A DEPRESSION, UNSPECIFIED DEPRESSION TYPE: ICD-10-CM

## 2023-06-22 DIAGNOSIS — F41.9 ANXIETY: ICD-10-CM

## 2023-06-22 DIAGNOSIS — R23.2 HOT FLASHES: ICD-10-CM

## 2023-06-22 DIAGNOSIS — E78.2 MIXED HYPERLIPIDEMIA: ICD-10-CM

## 2023-06-22 PROCEDURE — 3074F SYST BP LT 130 MM HG: CPT

## 2023-06-22 PROCEDURE — 99213 OFFICE O/P EST LOW 20 MIN: CPT

## 2023-06-22 PROCEDURE — 3078F DIAST BP <80 MM HG: CPT

## 2023-06-22 RX ORDER — FLUOXETINE 10 MG/1
10 CAPSULE ORAL DAILY
Qty: 90 CAPSULE | Refills: 1 | Status: SHIPPED | OUTPATIENT
Start: 2023-06-22

## 2023-06-22 ASSESSMENT — ENCOUNTER SYMPTOMS
COUGH: 0
VOMITING: 0
GASTROINTESTINAL NEGATIVE: 1
SHORTNESS OF BREATH: 0
NAUSEA: 0

## 2023-06-22 ASSESSMENT — PATIENT HEALTH QUESTIONNAIRE - PHQ9
1. LITTLE INTEREST OR PLEASURE IN DOING THINGS: 1
SUM OF ALL RESPONSES TO PHQ9 QUESTIONS 1 & 2: 2
SUM OF ALL RESPONSES TO PHQ QUESTIONS 1-9: 2
SUM OF ALL RESPONSES TO PHQ QUESTIONS 1-9: 2
2. FEELING DOWN, DEPRESSED OR HOPELESS: 1
SUM OF ALL RESPONSES TO PHQ QUESTIONS 1-9: 2
SUM OF ALL RESPONSES TO PHQ QUESTIONS 1-9: 2

## 2023-06-22 NOTE — PROGRESS NOTES
[address] [facility]  2023    Mohan Briones (:  1970) is a 47 y.o. female, here for evaluation of the following medical concerns:    Chief Complaint   Patient presents with    Depression        ASSESSMENT/ PLAN  1. Essential hypertension  - Lipid Panel  - CBC with Auto Differential  - Comprehensive Metabolic Panel  - I am OK with her stopping the metoprolol. /62 today. Continue amlodipine. 2. Mixed hyperlipidemia  - Lipid Panel  - CBC with Auto Differential  - Comprehensive Metabolic Panel    3. Depression, unspecified depression type  - FLUoxetine (PROZAC) 10 MG capsule; Take 1 capsule by mouth daily  Dispense: 90 capsule; Refill: 1    4. Anxiety  - FLUoxetine (PROZAC) 10 MG capsule; Take 1 capsule by mouth daily  Dispense: 90 capsule; Refill: 1    5. Pre-diabetes  - Hemoglobin A1C  - CBC with Auto Differential  - Comprehensive Metabolic Panel    6. Hot flashes  - Follicle Stimulating Hormone     - Asked that she give me an update in 1-2 weeks after being off her metoprolol. Return in about 6 months (around 2023) for Give me an update in a few weeks regarding your BP and stopping the metoprolol. PHQ Scores 2023 3/29/2023 2022 2020 2020 1/15/2019 2018   PHQ2 Score 2 0 3 0 0 0 0   PHQ9 Score 2 2 13 0 0 0 0     Interpretation of Total Score Depression Severity: 1-4 = Minimal depression, 5-9 = Mild depression, 10-14 = Moderate depression, 15-19 = Moderately severe depression, 20-27 = Severe depression     HPI  Here today for a follow-up visit. She is looking forward to starting her new position as the  next year. Having a good summer. Spending time with her daughters, etc. No plans yet to travel. Trying to be more active. +HTN. Currently taking 2.5mg of amlodipine. Has been tapering down her metoprolol. Was cutting the 25mg XR in half, and then again into quarters. She only has half a tablet left.   Denies any HA,

## 2023-06-23 LAB
ALBUMIN SERPL-MCNC: 4.2 G/DL (ref 3.4–5)
ALBUMIN/GLOB SERPL: 1.6 {RATIO} (ref 1.1–2.2)
ALP SERPL-CCNC: 78 U/L (ref 40–129)
ALT SERPL-CCNC: 26 U/L (ref 10–40)
ANION GAP SERPL CALCULATED.3IONS-SCNC: 14 MMOL/L (ref 3–16)
AST SERPL-CCNC: 21 U/L (ref 15–37)
BASOPHILS # BLD: 0.1 K/UL (ref 0–0.2)
BASOPHILS NFR BLD: 1 %
BILIRUB SERPL-MCNC: 0.3 MG/DL (ref 0–1)
BUN SERPL-MCNC: 15 MG/DL (ref 7–20)
CALCIUM SERPL-MCNC: 9.5 MG/DL (ref 8.3–10.6)
CHLORIDE SERPL-SCNC: 102 MMOL/L (ref 99–110)
CHOLEST SERPL-MCNC: 211 MG/DL (ref 0–199)
CO2 SERPL-SCNC: 20 MMOL/L (ref 21–32)
CREAT SERPL-MCNC: 0.7 MG/DL (ref 0.6–1.1)
DEPRECATED RDW RBC AUTO: 13.4 % (ref 12.4–15.4)
EOSINOPHIL # BLD: 0.3 K/UL (ref 0–0.6)
EOSINOPHIL NFR BLD: 4 %
EST. AVERAGE GLUCOSE BLD GHB EST-MCNC: 131.2 MG/DL
FSH SERPL-ACNC: 40.3 MIU/ML
GFR SERPLBLD CREATININE-BSD FMLA CKD-EPI: >60 ML/MIN/{1.73_M2}
GLUCOSE SERPL-MCNC: 100 MG/DL (ref 70–99)
HBA1C MFR BLD: 6.2 %
HCT VFR BLD AUTO: 45.5 % (ref 36–48)
HDLC SERPL-MCNC: 58 MG/DL (ref 40–60)
HGB BLD-MCNC: 14.8 G/DL (ref 12–16)
LDLC SERPL CALC-MCNC: 118 MG/DL
LYMPHOCYTES # BLD: 3.9 K/UL (ref 1–5.1)
LYMPHOCYTES NFR BLD: 47 %
MCH RBC QN AUTO: 27.8 PG (ref 26–34)
MCHC RBC AUTO-ENTMCNC: 32.6 G/DL (ref 31–36)
MCV RBC AUTO: 85.2 FL (ref 80–100)
MONOCYTES # BLD: 0.6 K/UL (ref 0–1.3)
MONOCYTES NFR BLD: 7 %
NEUTROPHILS # BLD: 3.4 K/UL (ref 1.7–7.7)
NEUTROPHILS NFR BLD: 41 %
PLATELET # BLD AUTO: 256 K/UL (ref 135–450)
PMV BLD AUTO: 9.6 FL (ref 5–10.5)
POTASSIUM SERPL-SCNC: 4.1 MMOL/L (ref 3.5–5.1)
PROT SERPL-MCNC: 6.8 G/DL (ref 6.4–8.2)
RBC # BLD AUTO: 5.34 M/UL (ref 4–5.2)
RBC MORPH BLD: NORMAL
SLIDE REVIEW: ABNORMAL
SODIUM SERPL-SCNC: 136 MMOL/L (ref 136–145)
TRIGL SERPL-MCNC: 175 MG/DL (ref 0–150)
VLDLC SERPL CALC-MCNC: 35 MG/DL
WBC # BLD AUTO: 8.3 K/UL (ref 4–11)

## 2023-06-28 ENCOUNTER — NURSE ONLY (OUTPATIENT)
Dept: PRIMARY CARE CLINIC | Age: 53
End: 2023-06-28

## 2023-06-28 VITALS — DIASTOLIC BLOOD PRESSURE: 78 MMHG | SYSTOLIC BLOOD PRESSURE: 102 MMHG

## 2023-06-28 DIAGNOSIS — I10 BENIGN ESSENTIAL HTN: Primary | ICD-10-CM

## 2023-07-10 ENCOUNTER — TELEMEDICINE (OUTPATIENT)
Dept: PSYCHOLOGY | Age: 53
End: 2023-07-10
Payer: COMMERCIAL

## 2023-07-10 DIAGNOSIS — F41.1 GENERALIZED ANXIETY DISORDER: Primary | ICD-10-CM

## 2023-07-10 PROCEDURE — 90832 PSYTX W PT 30 MINUTES: CPT | Performed by: PSYCHOLOGIST

## 2023-07-17 ENCOUNTER — OFFICE VISIT (OUTPATIENT)
Dept: PRIMARY CARE CLINIC | Age: 53
End: 2023-07-17
Payer: COMMERCIAL

## 2023-07-17 VITALS
WEIGHT: 184 LBS | DIASTOLIC BLOOD PRESSURE: 70 MMHG | BODY MASS INDEX: 34.77 KG/M2 | OXYGEN SATURATION: 98 % | SYSTOLIC BLOOD PRESSURE: 112 MMHG | HEART RATE: 82 BPM | TEMPERATURE: 97.4 F

## 2023-07-17 DIAGNOSIS — E78.2 MIXED HYPERLIPIDEMIA: ICD-10-CM

## 2023-07-17 DIAGNOSIS — R10.11 RUQ ABDOMINAL PAIN: Primary | ICD-10-CM

## 2023-07-17 DIAGNOSIS — F41.9 ANXIETY: ICD-10-CM

## 2023-07-17 DIAGNOSIS — F32.A DEPRESSION, UNSPECIFIED DEPRESSION TYPE: ICD-10-CM

## 2023-07-17 PROCEDURE — 99213 OFFICE O/P EST LOW 20 MIN: CPT

## 2023-07-17 PROCEDURE — 3078F DIAST BP <80 MM HG: CPT

## 2023-07-17 PROCEDURE — 3074F SYST BP LT 130 MM HG: CPT

## 2023-07-17 RX ORDER — ATORVASTATIN CALCIUM 10 MG/1
10 TABLET, FILM COATED ORAL DAILY
Qty: 90 TABLET | Refills: 1 | Status: SHIPPED | OUTPATIENT
Start: 2023-07-17

## 2023-07-17 ASSESSMENT — ENCOUNTER SYMPTOMS
CONSTIPATION: 0
SHORTNESS OF BREATH: 0
DIARRHEA: 0
VOMITING: 0
ABDOMINAL PAIN: 1
NAUSEA: 0
COUGH: 0
ABDOMINAL DISTENTION: 0

## 2023-07-17 NOTE — PROGRESS NOTES
800 Th 2023    Atiya Red (:  1970) is a 47 y.o. female, here for evaluation of the following medical concerns:    Chief Complaint   Patient presents with    Other     Pain under right ribs right side        ASSESSMENT/ PLAN  1. RUQ abdominal pain  - US GALLBLADDER RUQ; Future    2. Mixed hyperlipidemia  - atorvastatin (LIPITOR) 10 MG tablet; Take 1 tablet by mouth daily  Dispense: 90 tablet; Refill: 1     - Supportive care measures discussed, as well as red flag symptoms. Return if symptoms worsen or fail to improve. HPI  Here today with on-going RUQ abdominal pain. Reports a history of gall stones. Apx 2 weekends ago, had considerable RUQ pain. \"Sharp, stabbing. \" Couldn't take a deep breath. Rolled over on her left side in bed and it eventually went away. Cannot recall what she ate the day before. Denies any radiation to her shoulder or back. Has a sensation that something is \"under my ribs. \"   Denies any diarrhea, N/V, etc. No change to bowel habits. Appetite is normal.   Denies fever/chills. Occasional glass of wine (1-2x/month). No routine ETOH use. Got a FitBit last week and is trying to get 10,000 steps/day. Trying to increase her intake of vegetables, etc.      ROS  Review of Systems   Constitutional:  Negative for appetite change, chills, fatigue and fever. HENT: Negative. Respiratory:  Negative for cough and shortness of breath. Cardiovascular:  Negative for chest pain, palpitations and leg swelling. Gastrointestinal:  Positive for abdominal pain (RUQ). Negative for abdominal distention, constipation, diarrhea, nausea and vomiting. Genitourinary: Negative. Musculoskeletal: Negative.       HISTORIES  Current Outpatient Medications on File Prior to Visit   Medication Sig Dispense Refill    FLUoxetine (PROZAC) 10 MG capsule Take 1 capsule by mouth daily 90 capsule 1    amLODIPine (NORVASC) 2.5 MG tablet Take 1 tablet by

## 2023-07-18 RX ORDER — FLUOXETINE 10 MG/1
10 CAPSULE ORAL DAILY
Qty: 90 CAPSULE | Refills: 1 | Status: SHIPPED | OUTPATIENT
Start: 2023-07-18

## 2023-08-09 NOTE — PROGRESS NOTES
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 Key Khan has been walking a lot more (three times per day) since getting a [employer], feels this has been very helpful to her. She is experiencing less joint pain and feels stronger. She is frustrated that she has not noticed a decrease in weight from this. She has cut down fast food and soda intake as well. O:  MSE:  Appearance    alert, cooperative  Appetite normal  Sleep disturbance No  Fatigue No  Loss of pleasure No  Impulsive behavior No  Speech    normal rate and normal volume  Mood    euthymic   Affect    normal affect  Thought Content    intact  Thought Process    linear  Associations    logical connections  Insight    Good  Judgment    Intact  Orientation    oriented to person, place, time, and general circumstances  Memory    recent and remote memory intact  Attention/Concentration    intact  Morbid ideation No  Suicide Assessment    no suicidal ideation    A:  Key Khan presents for a follow up [facility] appointment regarding concerns related to anxiety. These symptoms are are improving. Safety concerns reported include: none. Diagnosis:  1. Generalized anxiety disorder        Plan:  Pt interventions:  Reviewed progress and provided praise for effective coping. Discussed expectancies/concerns about upcoming school year using CBT strategies. Discussed lifestyle changes and mindful eating strategies, how to continue this with new routine.     Pt Behavioral Change Plan:   See above

## 2023-08-10 ENCOUNTER — TELEMEDICINE (OUTPATIENT)
Dept: PSYCHOLOGY | Age: 53
End: 2023-08-10
Payer: COMMERCIAL

## 2023-08-10 DIAGNOSIS — F41.1 GENERALIZED ANXIETY DISORDER: Primary | ICD-10-CM

## 2023-08-10 PROCEDURE — 90832 PSYTX W PT 30 MINUTES: CPT | Performed by: PSYCHOLOGIST

## 2023-09-10 DIAGNOSIS — E78.2 MIXED HYPERLIPIDEMIA: ICD-10-CM

## 2023-09-11 RX ORDER — ATORVASTATIN CALCIUM 10 MG/1
10 TABLET, FILM COATED ORAL DAILY
Qty: 90 TABLET | Refills: 1 | OUTPATIENT
Start: 2023-09-11

## 2023-09-14 ENCOUNTER — TELEMEDICINE (OUTPATIENT)
Dept: PSYCHOLOGY | Age: 53
End: 2023-09-14
Payer: COMMERCIAL

## 2023-09-14 DIAGNOSIS — F41.1 GENERALIZED ANXIETY DISORDER: Primary | ICD-10-CM

## 2023-09-14 PROCEDURE — 90832 PSYTX W PT 30 MINUTES: CPT | Performed by: PSYCHOLOGIST

## 2023-09-14 NOTE — PROGRESS NOTES
Provider location: [address]:  ST. BETHANY MARTINEZ reports that her daughter had surgery yesterday. ST. 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 She acknowledges that this small percentage of students are making it difficult to enjoy the rest of the experience. ST. BETHANY MARTINEZ has not had any difficulty with absenteeism or temptation to skip work. O:  MSE:  Appearance    alert, cooperative  Appetite normal  Sleep disturbance No  Fatigue No  Loss of pleasure No  Impulsive behavior No  Speech    normal rate and normal volume  Mood    euthymic   Affect    normal affect  Thought Content    intact  Thought Process    linear  Associations    logical connections  Insight    Good  Judgment    Intact  Orientation    oriented to person, place, time, and general circumstances  Memory    recent and remote memory intact  Attention/Concentration    intact  Morbid ideation No  Suicide Assessment    no suicidal ideation    A:  ST. BETHANY MARTINEZ presents for a follow up [facility] appointment regarding concerns related to anxiety. These symptoms are are improving. Safety concerns reported include: none. Diagnosis:  1. Generalized anxiety disorder        Plan:  Pt interventions:  Reviewed progress and provided praise for effective coping. Discussed thoughts about students behavior using CBT strategies, encouraged her to continue using these.       Pt Behavioral Change Plan:   See above

## 2023-10-02 ENCOUNTER — TELEMEDICINE (OUTPATIENT)
Dept: PSYCHOLOGY | Age: 53
End: 2023-10-02
Payer: COMMERCIAL

## 2023-10-02 DIAGNOSIS — F41.1 GENERALIZED ANXIETY DISORDER: Primary | ICD-10-CM

## 2023-10-02 PROCEDURE — 90832 PSYTX W PT 30 MINUTES: CPT | Performed by: PSYCHOLOGIST

## 2023-10-02 NOTE — PROGRESS NOTES
Behavioral Health Consultation  Nellie Weathers Psy.D. Psychologist  10/2/2023      Time spent with Patient: 25 minutes  This is patient's  19th  [facility] appointment. Reason for Consult:    Chief Complaint   Patient presents with    Anxiety     Referring Provider: THAO Garcia - ELIZA GRIER,  [address]    Feedback given to PCP. TELEHEALTH VISIT -- Audio/Visual   Tor Javy, was evaluated through a synchronous (real-time) audio-video encounter. The patient (or guardian if applicable) is aware that this is a billable service, which includes applicable co-pays. This Virtual Visit was conducted with patient's (and/or legal guardian's) consent. Patient identification was verified, and a caregiver was present when appropriate. The patient was located in a state where the provider was licensed to provide care. Conducted a risk-benefit analysis and determined that the patient's presenting problems are consistent with the use of telepsychology. Determined that the patient and/or guardian has sufficient knowledge and skills in the use of technology enabling them to adequately benefit from telepsychology. It was determined that this patient was able to be properly treated without an in-person session. Patient or guardian verified that they were currently located at the [place] address that was provided during registration. Discussed consent form for telehealth and patient or guardian provided verbal consent.     Verified the following information:  Patient's identification: Yes  Patient location: [address] [place] [address]   Patient's call back number: [phone]   Patient's emergency contact's name and number, as well as permission to contact them if needed: Extended Emergency Contact Information  Primary Emergency Contact: Naeem Guerra  Address: [address] [place] [address] [facility] [address] [address] Phone: [phone]  Relation: Spouse

## 2023-10-30 ENCOUNTER — TELEMEDICINE (OUTPATIENT)
Dept: PSYCHOLOGY | Age: 53
End: 2023-10-30
Payer: COMMERCIAL

## 2023-10-30 DIAGNOSIS — F41.1 GENERALIZED ANXIETY DISORDER: Primary | ICD-10-CM

## 2023-10-30 PROCEDURE — 90832 PSYTX W PT 30 MINUTES: CPT | Performed by: PSYCHOLOGIST

## 2023-10-30 NOTE — PROGRESS NOTES
Behavioral Health Consultation  Cristina Bhatt Psy.D. Psychologist  10/30/2023      Time spent with Patient: 25 minutes  This is patient's  20th  [facility] appointment. Reason for Consult:    Chief Complaint   Patient presents with    Anxiety     Referring Provider: THAO Morgan - CNP  [place]  MANSOORKIMBERLY,  [address]    Feedback given to PCP. TELEHEALTH VISIT -- Audio/Visual   Reg Keller, was evaluated through a synchronous (real-time) audio-video encounter. The patient (or guardian if applicable) is aware that this is a billable service, which includes applicable co-pays. This Virtual Visit was conducted with patient's (and/or legal guardian's) consent. Patient identification was verified, and a caregiver was present when appropriate. The patient was located in a state where the provider was licensed to provide care. Conducted a risk-benefit analysis and determined that the patient's presenting problems are consistent with the use of telepsychology. Determined that the patient and/or guardian has sufficient knowledge and skills in the use of technology enabling them to adequately benefit from telepsychology. It was determined that this patient was able to be properly treated without an in-person session. Patient or guardian verified that they were currently located at the [place] address that was provided during registration. Discussed consent form for telehealth and patient or guardian provided verbal consent.     Verified the following information:  Patient's identification: Yes  Patient location: [facility]  Patient's call back number: [phone]   Patient's emergency contact's name and number, as well as permission to contact them if needed: Extended Emergency Contact Information  Primary Emergency Contact: Naeem Guerra  Address: [address] [place] [address] of [address] [address] Phone: [phone]  Relation: Spouse     Provider location: [facility], [place]

## 2023-11-17 ENCOUNTER — COMMUNITY OUTREACH (OUTPATIENT)
Dept: PRIMARY CARE CLINIC | Age: 53
End: 2023-11-17

## 2023-11-27 DIAGNOSIS — F32.A DEPRESSION, UNSPECIFIED DEPRESSION TYPE: ICD-10-CM

## 2023-11-27 DIAGNOSIS — F41.9 ANXIETY: ICD-10-CM

## 2023-11-27 RX ORDER — FLUOXETINE 10 MG/1
10 CAPSULE ORAL DAILY
Qty: 90 CAPSULE | Refills: 1 | Status: SHIPPED | OUTPATIENT
Start: 2023-11-27

## 2023-11-28 ENCOUNTER — OFFICE VISIT (OUTPATIENT)
Dept: PRIMARY CARE CLINIC | Age: 53
End: 2023-11-28

## 2023-11-28 VITALS
WEIGHT: 175 LBS | BODY MASS INDEX: 33.07 KG/M2 | SYSTOLIC BLOOD PRESSURE: 128 MMHG | HEART RATE: 94 BPM | TEMPERATURE: 98 F | OXYGEN SATURATION: 97 % | DIASTOLIC BLOOD PRESSURE: 78 MMHG | RESPIRATION RATE: 14 BRPM

## 2023-11-28 DIAGNOSIS — Z79.899 LONG-TERM CURRENT USE OF PROTON PUMP INHIBITOR THERAPY: ICD-10-CM

## 2023-11-28 DIAGNOSIS — R73.03 PRE-DIABETES: ICD-10-CM

## 2023-11-28 DIAGNOSIS — Z23 FLU VACCINE NEED: ICD-10-CM

## 2023-11-28 DIAGNOSIS — I10 ESSENTIAL HYPERTENSION: Primary | ICD-10-CM

## 2023-11-28 PROCEDURE — 3074F SYST BP LT 130 MM HG: CPT

## 2023-11-28 PROCEDURE — 90471 IMMUNIZATION ADMIN: CPT

## 2023-11-28 PROCEDURE — 3078F DIAST BP <80 MM HG: CPT

## 2023-11-28 PROCEDURE — 90674 CCIIV4 VAC NO PRSV 0.5 ML IM: CPT

## 2023-11-28 PROCEDURE — 99213 OFFICE O/P EST LOW 20 MIN: CPT

## 2023-11-28 ASSESSMENT — ENCOUNTER SYMPTOMS
GASTROINTESTINAL NEGATIVE: 1
SINUS PRESSURE: 0
SINUS PAIN: 0
SHORTNESS OF BREATH: 0
COUGH: 0

## 2023-12-04 RX ORDER — AMLODIPINE BESYLATE 2.5 MG/1
2.5 TABLET ORAL DAILY
Qty: 90 TABLET | Refills: 1 | Status: SHIPPED | OUTPATIENT
Start: 2023-12-04

## 2023-12-11 NOTE — PROGRESS NOTES
Behavioral Health Consultation  Shonda Menezes Psy.D. Psychologist  12/12/2023      Time spent with Patient: 25 minutes  This is patient's  21st  [facility] appointment. Reason for Consult:    Chief Complaint   Patient presents with    Anxiety     Referring Provider: Abelardo Rod, APRN - CNP  [place]  CHERRIE,  [address]    Feedback given to PCP. TELEHEALTH VISIT -- Audio/Visual   Gaile Buffalo, was evaluated through a synchronous (real-time) audio-video encounter. The patient (or guardian if applicable) is aware that this is a billable service, which includes applicable co-pays. This Virtual Visit was conducted with patient's (and/or legal guardian's) consent. Patient identification was verified, and a caregiver was present when appropriate. The patient was located in a state where the provider was licensed to provide care. Conducted a risk-benefit analysis and determined that the patient's presenting problems are consistent with the use of telepsychology. Determined that the patient and/or guardian has sufficient knowledge and skills in the use of technology enabling them to adequately benefit from telepsychology. It was determined that this patient was able to be properly treated without an in-person session. Patient or guardian verified that they were currently located at the [place] address that was provided during registration. Discussed consent form for telehealth and patient or guardian provided verbal consent.     Verified the following information:  Patient's identification: Yes  Patient location: [facility]  Patient's call back number: [phone]   Patient's emergency contact's name and number, as well as permission to contact them if needed: Extended Emergency Contact Information  Primary Emergency Contact: Naeem Guerra  Address: [address] [place] [address] of [address] [address] Phone: [phone]  Relation: Spouse

## 2023-12-12 ENCOUNTER — TELEMEDICINE (OUTPATIENT)
Dept: PSYCHOLOGY | Age: 53
End: 2023-12-12
Payer: COMMERCIAL

## 2023-12-12 DIAGNOSIS — F41.1 GENERALIZED ANXIETY DISORDER: Primary | ICD-10-CM

## 2023-12-12 PROCEDURE — 90832 PSYTX W PT 30 MINUTES: CPT | Performed by: PSYCHOLOGIST

## 2023-12-15 ENCOUNTER — OFFICE VISIT (OUTPATIENT)
Age: 53
End: 2023-12-15

## 2023-12-15 VITALS
SYSTOLIC BLOOD PRESSURE: 145 MMHG | BODY MASS INDEX: 33.91 KG/M2 | TEMPERATURE: 97.6 F | OXYGEN SATURATION: 98 % | HEIGHT: 61 IN | HEART RATE: 73 BPM | DIASTOLIC BLOOD PRESSURE: 92 MMHG | WEIGHT: 179.6 LBS

## 2023-12-15 DIAGNOSIS — B96.89 BACTERIAL URI: Primary | ICD-10-CM

## 2023-12-15 DIAGNOSIS — J04.0 LARYNGITIS: ICD-10-CM

## 2023-12-15 DIAGNOSIS — J06.9 BACTERIAL URI: Primary | ICD-10-CM

## 2023-12-15 DIAGNOSIS — I10 ESSENTIAL HYPERTENSION: ICD-10-CM

## 2023-12-15 RX ORDER — AZITHROMYCIN 250 MG/1
250 TABLET, FILM COATED ORAL SEE ADMIN INSTRUCTIONS
Qty: 6 TABLET | Refills: 0 | Status: SHIPPED | OUTPATIENT
Start: 2023-12-15 | End: 2023-12-20

## 2023-12-15 RX ORDER — METHYLPREDNISOLONE 4 MG/1
TABLET ORAL
Qty: 1 KIT | Refills: 0 | Status: SHIPPED | OUTPATIENT
Start: 2023-12-15 | End: 2023-12-21

## 2023-12-15 NOTE — PATIENT INSTRUCTIONS
Take medicaton as prescribed. Reviewed increasing water intake, sleeping in an elevated position to aid post nasal drip, using a cool mist humidifier,covering cough and proper hand hygiene. 9 days of symptoms with not much change, will treat with abx at this time. Follow up with your pcp in 7 days if symptoms persist or if symptoms worsen. New Prescriptions    AZITHROMYCIN (ZITHROMAX) 250 MG TABLET    Take 1 tablet by mouth See Admin Instructions for 5 days 500mg on day 1 followed by 250mg on days 2 - 5    METHYLPREDNISOLONE (MEDROL DOSEPACK) 4 MG TABLET    Take by mouth.

## 2023-12-27 NOTE — PROGRESS NOTES
Enoc Viera (:  1970) is a 45 y.o. female,Established patient, here for evaluation of the following chief complaint(s):  Hypertension         ASSESSMENT/PLAN:  1. Anxiety  -     External Referral To Psychology  -     vilazodone HCl (VILAZODONE HCL) 10 MG TABS; Take 1 tablet by mouth daily, Disp-7 tablet, R-0Normal  -     vilazodone HCl (VILAZODONE HCL) 20 MG TABS; Take 1 tablet by mouth daily Begin on day 8, after 7 days on 10 mg tablets, Disp-30 tablet, R-0Normal  2. Depression, unspecified depression type  -     External Referral To Psychology  -     vilazodone HCl (VILAZODONE HCL) 10 MG TABS; Take 1 tablet by mouth daily, Disp-7 tablet, R-0Normal  -     vilazodone HCl (VILAZODONE HCL) 20 MG TABS; Take 1 tablet by mouth daily Begin on day 8, after 7 days on 10 mg tablets, Disp-30 tablet, R-0Normal      Return in about 4 weeks (around 2022) for VV with this provider. Subjective   SUBJECTIVE/OBJECTIVE:  Elevated BP readings at home  Increase stress- daughter returned to college this past weekend  She has a cardiologist. She has an appointment with them 2022  She took bp over the weekend. Multiple readings: 150/101 at home, tried to get into urgent cares for care which was too busy   with her today. Very supportive. Hypertension  This is a chronic problem. The current episode started more than 1 year ago. The problem has been waxing and waning since onset. Associated symptoms include anxiety, headaches, neck pain and palpitations. Pertinent negatives include no blurred vision, chest pain, malaise/fatigue, orthopnea, peripheral edema, PND, shortness of breath or sweats. Review of Systems   Constitutional: Positive for activity change. Negative for malaise/fatigue. Eyes: Negative for blurred vision. Respiratory: Negative for shortness of breath. Cardiovascular: Positive for palpitations. Negative for chest pain, orthopnea and PND.    Musculoskeletal: Positive for neck pain. Neurological: Positive for headaches. Psychiatric/Behavioral: Positive for dysphoric mood. Negative for suicidal ideas. The patient is nervous/anxious. Objective   Physical Exam  Vitals reviewed. Constitutional:       Appearance: She is not ill-appearing. HENT:      Head: Normocephalic. Right Ear: External ear normal.      Left Ear: External ear normal.      Nose: Nose normal.      Mouth/Throat:      Mouth: Mucous membranes are moist.   Eyes:      Extraocular Movements: Extraocular movements intact. Conjunctiva/sclera: Conjunctivae normal.      Pupils: Pupils are equal, round, and reactive to light. Cardiovascular:      Rate and Rhythm: Normal rate. Pulmonary:      Effort: Pulmonary effort is normal.   Musculoskeletal:         General: Normal range of motion. Cervical back: Normal range of motion. Skin:     General: Skin is warm. Neurological:      General: No focal deficit present. Mental Status: She is alert and oriented to person, place, and time. Psychiatric:         Attention and Perception: Attention normal.         Mood and Affect: Mood is depressed. Affect is tearful. Speech: Speech normal.         Behavior: Behavior normal.         Thought Content: Thought content normal.         Cognition and Memory: Cognition normal.         Judgment: Judgment normal.                An electronic signature was used to authenticate this note.     --Hannah Chapman, APRN - CNP no

## 2024-01-17 ENCOUNTER — TELEMEDICINE (OUTPATIENT)
Dept: PSYCHOLOGY | Age: 54
End: 2024-01-17
Payer: COMMERCIAL

## 2024-01-17 DIAGNOSIS — F41.1 GENERALIZED ANXIETY DISORDER: Primary | ICD-10-CM

## 2024-01-17 PROCEDURE — 90832 PSYTX W PT 30 MINUTES: CPT | Performed by: PSYCHOLOGIST

## 2024-01-17 NOTE — PROGRESS NOTES
Behavioral Health Consultation  Nettie Gautam Psy.D.  Psychologist  1/17/2024      Time spent with Patient: 25 minutes  This is patient's  22nd  [facility] appointment.    Reason for Consult:    Chief Complaint   Patient presents with    Anxiety     Referring Provider: Trudy Moody, THAO - CNP  [address] [place] [address]    Feedback given to referring provider.    TELEHEALTH VISIT -- Audio/Visual  Celina Guerra, was evaluated through a synchronous (real-time) audio-video encounter. The patient (or guardian if applicable) is aware that this is a billable service, which includes applicable co-pays. This Virtual Visit was conducted with patient's (and/or legal guardian's) consent.  Patient identification was verified, and a caregiver was present when appropriate. The patient was located in a state where the provider was licensed to provide care.     Conducted a risk-benefit analysis and determined that the patient's presenting problems are consistent with the use of telepsychology. Determined that the patient and/or guardian has sufficient knowledge and skills in the use of technology enabling them to adequately benefit from telepsychology. It was determined that this patient was able to be properly treated without an in-person session. Patient or guardian verified that they were currently located at the [place] address that was provided during registration. Discussed consent form for telehealth and patient or guardian provided verbal consent.    Verified the following information:  Patient's identification: Yes  Patient location: [facility]  Patient's call back number: [phone]   Patient's emergency contact's name and number, as well as permission to contact them if needed: Extended Emergency Contact Information  Primary Emergency Contact: Naeem Guerra  Address: [address]  Home Phone: [phone]  Relation: Spouse     Provider location:

## 2024-02-07 ENCOUNTER — TELEMEDICINE (OUTPATIENT)
Dept: PSYCHOLOGY | Age: 54
End: 2024-02-07
Payer: COMMERCIAL

## 2024-02-07 DIAGNOSIS — F41.1 GENERALIZED ANXIETY DISORDER: Primary | ICD-10-CM

## 2024-02-07 PROCEDURE — 90832 PSYTX W PT 30 MINUTES: CPT | Performed by: PSYCHOLOGIST

## 2024-02-07 NOTE — PROGRESS NOTES
Behavioral Health Consultation  Nettie Gautam Psy.D.  Psychologist  2/7/2024      Time spent with Patient: 25 minutes  This is patient's  23rd  [facility] appointment.    Reason for Consult:    Chief Complaint   Patient presents with    Anxiety     Referring Provider: Trudy Moody, THAO - CNP  [address] [place] [address]    Feedback given to referring provider.    TELEHEALTH VISIT -- Audio/Visual  Celina Guerra, was evaluated through a synchronous (real-time) audio-video encounter. The patient (or guardian if applicable) is aware that this is a billable service, which includes applicable co-pays. This Virtual Visit was conducted with patient's (and/or legal guardian's) consent.  Patient identification was verified, and a caregiver was present when appropriate. The patient was located in a state where the provider was licensed to provide care.     Conducted a risk-benefit analysis and determined that the patient's presenting problems are consistent with the use of telepsychology. Determined that the patient and/or guardian has sufficient knowledge and skills in the use of technology enabling them to adequately benefit from telepsychology. It was determined that this patient was able to be properly treated without an in-person session. Patient or guardian verified that they were currently located at the [place] address that was provided during registration. Discussed consent form for telehealth and patient or guardian provided verbal consent.    Verified the following information:  Patient's identification: Yes  Patient location: [address] [place] [address]   Patient's call back number: [phone]   Patient's emergency contact's name and number, as well as permission to contact them if needed: Extended Emergency Contact Information  Primary Emergency Contact: Naeem Guerra  Address: [address] [place] [address] of [place]  Home Phone:

## 2024-02-12 DIAGNOSIS — E78.2 MIXED HYPERLIPIDEMIA: ICD-10-CM

## 2024-02-12 RX ORDER — ATORVASTATIN CALCIUM 10 MG/1
10 TABLET, FILM COATED ORAL DAILY
Qty: 90 TABLET | Refills: 1 | Status: SHIPPED | OUTPATIENT
Start: 2024-02-12

## 2024-03-01 ENCOUNTER — OFFICE VISIT (OUTPATIENT)
Dept: PRIMARY CARE CLINIC | Age: 54
End: 2024-03-01
Payer: COMMERCIAL

## 2024-03-01 VITALS
DIASTOLIC BLOOD PRESSURE: 74 MMHG | HEART RATE: 80 BPM | OXYGEN SATURATION: 99 % | SYSTOLIC BLOOD PRESSURE: 118 MMHG | TEMPERATURE: 97.8 F

## 2024-03-01 DIAGNOSIS — J02.9 PHARYNGITIS, UNSPECIFIED ETIOLOGY: ICD-10-CM

## 2024-03-01 DIAGNOSIS — J02.9 ACUTE VIRAL PHARYNGITIS: Primary | ICD-10-CM

## 2024-03-01 DIAGNOSIS — R09.82 POST-NASAL DRAINAGE: ICD-10-CM

## 2024-03-01 LAB — S PYO AG THROAT QL: NORMAL

## 2024-03-01 PROCEDURE — 3078F DIAST BP <80 MM HG: CPT

## 2024-03-01 PROCEDURE — 99213 OFFICE O/P EST LOW 20 MIN: CPT

## 2024-03-01 PROCEDURE — 87880 STREP A ASSAY W/OPTIC: CPT

## 2024-03-01 PROCEDURE — 3074F SYST BP LT 130 MM HG: CPT

## 2024-03-01 ASSESSMENT — ENCOUNTER SYMPTOMS
VOMITING: 0
COUGH: 1
TROUBLE SWALLOWING: 0
SHORTNESS OF BREATH: 0
SORE THROAT: 1
SINUS PAIN: 0
EYE REDNESS: 0
NAUSEA: 0
SINUS PRESSURE: 0
RHINORRHEA: 0

## 2024-03-01 NOTE — PROGRESS NOTES
exudate or uvula swelling.      Comments: Tonsils surgically absent  Eyes:      Extraocular Movements: Extraocular movements intact.      Conjunctiva/sclera: Conjunctivae normal.      Pupils: Pupils are equal, round, and reactive to light.   Cardiovascular:      Rate and Rhythm: Normal rate and regular rhythm.      Pulses: Normal pulses.      Heart sounds: Normal heart sounds.   Pulmonary:      Effort: Pulmonary effort is normal.      Breath sounds: Normal breath sounds.   Abdominal:      General: Abdomen is flat. Bowel sounds are normal.      Palpations: Abdomen is soft.   Musculoskeletal:         General: Normal range of motion.      Cervical back: Normal range of motion and neck supple.   Lymphadenopathy:      Head:      Right side of head: No tonsillar adenopathy.      Left side of head: No tonsillar adenopathy.      Cervical: No cervical adenopathy.   Skin:     General: Skin is warm and dry.      Capillary Refill: Capillary refill takes less than 2 seconds.   Neurological:      General: No focal deficit present.      Mental Status: She is alert.   Psychiatric:         Mood and Affect: Mood normal.         Behavior: Behavior normal.         Trudy Moody, APRN - CNP

## 2024-03-01 NOTE — PATIENT INSTRUCTIONS
Flonase nasal spray to help with the post nasal drainage    Gargle with warm, salt water    Tylenol and/or ibuprofen as needed

## 2024-03-06 ENCOUNTER — TELEMEDICINE (OUTPATIENT)
Dept: PSYCHOLOGY | Age: 54
End: 2024-03-06
Payer: COMMERCIAL

## 2024-03-06 DIAGNOSIS — F41.1 GENERALIZED ANXIETY DISORDER: Primary | ICD-10-CM

## 2024-03-06 PROCEDURE — 90832 PSYTX W PT 30 MINUTES: CPT | Performed by: PSYCHOLOGIST

## 2024-03-06 NOTE — PROGRESS NOTES
Behavioral Health Consultation  Nettie Gautam Psy.D.  Psychologist  3/6/2024      Time spent with Patient: 25 minutes  This is patient's  24th  [facility] appointment.    Reason for Consult:    Chief Complaint   Patient presents with    Anxiety     Referring Provider: Trudy Moody, THAO - CNP  [address] [place] [address]    Feedback given to referring provider.    TELEHEALTH VISIT -- Audio/Visual  Celina Guerra, was evaluated through a synchronous (real-time) audio-video encounter. The patient (or guardian if applicable) is aware that this is a billable service, which includes applicable co-pays. This Virtual Visit was conducted with patient's (and/or legal guardian's) consent.  Patient identification was verified, and a caregiver was present when appropriate. The patient was located in a state where the provider was licensed to provide care.     Conducted a risk-benefit analysis and determined that the patient's presenting problems are consistent with the use of telepsychology. Determined that the patient and/or guardian has sufficient knowledge and skills in the use of technology enabling them to adequately benefit from telepsychology. It was determined that this patient was able to be properly treated without an in-person session. Patient or guardian verified that they were currently located at the [place] address that was provided during registration. Discussed consent form for telehealth and patient or guardian provided verbal consent.    Verified the following information:  Patient's identification: Yes  Patient location: [address] [place] [address]   Patient's call back number: [phone]   Patient's emergency contact's name and number, as well as permission to contact them if needed: Extended Emergency Contact Information  Primary Emergency Contact: Naeem Guerra  Address: [address] [place] [address] of [place]  Home Phone:

## 2024-04-03 ENCOUNTER — TELEMEDICINE (OUTPATIENT)
Dept: PSYCHOLOGY | Age: 54
End: 2024-04-03

## 2024-04-03 DIAGNOSIS — F41.1 GENERALIZED ANXIETY DISORDER: Primary | ICD-10-CM

## 2024-04-03 PROCEDURE — 90832 PSYTX W PT 30 MINUTES: CPT | Performed by: PSYCHOLOGIST

## 2024-04-03 NOTE — PROGRESS NOTES
Behavioral Health Consultation  Nettie Gautam Psy.D.  Psychologist  4/3/2024      Time spent with Patient: 25 minutes  This is patient's  25th  [facility] appointment.    Reason for Consult:    Chief Complaint   Patient presents with    Anxiety     Referring Provider: Trudy Moody APRN - CNP  [address] [place] [address]    Feedback given to referring provider.    TELEHEALTH VISIT -- Audio/Visual  Celina Guerra, was evaluated through a synchronous (real-time) audio-video encounter. The patient (or guardian if applicable) is aware that this is a billable service, which includes applicable co-pays. This Virtual Visit was conducted with patient's (and/or legal guardian's) consent.  Patient identification was verified, and a caregiver was present when appropriate. The patient was located in a state where the provider was licensed to provide care.     Conducted a risk-benefit analysis and determined that the patient's presenting problems are consistent with the use of telepsychology. Determined that the patient and/or guardian has sufficient knowledge and skills in the use of technology enabling them to adequately benefit from telepsychology. It was determined that this patient was able to be properly treated without an in-person session. Patient or guardian verified that they were currently located at the [place] address that was provided during registration. Discussed consent form for telehealth and patient or guardian provided verbal consent.    Verified the following information:  Patient's identification: Yes  Patient location: [facility]   Patient's call back number: [phone]   Patient's emergency contact's name and number, as well as permission to contact them if needed: Extended Emergency Contact Information  Primary Emergency Contact: Naeme Guerra  Address: [address]  Home Phone: [phone]  Relation: Spouse     Provider location:

## 2024-04-11 RX ORDER — AMLODIPINE BESYLATE 2.5 MG/1
2.5 TABLET ORAL DAILY
Qty: 90 TABLET | Refills: 1 | Status: SHIPPED | OUTPATIENT
Start: 2024-04-11

## 2024-05-01 ENCOUNTER — TELEMEDICINE (OUTPATIENT)
Dept: PSYCHOLOGY | Age: 54
End: 2024-05-01
Payer: COMMERCIAL

## 2024-05-01 DIAGNOSIS — F41.1 GENERALIZED ANXIETY DISORDER: Primary | ICD-10-CM

## 2024-05-01 PROCEDURE — 90832 PSYTX W PT 30 MINUTES: CPT | Performed by: PSYCHOLOGIST

## 2024-05-01 NOTE — PROGRESS NOTES
Behavioral Health Consultation  Nettie Gautam Psy.D.  Psychologist  5/1/2024      Time spent with Patient: 25 minutes  This is patient's  26th  [facility] appointment.    Reason for Consult:    Chief Complaint   Patient presents with    Anxiety     Referring Provider: Trudy Moody, THAO - CNP  [address] [place] [address]    Feedback given to referring provider.    TELEHEALTH VISIT -- Audio/Visual  Celina Guerra, was evaluated through a synchronous (real-time) audio-video encounter. The patient (or guardian if applicable) is aware that this is a billable service, which includes applicable co-pays. This Virtual Visit was conducted with patient's (and/or legal guardian's) consent.  Patient identification was verified, and a caregiver was present when appropriate. The patient was located in a state where the provider was licensed to provide care.     Conducted a risk-benefit analysis and determined that the patient's presenting problems are consistent with the use of telepsychology. Determined that the patient and/or guardian has sufficient knowledge and skills in the use of technology enabling them to adequately benefit from telepsychology. It was determined that this patient was able to be properly treated without an in-person session. Patient or guardian verified that they were currently located at the [place] address that was provided during registration. Discussed consent form for telehealth and patient or guardian provided verbal consent.    Verified the following information:  Patient's identification: Yes  Patient location:  [facility]  Patient's call back number: [phone]   Patient's emergency contact's name and number, as well as permission to contact them if needed: Extended Emergency Contact Information  Primary Emergency Contact: Naeem Guerra  Address: [address]  Home Phone: [phone]  Relation: Spouse     Provider location:

## 2024-05-21 DIAGNOSIS — F32.A DEPRESSION, UNSPECIFIED DEPRESSION TYPE: ICD-10-CM

## 2024-05-21 DIAGNOSIS — F41.9 ANXIETY: ICD-10-CM

## 2024-05-21 RX ORDER — FLUOXETINE 10 MG/1
10 CAPSULE ORAL DAILY
Qty: 90 CAPSULE | Refills: 1 | Status: SHIPPED | OUTPATIENT
Start: 2024-05-21

## 2024-05-21 NOTE — TELEPHONE ENCOUNTER
Last seen 3/1/24 for a sick visit, last seen for chronic issues on 11/28/23  Last filled on 11/27/23

## 2024-05-28 NOTE — PROGRESS NOTES
Behavioral Health Consultation  Nettie Gautam Psy.D.  Psychologist  5/29/2024      Time spent with Patient: 25 minutes  This is patient's  27th  [facility] appointment.    Reason for Consult:    Chief Complaint   Patient presents with    Anxiety     Referring Provider: Trudy Moody, THAO - CNP  [address] [place] [address]    Feedback given to referring provider.    TELEHEALTH VISIT -- Audio/Visual  Celina Guerra, was evaluated through a synchronous (real-time) audio-video encounter. The patient (or guardian if applicable) is aware that this is a billable service, which includes applicable co-pays. This Virtual Visit was conducted with patient's (and/or legal guardian's) consent.  Patient identification was verified, and a caregiver was present when appropriate. The patient was located in a state where the provider was licensed to provide care.     Conducted a risk-benefit analysis and determined that the patient's presenting problems are consistent with the use of telepsychology. Determined that the patient and/or guardian has sufficient knowledge and skills in the use of technology enabling them to adequately benefit from telepsychology. It was determined that this patient was able to be properly treated without an in-person session. Patient or guardian verified that they were currently located at the [place] address that was provided during registration. Discussed consent form for telehealth and patient or guardian provided verbal consent.    Verified the following information:  Patient's identification: Yes  Patient location: [address] [place] [address]   Patient's call back number: [phone]   Patient's emergency contact's name and number, as well as permission to contact them if needed: Extended Emergency Contact Information  Primary Emergency Contact: Naeem Guerra  Address: [address] [place] [address] of [place]  Home Phone:

## 2024-05-29 ENCOUNTER — TELEMEDICINE (OUTPATIENT)
Dept: PSYCHOLOGY | Age: 54
End: 2024-05-29
Payer: COMMERCIAL

## 2024-05-29 DIAGNOSIS — F41.1 GENERALIZED ANXIETY DISORDER: Primary | ICD-10-CM

## 2024-05-29 PROCEDURE — 90832 PSYTX W PT 30 MINUTES: CPT | Performed by: PSYCHOLOGIST

## 2024-06-13 ENCOUNTER — OFFICE VISIT (OUTPATIENT)
Dept: PRIMARY CARE CLINIC | Age: 54
End: 2024-06-13
Payer: COMMERCIAL

## 2024-06-13 VITALS
BODY MASS INDEX: 34.16 KG/M2 | SYSTOLIC BLOOD PRESSURE: 122 MMHG | DIASTOLIC BLOOD PRESSURE: 70 MMHG | HEART RATE: 64 BPM | RESPIRATION RATE: 14 BRPM | WEIGHT: 180.8 LBS | OXYGEN SATURATION: 98 % | TEMPERATURE: 97.8 F

## 2024-06-13 DIAGNOSIS — Z12.12 SCREENING FOR COLORECTAL CANCER: ICD-10-CM

## 2024-06-13 DIAGNOSIS — F41.9 ANXIETY: ICD-10-CM

## 2024-06-13 DIAGNOSIS — Z71.89 ACP (ADVANCE CARE PLANNING): ICD-10-CM

## 2024-06-13 DIAGNOSIS — R73.03 PRE-DIABETES: ICD-10-CM

## 2024-06-13 DIAGNOSIS — R23.2 HOT FLASHES: ICD-10-CM

## 2024-06-13 DIAGNOSIS — Z00.00 ENCOUNTER FOR WELL ADULT EXAM WITHOUT ABNORMAL FINDINGS: Primary | ICD-10-CM

## 2024-06-13 DIAGNOSIS — E55.9 VITAMIN D DEFICIENCY: ICD-10-CM

## 2024-06-13 DIAGNOSIS — Z12.11 SCREENING FOR COLORECTAL CANCER: ICD-10-CM

## 2024-06-13 DIAGNOSIS — I10 ESSENTIAL HYPERTENSION: ICD-10-CM

## 2024-06-13 DIAGNOSIS — E78.2 MIXED HYPERLIPIDEMIA: ICD-10-CM

## 2024-06-13 DIAGNOSIS — Z79.899 LONG-TERM CURRENT USE OF PROTON PUMP INHIBITOR THERAPY: ICD-10-CM

## 2024-06-13 PROBLEM — M67.471 GANGLION CYST OF RIGHT FOOT: Status: RESOLVED | Noted: 2017-02-23 | Resolved: 2024-06-13

## 2024-06-13 PROBLEM — M21.611 BUNION OF RIGHT FOOT: Status: RESOLVED | Noted: 2017-02-23 | Resolved: 2024-06-13

## 2024-06-13 PROCEDURE — 3078F DIAST BP <80 MM HG: CPT

## 2024-06-13 PROCEDURE — 3074F SYST BP LT 130 MM HG: CPT

## 2024-06-13 PROCEDURE — 99396 PREV VISIT EST AGE 40-64: CPT

## 2024-06-13 PROCEDURE — 36415 COLL VENOUS BLD VENIPUNCTURE: CPT

## 2024-06-13 SDOH — ECONOMIC STABILITY: FOOD INSECURITY: WITHIN THE PAST 12 MONTHS, YOU WORRIED THAT YOUR FOOD WOULD RUN OUT BEFORE YOU GOT MONEY TO BUY MORE.: NEVER TRUE

## 2024-06-13 SDOH — ECONOMIC STABILITY: HOUSING INSECURITY
IN THE LAST 12 MONTHS, WAS THERE A TIME WHEN YOU DID NOT HAVE A STEADY PLACE TO SLEEP OR SLEPT IN A SHELTER (INCLUDING NOW)?: NO

## 2024-06-13 SDOH — ECONOMIC STABILITY: INCOME INSECURITY: HOW HARD IS IT FOR YOU TO PAY FOR THE VERY BASICS LIKE FOOD, HOUSING, MEDICAL CARE, AND HEATING?: NOT HARD AT ALL

## 2024-06-13 SDOH — ECONOMIC STABILITY: FOOD INSECURITY: WITHIN THE PAST 12 MONTHS, THE FOOD YOU BOUGHT JUST DIDN'T LAST AND YOU DIDN'T HAVE MONEY TO GET MORE.: NEVER TRUE

## 2024-06-13 ASSESSMENT — PATIENT HEALTH QUESTIONNAIRE - PHQ9
SUM OF ALL RESPONSES TO PHQ9 QUESTIONS 1 & 2: 1
1. LITTLE INTEREST OR PLEASURE IN DOING THINGS: SEVERAL DAYS
3. TROUBLE FALLING OR STAYING ASLEEP: NOT AT ALL
8. MOVING OR SPEAKING SO SLOWLY THAT OTHER PEOPLE COULD HAVE NOTICED. OR THE OPPOSITE, BEING SO FIGETY OR RESTLESS THAT YOU HAVE BEEN MOVING AROUND A LOT MORE THAN USUAL: NOT AT ALL
SUM OF ALL RESPONSES TO PHQ QUESTIONS 1-9: 4
4. FEELING TIRED OR HAVING LITTLE ENERGY: SEVERAL DAYS
SUM OF ALL RESPONSES TO PHQ QUESTIONS 1-9: 4
7. TROUBLE CONCENTRATING ON THINGS, SUCH AS READING THE NEWSPAPER OR WATCHING TELEVISION: NOT AT ALL
10. IF YOU CHECKED OFF ANY PROBLEMS, HOW DIFFICULT HAVE THESE PROBLEMS MADE IT FOR YOU TO DO YOUR WORK, TAKE CARE OF THINGS AT HOME, OR GET ALONG WITH OTHER PEOPLE: NOT DIFFICULT AT ALL
9. THOUGHTS THAT YOU WOULD BE BETTER OFF DEAD, OR OF HURTING YOURSELF: NOT AT ALL
5. POOR APPETITE OR OVEREATING: NOT AT ALL
6. FEELING BAD ABOUT YOURSELF - OR THAT YOU ARE A FAILURE OR HAVE LET YOURSELF OR YOUR FAMILY DOWN: MORE THAN HALF THE DAYS
SUM OF ALL RESPONSES TO PHQ QUESTIONS 1-9: 4
SUM OF ALL RESPONSES TO PHQ QUESTIONS 1-9: 4

## 2024-06-13 ASSESSMENT — ENCOUNTER SYMPTOMS
COUGH: 0
NAUSEA: 0
SHORTNESS OF BREATH: 0
VOMITING: 0

## 2024-06-13 NOTE — PROGRESS NOTES
Well Adult Note  Name: Celina Guerra Today’s Date: 2024   MRN: [id number] Sex: Female   Age: 53 y.o. Ethnicity: Non- / Non    : 1970 Race: White (non-)      Celina Guerra is here for well adult exam.  History:  She is on summer break. Trying to keep busy. No plans to travel.  Walking every day.  Weight stable.   Planning to come back next year in the same role as the  .    Her older daughter, Brigitte is getting  in October. Younger daughter, Citlali is going to [employer] in the Fall to continue playing golf.    She is on 2.5mg amlodipine. Denies any dizziness, palpitations, SOB, leg swelling etc.  Having some transient symptoms of feeling flushed, increased HR, etc. Too afraid to check her BP because if it's high, will increase her anxiety.     +Anxiety. On fluoxetine 10mg daily.   Checks in with Dr. Gautam monthly.   Doing breathing exercises.     Mammogram: 23. Normal.  Never had a colorectal screening.    Review of Systems   Constitutional:  Positive for fatigue (transient). Negative for appetite change, chills, fever and unexpected weight change.   HENT: Negative.     Respiratory:  Negative for cough and shortness of breath.    Cardiovascular:  Negative for chest pain, palpitations and leg swelling.   Gastrointestinal:  Negative for nausea and vomiting.   Genitourinary:  Negative for frequency.   Musculoskeletal:  Negative for myalgias.   Skin: Negative.    Neurological:  Negative for dizziness, syncope, weakness and headaches.   Psychiatric/Behavioral:  Positive for dysphoric mood.        Allergies   Allergen Reactions    Erythromycin          Prior to Visit Medications    Medication Sig Taking? Authorizing Provider   FLUoxetine (PROZAC) 10 MG capsule Take 1 capsule by mouth daily Yes Turdy Moody, APRN - CNP   amLODIPine (NORVASC) 2.5 MG tablet Take 1 tablet by mouth daily Yes Trudy Moody, APRN - CNP   atorvastatin (LIPITOR) 10 MG

## 2024-06-13 NOTE — PATIENT INSTRUCTIONS
Advance Care Planning     Advance Care Planning opens a door to talk about and write down your wishes before a sudden accident or illness.  Make your goals, values, and preferences known.     This puts you in the ’s seat and helps others know what matters most to you so they won’t have to guess.      Where can you learn more?    Go to [email or web address]   to learn how to:    Name someone you trust to make healthcare decisions for you, only if you can’t. (Healthcare Power of )    Document your wishes for care if you were seriously ill and not expected to recover or are approaching end of life. (Advance Directive or Living Will)    The same page can be found using the QR code below.                Starting a Weight Loss Plan: Care Instructions  Overview     It can be a challenge to lose weight. But your doctor can help you make a weight-loss plan that meets your needs.  You don't have to make a lot of big changes at once. A better idea might be to focus on small changes and stick with them. When those changes become habit, you can add a few more changes.  Some people find it helpful to take an exercise or nutrition class. If you have questions, ask your doctor about seeing a registered dietitian or an exercise specialist. You might also think about joining a weight-loss support group.  If you're not ready to make changes right now, try to pick a date in the future. Then make an appointment with your doctor to talk about when and how you'll get started with a plan.  Follow-up care is a key part of your treatment and safety. Be sure to make and go to all appointments, and call your doctor if you are having problems. It's also a good idea to know your test results and keep a list of the medicines you take.  How can you care for yourself at home?  Set realistic goals. Many people expect to lose much more weight than is likely. A weight loss of 5% to 10% of your body

## 2024-06-14 DIAGNOSIS — D75.1 POLYCYTHEMIA: Primary | ICD-10-CM

## 2024-06-14 DIAGNOSIS — E78.2 MIXED HYPERLIPIDEMIA: Primary | ICD-10-CM

## 2024-06-14 LAB
25(OH)D3 SERPL-MCNC: 42.8 NG/ML
ALBUMIN SERPL-MCNC: 4.7 G/DL (ref 3.4–5)
ALBUMIN/GLOB SERPL: 1.6 {RATIO} (ref 1.1–2.2)
ALP SERPL-CCNC: 98 U/L (ref 40–129)
ALT SERPL-CCNC: 24 U/L (ref 10–40)
ANION GAP SERPL CALCULATED.3IONS-SCNC: 12 MMOL/L (ref 3–16)
AST SERPL-CCNC: 22 U/L (ref 15–37)
BASOPHILS # BLD: 0.2 K/UL (ref 0–0.2)
BASOPHILS NFR BLD: 2.2 %
BILIRUB SERPL-MCNC: 0.4 MG/DL (ref 0–1)
BUN SERPL-MCNC: 11 MG/DL (ref 7–20)
CALCIUM SERPL-MCNC: 9.5 MG/DL (ref 8.3–10.6)
CHLORIDE SERPL-SCNC: 103 MMOL/L (ref 99–110)
CHOLEST SERPL-MCNC: 244 MG/DL (ref 0–199)
CO2 SERPL-SCNC: 25 MMOL/L (ref 21–32)
CREAT SERPL-MCNC: 0.6 MG/DL (ref 0.6–1.1)
DEPRECATED RDW RBC AUTO: 14.1 % (ref 12.4–15.4)
EOSINOPHIL # BLD: 0.3 K/UL (ref 0–0.6)
EOSINOPHIL NFR BLD: 4.1 %
EST. AVERAGE GLUCOSE BLD GHB EST-MCNC: 119.8 MG/DL
FOLATE SERPL-MCNC: 17.38 NG/ML (ref 4.78–24.2)
FSH SERPL-ACNC: 33.3 MIU/ML
GFR SERPLBLD CREATININE-BSD FMLA CKD-EPI: >90 ML/MIN/{1.73_M2}
GLUCOSE SERPL-MCNC: 81 MG/DL (ref 70–99)
HBA1C MFR BLD: 5.8 %
HCT VFR BLD AUTO: 49.9 % (ref 36–48)
HDLC SERPL-MCNC: 66 MG/DL (ref 40–60)
HGB BLD-MCNC: 16.4 G/DL (ref 12–16)
LDLC SERPL CALC-MCNC: 132 MG/DL
LYMPHOCYTES # BLD: 2.7 K/UL (ref 1–5.1)
LYMPHOCYTES NFR BLD: 32.4 %
MAGNESIUM SERPL-MCNC: 2.2 MG/DL (ref 1.8–2.4)
MCH RBC QN AUTO: 28.5 PG (ref 26–34)
MCHC RBC AUTO-ENTMCNC: 32.9 G/DL (ref 31–36)
MCV RBC AUTO: 86.7 FL (ref 80–100)
MONOCYTES # BLD: 0.6 K/UL (ref 0–1.3)
MONOCYTES NFR BLD: 7.5 %
NEUTROPHILS # BLD: 4.4 K/UL (ref 1.7–7.7)
NEUTROPHILS NFR BLD: 53.8 %
PLATELET # BLD AUTO: 241 K/UL (ref 135–450)
PLATELET BLD QL SMEAR: ABNORMAL
PMV BLD AUTO: 10.7 FL (ref 5–10.5)
POTASSIUM SERPL-SCNC: 4.4 MMOL/L (ref 3.5–5.1)
PROT SERPL-MCNC: 7.6 G/DL (ref 6.4–8.2)
RBC # BLD AUTO: 5.76 M/UL (ref 4–5.2)
SLIDE REVIEW: ABNORMAL
SODIUM SERPL-SCNC: 140 MMOL/L (ref 136–145)
TRIGL SERPL-MCNC: 230 MG/DL (ref 0–150)
VIT B12 SERPL-MCNC: 711 PG/ML (ref 211–911)
VLDLC SERPL CALC-MCNC: 46 MG/DL
WBC # BLD AUTO: 8.2 K/UL (ref 4–11)

## 2024-06-14 RX ORDER — ATORVASTATIN CALCIUM 20 MG/1
20 TABLET, FILM COATED ORAL DAILY
Qty: 90 TABLET | Refills: 1 | Status: SHIPPED | OUTPATIENT
Start: 2024-06-14

## 2024-06-17 ENCOUNTER — NURSE ONLY (OUTPATIENT)
Dept: PRIMARY CARE CLINIC | Age: 54
End: 2024-06-17
Payer: COMMERCIAL

## 2024-06-17 DIAGNOSIS — D75.1 POLYCYTHEMIA: ICD-10-CM

## 2024-06-17 PROCEDURE — 36415 COLL VENOUS BLD VENIPUNCTURE: CPT

## 2024-06-17 NOTE — PROGRESS NOTES
Patient came into the office per physician's request for the following blood test(s): Erythropoietin, CBC.    Blood drawn in office by DL    # of tubes sent:1 SST, 1 LAV

## 2024-06-18 ENCOUNTER — COMMUNITY OUTREACH (OUTPATIENT)
Dept: PRIMARY CARE CLINIC | Age: 54
End: 2024-06-18

## 2024-06-18 LAB
BASOPHILS # BLD: 0.1 K/UL (ref 0–0.2)
BASOPHILS NFR BLD: 1.2 %
DEPRECATED RDW RBC AUTO: 14.2 % (ref 12.4–15.4)
EOSINOPHIL # BLD: 0.4 K/UL (ref 0–0.6)
EOSINOPHIL NFR BLD: 5.2 %
EPO SERPL-ACNC: 9 MU/ML (ref 4–27)
HCT VFR BLD AUTO: 46.3 % (ref 36–48)
HGB BLD-MCNC: 15.1 G/DL (ref 12–16)
LYMPHOCYTES # BLD: 2.3 K/UL (ref 1–5.1)
LYMPHOCYTES NFR BLD: 30.5 %
MCH RBC QN AUTO: 28.4 PG (ref 26–34)
MCHC RBC AUTO-ENTMCNC: 32.6 G/DL (ref 31–36)
MCV RBC AUTO: 87.1 FL (ref 80–100)
MONOCYTES # BLD: 0.6 K/UL (ref 0–1.3)
MONOCYTES NFR BLD: 7.3 %
NEUTROPHILS # BLD: 4.3 K/UL (ref 1.7–7.7)
NEUTROPHILS NFR BLD: 55.8 %
PLATELET # BLD AUTO: 261 K/UL (ref 135–450)
PMV BLD AUTO: 10.4 FL (ref 5–10.5)
RBC # BLD AUTO: 5.32 M/UL (ref 4–5.2)
WBC # BLD AUTO: 7.7 K/UL (ref 4–11)

## 2024-06-26 ENCOUNTER — TELEMEDICINE (OUTPATIENT)
Dept: PSYCHOLOGY | Age: 54
End: 2024-06-26
Payer: COMMERCIAL

## 2024-06-26 DIAGNOSIS — F41.1 GENERALIZED ANXIETY DISORDER: Primary | ICD-10-CM

## 2024-06-26 PROCEDURE — 90832 PSYTX W PT 30 MINUTES: CPT | Performed by: PSYCHOLOGIST

## 2024-07-17 ENCOUNTER — PATIENT MESSAGE (OUTPATIENT)
Dept: PRIMARY CARE CLINIC | Age: 54
End: 2024-07-17

## 2024-07-17 DIAGNOSIS — B00.9 HERPES SIMPLEX VIRUS (HSV) INFECTION: Primary | ICD-10-CM

## 2024-07-18 RX ORDER — VALACYCLOVIR HYDROCHLORIDE 500 MG/1
500 TABLET, FILM COATED ORAL 2 TIMES DAILY
Qty: 10 TABLET | Refills: 0 | Status: SHIPPED | OUTPATIENT
Start: 2024-07-18 | End: 2024-07-23

## 2024-07-18 NOTE — TELEPHONE ENCOUNTER
From: Celina Guerra  To: Trudy Moody  Sent: 7/17/2024 9:08 PM EDT  Subject: Are you able to call in Rx for me?    I am in need of the Valcycolvir , and the last bottle I have is from 2021, with expiration date in 2022. I have previously gotten from [facility], but do not have a [employer] account with them, so not able to contact them this evening and [place] most of the day Thursday. If you are able to do this, I greatly appreciate it. If not, I'll try to reach them by phone Friday.    Previous bottle says 500 mg twice a day for 5 days.    Thanks so much!

## 2024-07-18 NOTE — TELEPHONE ENCOUNTER
Would you mind asking her how she has been taking it ? I am assuming it's not daily use, but just for periodic flares.  Thanks!

## 2024-07-24 ENCOUNTER — TELEMEDICINE (OUTPATIENT)
Dept: PSYCHOLOGY | Age: 54
End: 2024-07-24
Payer: COMMERCIAL

## 2024-07-24 DIAGNOSIS — F41.1 GENERALIZED ANXIETY DISORDER: Primary | ICD-10-CM

## 2024-07-24 PROCEDURE — 90832 PSYTX W PT 30 MINUTES: CPT | Performed by: PSYCHOLOGIST

## 2024-07-24 NOTE — PROGRESS NOTES
Behavioral Health Consultation  Nettie Gautam Psy.D.  Psychologist  7/24/2024      Time spent with Patient: 25 minutes  This is patient's  29th  [facility] appointment.    Reason for Consult:    Chief Complaint   Patient presents with    Anxiety     Referring Provider: Trudy Moody, THAO - CNP  [address] [place] [address]    Feedback given to referring provider.    TELEHEALTH VISIT -- Audio/Visual  Celina Guerra, was evaluated through a synchronous (real-time) audio-video encounter. The patient (or guardian if applicable) is aware that this is a billable service, which includes applicable co-pays. This Virtual Visit was conducted with patient's (and/or legal guardian's) consent.  Patient identification was verified, and a caregiver was present when appropriate. The patient was located in a state where the provider was licensed to provide care.     Conducted a risk-benefit analysis and determined that the patient's presenting problems are consistent with the use of telepsychology. Determined that the patient and/or guardian has sufficient knowledge and skills in the use of technology enabling them to adequately benefit from telepsychology. It was determined that this patient was able to be properly treated without an in-person session. Patient or guardian verified that they were currently located at the [place] address that was provided during registration. Discussed consent form for telehealth and patient or guardian provided verbal consent.    Verified the following information:  Patient's identification: Yes  Patient location: [facility]  Patient's call back number: [phone]   Patient's emergency contact's name and number, as well as permission to contact them if needed: Extended Emergency Contact Information  Primary Emergency Contact: Naeem Guerra  Address: [address]  Home Phone: [phone]  Relation: Spouse

## 2024-08-30 ENCOUNTER — TELEMEDICINE (OUTPATIENT)
Dept: PSYCHOLOGY | Age: 54
End: 2024-08-30

## 2024-08-30 DIAGNOSIS — F41.1 GENERALIZED ANXIETY DISORDER: Primary | ICD-10-CM

## 2024-08-30 NOTE — PROGRESS NOTES
Opened in error. Patient was in car with , driving for a road trip. Explained that this [facility] cannot see her in moving car/with other people. Rescheduled.

## 2024-09-11 ENCOUNTER — TELEMEDICINE (OUTPATIENT)
Dept: PSYCHOLOGY | Age: 54
End: 2024-09-11
Payer: COMMERCIAL

## 2024-09-11 DIAGNOSIS — F41.1 GENERALIZED ANXIETY DISORDER: Primary | ICD-10-CM

## 2024-09-11 PROCEDURE — 90832 PSYTX W PT 30 MINUTES: CPT | Performed by: PSYCHOLOGIST

## 2024-10-07 ENCOUNTER — OFFICE VISIT (OUTPATIENT)
Dept: PRIMARY CARE CLINIC | Age: 54
End: 2024-10-07
Payer: COMMERCIAL

## 2024-10-07 VITALS
OXYGEN SATURATION: 97 % | DIASTOLIC BLOOD PRESSURE: 70 MMHG | WEIGHT: 179 LBS | HEART RATE: 87 BPM | BODY MASS INDEX: 33.82 KG/M2 | TEMPERATURE: 97.5 F | SYSTOLIC BLOOD PRESSURE: 108 MMHG

## 2024-10-07 DIAGNOSIS — R42 DIZZINESS: Primary | ICD-10-CM

## 2024-10-07 PROCEDURE — 3074F SYST BP LT 130 MM HG: CPT

## 2024-10-07 PROCEDURE — 3078F DIAST BP <80 MM HG: CPT

## 2024-10-07 PROCEDURE — 99213 OFFICE O/P EST LOW 20 MIN: CPT

## 2024-10-07 RX ORDER — FLUTICASONE PROPIONATE 50 MCG
1 SPRAY, SUSPENSION (ML) NASAL DAILY
Qty: 32 G | Refills: 1 | Status: SHIPPED | OUTPATIENT
Start: 2024-10-07

## 2024-10-07 RX ORDER — MECLIZINE HCL 12.5 MG 12.5 MG/1
12.5 TABLET ORAL 3 TIMES DAILY PRN
Qty: 15 TABLET | Refills: 0 | Status: SHIPPED | OUTPATIENT
Start: 2024-10-07 | End: 2024-10-17

## 2024-10-07 ASSESSMENT — ENCOUNTER SYMPTOMS
NAUSEA: 0
RHINORRHEA: 1
DIARRHEA: 0
SHORTNESS OF BREATH: 0
PHOTOPHOBIA: 1
SORE THROAT: 0
COUGH: 0
VOMITING: 0
SINUS PRESSURE: 0
SINUS PAIN: 0

## 2024-10-07 NOTE — PROGRESS NOTES
Pre-diabetes    Vitamin D deficiency       PE  Vitals:    10/07/24 1535   BP: 108/70   Pulse: 87   Temp: 97.5 °F (36.4 °C)   TempSrc: Temporal   SpO2: 97%   Weight: 81.2 kg (179 lb)     Estimated body mass index is 33.82 kg/m² as calculated from the following:    Height as of 12/15/23: 1.549 m (5' 1\").    Weight as of this encounter: 81.2 kg (179 lb).    Physical Exam  Vitals reviewed.   Constitutional:       Appearance: Normal appearance. She is not ill-appearing.   HENT:      Head: Normocephalic.      Right Ear: Tympanic membrane, ear canal and external ear normal. Tympanic membrane is not perforated or erythematous.      Left Ear: Ear canal and external ear normal. A middle ear effusion (very small; clear) is present. Tympanic membrane is not perforated or erythematous.      Nose: Nose normal. No mucosal edema, congestion or rhinorrhea.      Mouth/Throat:      Lips: Pink.      Mouth: Mucous membranes are moist.      Pharynx: Oropharynx is clear. Uvula midline. No pharyngeal swelling, oropharyngeal exudate, posterior oropharyngeal erythema or uvula swelling.   Eyes:      Extraocular Movements: Extraocular movements intact.      Conjunctiva/sclera: Conjunctivae normal.      Pupils: Pupils are equal, round, and reactive to light.   Cardiovascular:      Rate and Rhythm: Normal rate and regular rhythm.      Pulses: Normal pulses.      Heart sounds: Normal heart sounds.   Pulmonary:      Effort: Pulmonary effort is normal.      Breath sounds: Normal breath sounds. No wheezing.   Abdominal:      General: Abdomen is flat. Bowel sounds are normal.      Palpations: Abdomen is soft.   Musculoskeletal:         General: Normal range of motion.      Cervical back: Normal range of motion and neck supple.   Lymphadenopathy:      Cervical: No cervical adenopathy.   Skin:     General: Skin is warm and dry.      Capillary Refill: Capillary refill takes less than 2 seconds.   Neurological:      General: No focal deficit present.

## 2024-10-11 ENCOUNTER — TELEMEDICINE (OUTPATIENT)
Dept: PSYCHOLOGY | Age: 54
End: 2024-10-11
Payer: COMMERCIAL

## 2024-10-11 DIAGNOSIS — F41.1 GENERALIZED ANXIETY DISORDER: Primary | ICD-10-CM

## 2024-10-11 PROCEDURE — 90832 PSYTX W PT 30 MINUTES: CPT | Performed by: PSYCHOLOGIST

## 2024-10-11 NOTE — PROGRESS NOTES
Behavioral Health Consultation  Nettie Gautam Psy.D.  Psychologist  10/11/2024      Time spent with Patient: 25 minutes  This is patient's  31st  [facility] appointment.    Reason for Consult:    Chief Complaint   Patient presents with    Anxiety     Referring Provider: Trudy Moody, THAO - CNP  [address] [place] [address]    Feedback given to referring provider.    TELEHEALTH VISIT -- Audio/Visual  Celina Guerra, was evaluated through a synchronous (real-time) audio-video encounter. The patient (or guardian if applicable) is aware that this is a billable service, which includes applicable co-pays. This Virtual Visit was conducted with patient's (and/or legal guardian's) consent.  Patient identification was verified, and a caregiver was present when appropriate. The patient was located in a state where the provider was licensed to provide care.     Conducted a risk-benefit analysis and determined that the patient's presenting problems are consistent with the use of telepsychology. Determined that the patient and/or guardian has sufficient knowledge and skills in the use of technology enabling them to adequately benefit from telepsychology. It was determined that this patient was able to be properly treated without an in-person session. Patient or guardian verified that they were currently located at the [place] address that was provided during registration. Discussed consent form for telehealth and patient or guardian provided verbal consent.    Verified the following information:  Patient's identification: Yes  Patient location: [facility] [place]  Patient's call back number: [phone]   Patient's emergency contact's name and number, as well as permission to contact them if needed: Extended Emergency Contact Information  Primary Emergency Contact: Naeem Guerra  Address: [address]  Home Phone: [phone]  Relation: Spouse

## 2024-10-30 ENCOUNTER — TELEMEDICINE (OUTPATIENT)
Dept: PSYCHOLOGY | Age: 54
End: 2024-10-30
Payer: COMMERCIAL

## 2024-10-30 DIAGNOSIS — F41.1 GENERALIZED ANXIETY DISORDER: Primary | ICD-10-CM

## 2024-10-30 PROCEDURE — 90832 PSYTX W PT 30 MINUTES: CPT | Performed by: PSYCHOLOGIST

## 2024-10-30 NOTE — PROGRESS NOTES
Behavioral Health Consultation  Nettie Gautam Psy.D.  Psychologist  10/30/2024      Time spent with Patient: 25 minutes  This is patient's  32nd  [facility] appointment.    Reason for Consult:    Chief Complaint   Patient presents with    Anxiety     Referring Provider: Trudy Moody, THAO - CNP  [address] [place] [address]    Feedback given to referring provider.    TELEHEALTH VISIT -- Audio/Visual  Celina Guerra, was evaluated through a synchronous (real-time) audio-video encounter. The patient (or guardian if applicable) is aware that this is a billable service, which includes applicable co-pays. This Virtual Visit was conducted with patient's (and/or legal guardian's) consent.  Patient identification was verified, and a caregiver was present when appropriate. The patient was located in a state where the provider was licensed to provide care.     Conducted a risk-benefit analysis and determined that the patient's presenting problems are consistent with the use of telepsychology. Determined that the patient and/or guardian has sufficient knowledge and skills in the use of technology enabling them to adequately benefit from telepsychology. It was determined that this patient was able to be properly treated without an in-person session. Patient or guardian verified that they were currently located at the [place] address that was provided during registration. Discussed consent form for telehealth and patient or guardian provided verbal consent.    Verified the following information:  Patient's identification: Yes  Patient location: [address] [place] [address]   Patient's call back number: [phone]   Patient's emergency contact's name and number, as well as permission to contact them if needed: Extended Emergency Contact Information  Primary Emergency Contact: Naeem Guerra  Address: [address] [place] [address] of [place]  Home Phone:

## 2024-11-12 ENCOUNTER — PATIENT MESSAGE (OUTPATIENT)
Dept: PRIMARY CARE CLINIC | Age: 54
End: 2024-11-12

## 2024-11-12 DIAGNOSIS — B00.9 HERPES SIMPLEX VIRUS (HSV) INFECTION: ICD-10-CM

## 2024-11-12 RX ORDER — VALACYCLOVIR HYDROCHLORIDE 500 MG/1
500 TABLET, FILM COATED ORAL 2 TIMES DAILY
Qty: 10 TABLET | Refills: 0 | Status: SHIPPED | OUTPATIENT
Start: 2024-11-12 | End: 2024-11-17

## 2024-12-09 RX ORDER — AMLODIPINE BESYLATE 2.5 MG/1
2.5 TABLET ORAL DAILY
Qty: 90 TABLET | Refills: 1 | Status: SHIPPED | OUTPATIENT
Start: 2024-12-09

## 2024-12-11 ENCOUNTER — TELEMEDICINE (OUTPATIENT)
Dept: PSYCHOLOGY | Age: 54
End: 2024-12-11
Payer: COMMERCIAL

## 2024-12-11 DIAGNOSIS — F41.1 GENERALIZED ANXIETY DISORDER: Primary | ICD-10-CM

## 2024-12-11 PROCEDURE — 90832 PSYTX W PT 30 MINUTES: CPT | Performed by: PSYCHOLOGIST

## 2024-12-11 NOTE — PROGRESS NOTES
Behavioral Health Consultation  Nettie Gautam Psy.D.  Psychologist  12/11/2024      Time spent with Patient: 25 minutes  This is patient's  33rd  [facility] appointment.    Reason for Consult:    Chief Complaint   Patient presents with    Anxiety     Referring Provider: Trudy Moody, THAO - CNP  [address] [place] [address]    Feedback given to referring provider.    TELEHEALTH VISIT -- Audio/Visual  Celina Guerra, was evaluated through a synchronous (real-time) audio-video encounter. The patient (or guardian if applicable) is aware that this is a billable service, which includes applicable co-pays. This Virtual Visit was conducted with patient's (and/or legal guardian's) consent.  Patient identification was verified, and a caregiver was present when appropriate. The patient was located in a state where the provider was licensed to provide care.     Conducted a risk-benefit analysis and determined that the patient's presenting problems are consistent with the use of telepsychology. Determined that the patient and/or guardian has sufficient knowledge and skills in the use of technology enabling them to adequately benefit from telepsychology. It was determined that this patient was able to be properly treated without an in-person session. Patient or guardian verified that they were currently located at the [place] address that was provided during registration. Discussed consent form for telehealth and patient or guardian provided verbal consent.    Verified the following information:  Patient's identification: Yes  Patient location: [address] [place] [address]   Patient's call back number: [phone]   Patient's emergency contact's name and number, as well as permission to contact them if needed: Extended Emergency Contact Information  Primary Emergency Contact: Naeem Guerra  Address: [facility] [address]  Home Phone: [phone]  Relation:

## 2024-12-21 LAB — NONINV COLON CA DNA+OCC BLD SCRN STL QL: NEGATIVE

## 2024-12-30 DIAGNOSIS — E78.2 MIXED HYPERLIPIDEMIA: ICD-10-CM

## 2025-01-02 RX ORDER — ATORVASTATIN CALCIUM 20 MG/1
20 TABLET, FILM COATED ORAL DAILY
Qty: 90 TABLET | Refills: 1 | Status: SHIPPED | OUTPATIENT
Start: 2025-01-02

## 2025-02-10 ENCOUNTER — OFFICE VISIT (OUTPATIENT)
Dept: PRIMARY CARE CLINIC | Age: 55
End: 2025-02-10

## 2025-02-10 VITALS
BODY MASS INDEX: 34.39 KG/M2 | HEART RATE: 80 BPM | TEMPERATURE: 98.1 F | DIASTOLIC BLOOD PRESSURE: 70 MMHG | WEIGHT: 182 LBS | OXYGEN SATURATION: 98 % | SYSTOLIC BLOOD PRESSURE: 116 MMHG

## 2025-02-10 DIAGNOSIS — K21.9 GASTROESOPHAGEAL REFLUX DISEASE WITHOUT ESOPHAGITIS: ICD-10-CM

## 2025-02-10 DIAGNOSIS — R22.41 NODULE OF SKIN OF RIGHT LOWER EXTREMITY: Primary | ICD-10-CM

## 2025-02-10 DIAGNOSIS — I10 BENIGN ESSENTIAL HTN: ICD-10-CM

## 2025-02-10 DIAGNOSIS — R73.03 PRE-DIABETES: ICD-10-CM

## 2025-02-10 DIAGNOSIS — F41.9 ANXIETY: ICD-10-CM

## 2025-02-10 DIAGNOSIS — R53.83 FATIGUE, UNSPECIFIED TYPE: ICD-10-CM

## 2025-02-10 DIAGNOSIS — E55.9 VITAMIN D DEFICIENCY: ICD-10-CM

## 2025-02-10 DIAGNOSIS — E78.2 MIXED HYPERLIPIDEMIA: ICD-10-CM

## 2025-02-10 RX ORDER — ESTRADIOL 0.05 MG/D
1 PATCH TRANSDERMAL WEEKLY
COMMUNITY
Start: 2025-01-28

## 2025-02-10 SDOH — ECONOMIC STABILITY: FOOD INSECURITY: WITHIN THE PAST 12 MONTHS, YOU WORRIED THAT YOUR FOOD WOULD RUN OUT BEFORE YOU GOT MONEY TO BUY MORE.: NEVER TRUE

## 2025-02-10 SDOH — ECONOMIC STABILITY: FOOD INSECURITY: WITHIN THE PAST 12 MONTHS, THE FOOD YOU BOUGHT JUST DIDN'T LAST AND YOU DIDN'T HAVE MONEY TO GET MORE.: NEVER TRUE

## 2025-02-10 ASSESSMENT — PATIENT HEALTH QUESTIONNAIRE - PHQ9
6. FEELING BAD ABOUT YOURSELF - OR THAT YOU ARE A FAILURE OR HAVE LET YOURSELF OR YOUR FAMILY DOWN: SEVERAL DAYS
SUM OF ALL RESPONSES TO PHQ9 QUESTIONS 1 & 2: 1
5. POOR APPETITE OR OVEREATING: NOT AT ALL
SUM OF ALL RESPONSES TO PHQ QUESTIONS 1-9: 3
3. TROUBLE FALLING OR STAYING ASLEEP: NOT AT ALL
SUM OF ALL RESPONSES TO PHQ QUESTIONS 1-9: 3
1. LITTLE INTEREST OR PLEASURE IN DOING THINGS: SEVERAL DAYS
8. MOVING OR SPEAKING SO SLOWLY THAT OTHER PEOPLE COULD HAVE NOTICED. OR THE OPPOSITE, BEING SO FIGETY OR RESTLESS THAT YOU HAVE BEEN MOVING AROUND A LOT MORE THAN USUAL: NOT AT ALL
SUM OF ALL RESPONSES TO PHQ QUESTIONS 1-9: 3
9. THOUGHTS THAT YOU WOULD BE BETTER OFF DEAD, OR OF HURTING YOURSELF: NOT AT ALL
7. TROUBLE CONCENTRATING ON THINGS, SUCH AS READING THE NEWSPAPER OR WATCHING TELEVISION: NOT AT ALL
4. FEELING TIRED OR HAVING LITTLE ENERGY: SEVERAL DAYS
SUM OF ALL RESPONSES TO PHQ QUESTIONS 1-9: 3
2. FEELING DOWN, DEPRESSED OR HOPELESS: NOT AT ALL
10. IF YOU CHECKED OFF ANY PROBLEMS, HOW DIFFICULT HAVE THESE PROBLEMS MADE IT FOR YOU TO DO YOUR WORK, TAKE CARE OF THINGS AT HOME, OR GET ALONG WITH OTHER PEOPLE: SOMEWHAT DIFFICULT

## 2025-02-10 ASSESSMENT — ENCOUNTER SYMPTOMS
STRIDOR: 0
EYES NEGATIVE: 1
ALLERGIC/IMMUNOLOGIC NEGATIVE: 1
CHEST TIGHTNESS: 0
SINUS PRESSURE: 0
ABDOMINAL DISTENTION: 0
COUGH: 0
WHEEZING: 0
ABDOMINAL PAIN: 0
NAUSEA: 0
DIARRHEA: 0
TROUBLE SWALLOWING: 0
VOMITING: 0
RHINORRHEA: 0
CONSTIPATION: 0
SHORTNESS OF BREATH: 0
SORE THROAT: 0

## 2025-02-10 NOTE — ASSESSMENT & PLAN NOTE
Chronic, at goal (stable), Labs ordered for June and lifestyle modifications recommended    Orders:    Hemoglobin A1C; Future

## 2025-02-10 NOTE — ASSESSMENT & PLAN NOTE
Chronic, at goal (stable), labs ordered for june, medication adherence emphasized, and lifestyle modifications recommended    Orders:    Lipid Panel; Future

## 2025-02-10 NOTE — ASSESSMENT & PLAN NOTE
Chronic, at goal (stable), labs ordered for June, medication adherence emphasized, and lifestyle modifications recommended    Orders:    CBC with Auto Differential; Future

## 2025-02-10 NOTE — ASSESSMENT & PLAN NOTE
Chronic, at goal (stable), labs ordered for Giselle and lifestyle modifications recommended    Orders:    Comprehensive Metabolic Panel; Future

## 2025-02-10 NOTE — ASSESSMENT & PLAN NOTE
Chronic, at goal (stable), Labs ordered for June. Stopped taking her meds.     Orders:    Vitamin D 25 Hydroxy; Future

## 2025-02-10 NOTE — ASSESSMENT & PLAN NOTE
Chronic, at goal (stable), see Dr Gautam, medication adherence emphasized, and lifestyle modifications recommended

## 2025-02-14 ENCOUNTER — TELEMEDICINE (OUTPATIENT)
Dept: PSYCHOLOGY | Age: 55
End: 2025-02-14
Payer: COMMERCIAL

## 2025-02-14 DIAGNOSIS — F41.1 GENERALIZED ANXIETY DISORDER: Primary | ICD-10-CM

## 2025-02-14 PROCEDURE — 90832 PSYTX W PT 30 MINUTES: CPT | Performed by: PSYCHOLOGIST

## 2025-02-14 NOTE — PROGRESS NOTES
Behavioral Health Consultation  Nettie Gautam Psy.D.  Psychologist  2/14/2025      Time spent with Patient: 25 minutes  This is patient's  34th  [facility] appointment.    Reason for Consult:    Chief Complaint   Patient presents with    Anxiety     Referring Provider: Matilde Thomas, APRN - CNP  [address] [id number]    Feedback given to referring provider.    TELEHEALTH VISIT -- Audio/Visual  Celina Guerra, was evaluated through a synchronous (real-time) audio-video encounter. The patient (or guardian if applicable) is aware that this is a billable service, which includes applicable co-pays. This Virtual Visit was conducted with patient's (and/or legal guardian's) consent.  Patient identification was verified, and a caregiver was present when appropriate. The patient was located in a state where the provider was licensed to provide care.     Conducted a risk-benefit analysis and determined that the patient's presenting problems are consistent with the use of telepsychology. Determined that the patient and/or guardian has sufficient knowledge and skills in the use of technology enabling them to adequately benefit from telepsychology. It was determined that this patient was able to be properly treated without an in-person session. Patient or guardian verified that they were currently located at the [place] address that was provided during registration. Discussed consent form for telehealth and patient or guardian provided verbal consent.    Verified the following information:  Patient's identification: Yes  Patient location: [address] [address]   Patient's call back number: [phone]   Patient's emergency contact's name and number, as well as permission to contact them if needed: Extended Emergency Contact Information  Primary Emergency Contact: Naeem Guerra  Address: [address] [id number] [facility]  Home Phone:

## 2025-03-24 DIAGNOSIS — F41.9 ANXIETY: ICD-10-CM

## 2025-03-24 DIAGNOSIS — F32.A DEPRESSION, UNSPECIFIED DEPRESSION TYPE: ICD-10-CM

## 2025-03-25 ENCOUNTER — PATIENT MESSAGE (OUTPATIENT)
Dept: PRIMARY CARE CLINIC | Age: 55
End: 2025-03-25

## 2025-03-25 DIAGNOSIS — B00.9 HERPES SIMPLEX VIRUS (HSV) INFECTION: ICD-10-CM

## 2025-03-25 RX ORDER — FLUOXETINE 10 MG/1
10 CAPSULE ORAL DAILY
Qty: 90 CAPSULE | Refills: 1 | Status: SHIPPED | OUTPATIENT
Start: 2025-03-25

## 2025-03-26 RX ORDER — VALACYCLOVIR HYDROCHLORIDE 500 MG/1
500 TABLET, FILM COATED ORAL 2 TIMES DAILY
Qty: 10 TABLET | Refills: 0 | Status: SHIPPED | OUTPATIENT
Start: 2025-03-26 | End: 2025-03-31

## 2025-03-26 NOTE — TELEPHONE ENCOUNTER
Last seen on 2/10/25  Med last filled by Trudy on 11/12/24    [facility] pharmacy on [facility].

## 2025-03-28 ENCOUNTER — TELEMEDICINE (OUTPATIENT)
Dept: PSYCHOLOGY | Age: 55
End: 2025-03-28
Payer: COMMERCIAL

## 2025-03-28 DIAGNOSIS — F41.1 GENERALIZED ANXIETY DISORDER: Primary | ICD-10-CM

## 2025-03-28 PROCEDURE — 90832 PSYTX W PT 30 MINUTES: CPT | Performed by: PSYCHOLOGIST

## 2025-03-28 NOTE — PROGRESS NOTES
Behavioral Health Consultation  Nettie Gautam Psy.D.  Psychologist  3/28/2025      Time spent with Patient: 25 minutes  This is patient's  35th  [facility] appointment.    Reason for Consult:    Chief Complaint   Patient presents with    Anxiety     Referring Provider: Matilde Thomas, APRN - CNP  [address] [place] [address]    Feedback given to referring provider.    TELEHEALTH VISIT -- Audio/Visual  Celina Guerra, was evaluated through a synchronous (real-time) audio-video encounter. The patient (or guardian if applicable) is aware that this is a billable service, which includes applicable co-pays. This Virtual Visit was conducted with patient's (and/or legal guardian's) consent.  Patient identification was verified, and a caregiver was present when appropriate. The patient was located in a state where the provider was licensed to provide care.     Conducted a risk-benefit analysis and determined that the patient's presenting problems are consistent with the use of telepsychology. Determined that the patient and/or guardian has sufficient knowledge and skills in the use of technology enabling them to adequately benefit from telepsychology. It was determined that this patient was able to be properly treated without an in-person session. Patient or guardian verified that they were currently located at the [place] address that was provided during registration. Discussed consent form for telehealth and patient or guardian provided verbal consent.    Verified the following information:  Patient's identification: Yes  Patient location: [facility]  Patient's call back number: [phone]   Patient's emergency contact's name and number, as well as permission to contact them if needed: Extended Emergency Contact Information  Primary Emergency Contact: Naeem Guerra  Address: [address] [place] [address] [place]  Home Phone:

## 2025-06-06 ASSESSMENT — PATIENT HEALTH QUESTIONNAIRE - PHQ9
5. POOR APPETITE OR OVEREATING: NOT AT ALL
2. FEELING DOWN, DEPRESSED OR HOPELESS: SEVERAL DAYS
7. TROUBLE CONCENTRATING ON THINGS, SUCH AS READING THE NEWSPAPER OR WATCHING TELEVISION: NOT AT ALL
SUM OF ALL RESPONSES TO PHQ QUESTIONS 1-9: 4
8. MOVING OR SPEAKING SO SLOWLY THAT OTHER PEOPLE COULD HAVE NOTICED. OR THE OPPOSITE, BEING SO FIGETY OR RESTLESS THAT YOU HAVE BEEN MOVING AROUND A LOT MORE THAN USUAL: NOT AT ALL
9. THOUGHTS THAT YOU WOULD BE BETTER OFF DEAD, OR OF HURTING YOURSELF: NOT AT ALL
8. MOVING OR SPEAKING SO SLOWLY THAT OTHER PEOPLE COULD HAVE NOTICED. OR THE OPPOSITE - BEING SO FIDGETY OR RESTLESS THAT YOU HAVE BEEN MOVING AROUND A LOT MORE THAN USUAL: NOT AT ALL
7. TROUBLE CONCENTRATING ON THINGS, SUCH AS READING THE NEWSPAPER OR WATCHING TELEVISION: NOT AT ALL
4. FEELING TIRED OR HAVING LITTLE ENERGY: SEVERAL DAYS
9. THOUGHTS THAT YOU WOULD BE BETTER OFF DEAD, OR OF HURTING YOURSELF: NOT AT ALL
3. TROUBLE FALLING OR STAYING ASLEEP: NOT AT ALL
1. LITTLE INTEREST OR PLEASURE IN DOING THINGS: SEVERAL DAYS
2. FEELING DOWN, DEPRESSED OR HOPELESS: SEVERAL DAYS
5. POOR APPETITE OR OVEREATING: NOT AT ALL
6. FEELING BAD ABOUT YOURSELF - OR THAT YOU ARE A FAILURE OR HAVE LET YOURSELF OR YOUR FAMILY DOWN: SEVERAL DAYS
SUM OF ALL RESPONSES TO PHQ QUESTIONS 1-9: 4
3. TROUBLE FALLING OR STAYING ASLEEP: NOT AT ALL
10. IF YOU CHECKED OFF ANY PROBLEMS, HOW DIFFICULT HAVE THESE PROBLEMS MADE IT FOR YOU TO DO YOUR WORK, TAKE CARE OF THINGS AT HOME, OR GET ALONG WITH OTHER PEOPLE: SOMEWHAT DIFFICULT
1. LITTLE INTEREST OR PLEASURE IN DOING THINGS: SEVERAL DAYS
6. FEELING BAD ABOUT YOURSELF - OR THAT YOU ARE A FAILURE OR HAVE LET YOURSELF OR YOUR FAMILY DOWN: SEVERAL DAYS
SUM OF ALL RESPONSES TO PHQ QUESTIONS 1-9: 4
SUM OF ALL RESPONSES TO PHQ QUESTIONS 1-9: 4
10. IF YOU CHECKED OFF ANY PROBLEMS, HOW DIFFICULT HAVE THESE PROBLEMS MADE IT FOR YOU TO DO YOUR WORK, TAKE CARE OF THINGS AT HOME, OR GET ALONG WITH OTHER PEOPLE: SOMEWHAT DIFFICULT
SUM OF ALL RESPONSES TO PHQ QUESTIONS 1-9: 4
4. FEELING TIRED OR HAVING LITTLE ENERGY: SEVERAL DAYS

## 2025-06-06 ASSESSMENT — ANXIETY QUESTIONNAIRES
4. TROUBLE RELAXING: SEVERAL DAYS
1. FEELING NERVOUS, ANXIOUS, OR ON EDGE: SEVERAL DAYS
6. BECOMING EASILY ANNOYED OR IRRITABLE: MORE THAN HALF THE DAYS
5. BEING SO RESTLESS THAT IT IS HARD TO SIT STILL: NOT AT ALL
GAD7 TOTAL SCORE: 6
7. FEELING AFRAID AS IF SOMETHING AWFUL MIGHT HAPPEN: NOT AT ALL
5. BEING SO RESTLESS THAT IT IS HARD TO SIT STILL: NOT AT ALL
1. FEELING NERVOUS, ANXIOUS, OR ON EDGE: SEVERAL DAYS
3. WORRYING TOO MUCH ABOUT DIFFERENT THINGS: SEVERAL DAYS
2. NOT BEING ABLE TO STOP OR CONTROL WORRYING: SEVERAL DAYS
6. BECOMING EASILY ANNOYED OR IRRITABLE: MORE THAN HALF THE DAYS
IF YOU CHECKED OFF ANY PROBLEMS ON THIS QUESTIONNAIRE, HOW DIFFICULT HAVE THESE PROBLEMS MADE IT FOR YOU TO DO YOUR WORK, TAKE CARE OF THINGS AT HOME, OR GET ALONG WITH OTHER PEOPLE: SOMEWHAT DIFFICULT
7. FEELING AFRAID AS IF SOMETHING AWFUL MIGHT HAPPEN: NOT AT ALL
2. NOT BEING ABLE TO STOP OR CONTROL WORRYING: SEVERAL DAYS
4. TROUBLE RELAXING: SEVERAL DAYS
3. WORRYING TOO MUCH ABOUT DIFFERENT THINGS: SEVERAL DAYS
IF YOU CHECKED OFF ANY PROBLEMS ON THIS QUESTIONNAIRE, HOW DIFFICULT HAVE THESE PROBLEMS MADE IT FOR YOU TO DO YOUR WORK, TAKE CARE OF THINGS AT HOME, OR GET ALONG WITH OTHER PEOPLE: SOMEWHAT DIFFICULT

## 2025-06-11 ENCOUNTER — OFFICE VISIT (OUTPATIENT)
Dept: PRIMARY CARE CLINIC | Age: 55
End: 2025-06-11

## 2025-06-11 VITALS
TEMPERATURE: 97.8 F | HEART RATE: 58 BPM | SYSTOLIC BLOOD PRESSURE: 142 MMHG | DIASTOLIC BLOOD PRESSURE: 80 MMHG | BODY MASS INDEX: 34.96 KG/M2 | WEIGHT: 185 LBS | OXYGEN SATURATION: 98 %

## 2025-06-11 DIAGNOSIS — R53.83 FATIGUE, UNSPECIFIED TYPE: ICD-10-CM

## 2025-06-11 DIAGNOSIS — I10 BENIGN ESSENTIAL HTN: ICD-10-CM

## 2025-06-11 DIAGNOSIS — E55.9 VITAMIN D DEFICIENCY: ICD-10-CM

## 2025-06-11 DIAGNOSIS — K21.9 GASTROESOPHAGEAL REFLUX DISEASE WITHOUT ESOPHAGITIS: ICD-10-CM

## 2025-06-11 DIAGNOSIS — H91.93 BILATERAL HEARING LOSS, UNSPECIFIED HEARING LOSS TYPE: Primary | ICD-10-CM

## 2025-06-11 DIAGNOSIS — R41.840 ATTENTION AND CONCENTRATION DEFICIT: ICD-10-CM

## 2025-06-11 DIAGNOSIS — R73.03 PRE-DIABETES: ICD-10-CM

## 2025-06-11 DIAGNOSIS — E78.2 MIXED HYPERLIPIDEMIA: ICD-10-CM

## 2025-06-11 PROBLEM — Z12.31 ENCOUNTER FOR SCREENING MAMMOGRAM FOR MALIGNANT NEOPLASM OF BREAST: Status: RESOLVED | Noted: 2024-06-08 | Resolved: 2025-06-11

## 2025-06-11 PROBLEM — Z79.890 HORMONE REPLACEMENT THERAPY (HRT): Status: ACTIVE | Noted: 2025-01-29

## 2025-06-11 PROBLEM — M70.62 GREATER TROCHANTERIC BURSITIS OF LEFT HIP: Status: ACTIVE | Noted: 2025-03-07

## 2025-06-11 PROBLEM — Z01.419 ENCOUNTER FOR GYNECOLOGICAL EXAMINATION (GENERAL) (ROUTINE) WITHOUT ABNORMAL FINDINGS: Status: RESOLVED | Noted: 2024-04-12 | Resolved: 2025-06-11

## 2025-06-11 RX ORDER — AMLODIPINE BESYLATE 5 MG/1
5 TABLET ORAL DAILY
Qty: 90 TABLET | Refills: 1 | Status: SHIPPED | OUTPATIENT
Start: 2025-06-11

## 2025-06-11 RX ORDER — AMLODIPINE BESYLATE 2.5 MG/1
2.5 TABLET ORAL DAILY
Qty: 90 TABLET | Refills: 1 | Status: CANCELLED | OUTPATIENT
Start: 2025-06-11

## 2025-06-11 ASSESSMENT — ENCOUNTER SYMPTOMS
ABDOMINAL DISTENTION: 0
VOMITING: 0
ABDOMINAL PAIN: 0
STRIDOR: 0
CHEST TIGHTNESS: 0
DIARRHEA: 0
CONSTIPATION: 0
EYES NEGATIVE: 1
RHINORRHEA: 0
NAUSEA: 0
SHORTNESS OF BREATH: 0
TROUBLE SWALLOWING: 0
WHEEZING: 0
COUGH: 0
SORE THROAT: 0
SINUS PRESSURE: 0

## 2025-06-11 NOTE — PATIENT INSTRUCTIONS
Follow up as needed and as discussed in office.    any medications called in to pharmacy, if any concerns please call our office or send MyChart message so these can be resolved promptly.  If labs were obtained today-the usual turnaround time for results is 24-72 business hours. If you have not heard from office by then please call.   If referrals were sent, please call to schedule if you do not hear from specialist within 48 hours.      3-6 month follow up needed, depending on blood work results.

## 2025-06-11 NOTE — PROGRESS NOTES
[facility]  [facility]    Celina Guerra (:  1970) is a 54 y.o. female, here for evaluation of the following medical concerns:    Chief Complaint   Patient presents with    Follow-up        ASSESSMENT/ PLAN  Assessment & Plan  Bilateral hearing loss, unspecified hearing loss type   Chronic, not at goal (unstable), changes made today: refer audio    Orders:    Dorothy Chawla AU.D., Audiology, [facility] [[email or web address]    Benign essential HTN   Chronic, not at goal (unstable), changes made today: increase to 5mg.  Blood pressure today is 158/80, above goal. Patient is on low dose Norvasc (amlodipine) 2.5 mg daily. Recent lifestyle factors include increased sodium intake from frequent restaurant meals and lack of exercise. Prolonged systolic blood pressure >130 mmHg increases risk of cardiovascular and renal complications.  - Plan:    - Increase amlodipine from 2.5 mg to 5 mg PO daily    - Patient to monitor blood pressure at home once daily in the morning and record    - Follow up in 1 month to assess response to medication change and tolerability    - Educated on risks of prolonged hypertension and importance of lifestyle modifications    Orders:    amLODIPine (NORVASC) 5 MG tablet; Take 1 tablet by mouth daily    Albumin/Creatinine Ratio, Urine; Future    Attention and concentration deficit   Chronic, worsening (exacerbation), changes made today: discussed depression, anxiety, life stressors, and attention during visit. Gave Adult ADHD for for her to fill out and scan in for Dr Gautam to review  Patient reports ongoing anxiety and depressive symptoms, managed with Prozac 10 mg daily for approximately 3 years. Describes symptoms suggestive of adult ADHD including difficulty focusing, multiple unfinished projects, and impulsive behaviors. These symptoms are impacting daily functioning and contributing to feelings of frustration and low self-esteem. Patient has appointment scheduled

## 2025-06-11 NOTE — PROGRESS NOTES
Behavioral Health Consultation  Nettie Gautam Psy.D.  Psychologist  6/12/2025      Time spent with Patient: 25 minutes  This is patient's  36th  [facility] [place] appointment.    Reason for Consult:    Chief Complaint   Patient presents with    Anxiety     Referring Provider: Matilde Thomas, APRN - CNP  [address] [phone]    Feedback given to referring provider.    TELEHEALTH VISIT -- Audio/Visual  Celina Guerra, was evaluated through a synchronous (real-time) audio-video encounter. The patient (or guardian if applicable) is aware that this is a billable service, which includes applicable co-pays. This Virtual Visit was conducted with patient's (and/or legal guardian's) consent.  Patient identification was verified, and a caregiver was present when appropriate. The patient was located in a state where the provider was licensed to provide care.     Conducted a risk-benefit analysis and determined that the patient's presenting problems are consistent with the use of telepsychology. Determined that the patient and/or guardian has sufficient knowledge and skills in the use of technology enabling them to adequately benefit from telepsychology. It was determined that this patient was able to be properly treated without an in-person session. Patient or guardian verified that they were currently located at the [place] address that was provided during registration. Discussed consent form for telehealth and patient or guardian provided verbal consent.    Verified the following information:  Patient's identification: Yes  Patient location: [address] [address]   Patient's call back number: [phone]   Patient's emergency contact's name and number, as well as permission to contact them if needed: Extended Emergency Contact Information  Primary Emergency Contact: Naeem Guerra  Address: [address] [id number] [facility]  Home Phone:

## 2025-06-11 NOTE — ASSESSMENT & PLAN NOTE
Chronic, not at goal (unstable), changes made today: increase to 5mg.  Blood pressure today is 158/80, above goal. Patient is on low dose Norvasc (amlodipine) 2.5 mg daily. Recent lifestyle factors include increased sodium intake from frequent restaurant meals and lack of exercise. Prolonged systolic blood pressure >130 mmHg increases risk of cardiovascular and renal complications.  - Plan:    - Increase amlodipine from 2.5 mg to 5 mg PO daily    - Patient to monitor blood pressure at home once daily in the morning and record    - Follow up in 1 month to assess response to medication change and tolerability    - Educated on risks of prolonged hypertension and importance of lifestyle modifications    Orders:    amLODIPine (NORVASC) 5 MG tablet; Take 1 tablet by mouth daily    Albumin/Creatinine Ratio, Urine; Future

## 2025-06-12 ENCOUNTER — TELEMEDICINE (OUTPATIENT)
Dept: PSYCHOLOGY | Age: 55
End: 2025-06-12
Payer: COMMERCIAL

## 2025-06-12 ENCOUNTER — RESULTS FOLLOW-UP (OUTPATIENT)
Dept: PRIMARY CARE CLINIC | Age: 55
End: 2025-06-12

## 2025-06-12 DIAGNOSIS — F41.1 GENERALIZED ANXIETY DISORDER: Primary | ICD-10-CM

## 2025-06-12 LAB
25(OH)D3 SERPL-MCNC: 33.3 NG/ML
ALBUMIN SERPL-MCNC: 4.4 G/DL (ref 3.4–5)
ALBUMIN/GLOB SERPL: 1.6 {RATIO} (ref 1.1–2.2)
ALP SERPL-CCNC: 85 U/L (ref 40–129)
ALT SERPL-CCNC: 36 U/L (ref 10–40)
ANION GAP SERPL CALCULATED.3IONS-SCNC: 14 MMOL/L (ref 3–16)
AST SERPL-CCNC: 28 U/L (ref 15–37)
BASOPHILS # BLD: 0.1 K/UL (ref 0–0.2)
BASOPHILS NFR BLD: 1.5 %
BILIRUB SERPL-MCNC: 0.5 MG/DL (ref 0–1)
BUN SERPL-MCNC: 12 MG/DL (ref 7–20)
CALCIUM SERPL-MCNC: 9.4 MG/DL (ref 8.3–10.6)
CHLORIDE SERPL-SCNC: 102 MMOL/L (ref 99–110)
CHOLEST SERPL-MCNC: 202 MG/DL (ref 0–199)
CO2 SERPL-SCNC: 22 MMOL/L (ref 21–32)
CREAT SERPL-MCNC: 0.6 MG/DL (ref 0.6–1.1)
CREAT UR-MCNC: 113 MG/DL (ref 28–259)
DEPRECATED RDW RBC AUTO: 13.7 % (ref 12.4–15.4)
EOSINOPHIL # BLD: 0.3 K/UL (ref 0–0.6)
EOSINOPHIL NFR BLD: 3.2 %
EST. AVERAGE GLUCOSE BLD GHB EST-MCNC: 128.4 MG/DL
FOLATE SERPL-MCNC: 15.6 NG/ML (ref 4.78–24.2)
GFR SERPLBLD CREATININE-BSD FMLA CKD-EPI: >90 ML/MIN/{1.73_M2}
GLUCOSE SERPL-MCNC: 88 MG/DL (ref 70–99)
HBA1C MFR BLD: 6.1 %
HCT VFR BLD AUTO: 49.2 % (ref 36–48)
HDLC SERPL-MCNC: 66 MG/DL (ref 40–60)
HGB BLD-MCNC: 16.3 G/DL (ref 12–16)
LDLC SERPL CALC-MCNC: 100 MG/DL
LYMPHOCYTES # BLD: 2.3 K/UL (ref 1–5.1)
LYMPHOCYTES NFR BLD: 28.8 %
MCH RBC QN AUTO: 28.7 PG (ref 26–34)
MCHC RBC AUTO-ENTMCNC: 33.1 G/DL (ref 31–36)
MCV RBC AUTO: 86.6 FL (ref 80–100)
MICROALBUMIN UR DL<=1MG/L-MCNC: <1.2 MG/DL
MICROALBUMIN/CREAT UR: NORMAL MG/G (ref 0–30)
MONOCYTES # BLD: 0.6 K/UL (ref 0–1.3)
MONOCYTES NFR BLD: 8.1 %
NEUTROPHILS # BLD: 4.7 K/UL (ref 1.7–7.7)
NEUTROPHILS NFR BLD: 58.4 %
PLATELET # BLD AUTO: 288 K/UL (ref 135–450)
PMV BLD AUTO: 9.8 FL (ref 5–10.5)
POTASSIUM SERPL-SCNC: 4.3 MMOL/L (ref 3.5–5.1)
PROT SERPL-MCNC: 7.1 G/DL (ref 6.4–8.2)
RBC # BLD AUTO: 5.68 M/UL (ref 4–5.2)
SODIUM SERPL-SCNC: 138 MMOL/L (ref 136–145)
TRIGL SERPL-MCNC: 182 MG/DL (ref 0–150)
TSH SERPL DL<=0.005 MIU/L-ACNC: 1.46 UIU/ML (ref 0.27–4.2)
VIT B12 SERPL-MCNC: 470 PG/ML (ref 211–911)
VLDLC SERPL CALC-MCNC: 36 MG/DL
WBC # BLD AUTO: 8 K/UL (ref 4–11)

## 2025-06-12 PROCEDURE — 90832 PSYTX W PT 30 MINUTES: CPT | Performed by: PSYCHOLOGIST

## 2025-06-24 ENCOUNTER — RESULTS FOLLOW-UP (OUTPATIENT)
Dept: PRIMARY CARE CLINIC | Age: 55
End: 2025-06-24

## 2025-06-25 NOTE — PROGRESS NOTES
Behavioral Health Consultation  Nettie Gautam Psy.D.  Psychologist  6/26/2025      Time spent with Patient: 25 minutes  This is patient's 36th [facility] appointment.    Reason for Consult:    Chief Complaint   Patient presents with    Anxiety     Referring Provider: Matilde Thomas, APRN - CNP  [address] [id number]    Feedback given to referring provider.    TELEHEALTH VISIT -- Audio/Visual  Celina Guerra, was evaluated through a synchronous (real-time) audio-video encounter. The patient (or guardian if applicable) is aware that this is a billable service, which includes applicable co-pays. This Virtual Visit was conducted with patient's (and/or legal guardian's) consent.  Patient identification was verified, and a caregiver was present when appropriate. The patient was located in a state where the provider was licensed to provide care.     Conducted a risk-benefit analysis and determined that the patient's presenting problems are consistent with the use of telepsychology. Determined that the patient and/or guardian has sufficient knowledge and skills in the use of technology enabling them to adequately benefit from telepsychology. It was determined that this patient was able to be properly treated without an in-person session. Patient or guardian verified that they were currently located at the [place] address that was provided during registration. Discussed consent form for telehealth and patient or guardian provided verbal consent.    Verified the following information:  Patient's identification: Yes  Patient location: [address] [address]   Patient's call back number: [phone]   Patient's emergency contact's name and number, as well as permission to contact them if needed: Extended Emergency Contact Information  Primary Emergency Contact: Naeem Guerra  Address: [address] [address] [facility]  Home Phone:

## 2025-06-26 ENCOUNTER — TELEMEDICINE (OUTPATIENT)
Dept: PSYCHOLOGY | Age: 55
End: 2025-06-26
Payer: COMMERCIAL

## 2025-06-26 DIAGNOSIS — F41.1 GENERALIZED ANXIETY DISORDER: Primary | ICD-10-CM

## 2025-06-26 PROCEDURE — 90832 PSYTX W PT 30 MINUTES: CPT | Performed by: PSYCHOLOGIST

## 2025-06-29 ENCOUNTER — PATIENT MESSAGE (OUTPATIENT)
Dept: PRIMARY CARE CLINIC | Age: 55
End: 2025-06-29

## 2025-06-29 DIAGNOSIS — E78.2 MIXED HYPERLIPIDEMIA: ICD-10-CM

## 2025-06-30 RX ORDER — ATORVASTATIN CALCIUM 20 MG/1
20 TABLET, FILM COATED ORAL DAILY
Qty: 90 TABLET | Refills: 1 | Status: SHIPPED | OUTPATIENT
Start: 2025-06-30

## 2025-07-10 ENCOUNTER — PROCEDURE VISIT (OUTPATIENT)
Dept: AUDIOLOGY | Age: 55
End: 2025-07-10
Payer: COMMERCIAL

## 2025-07-10 DIAGNOSIS — H93.13 TINNITUS OF BOTH EARS: ICD-10-CM

## 2025-07-10 DIAGNOSIS — H90.3 SENSORINEURAL HEARING LOSS, BILATERAL: Primary | ICD-10-CM

## 2025-07-10 PROCEDURE — 92567 TYMPANOMETRY: CPT | Performed by: AUDIOLOGIST

## 2025-07-10 PROCEDURE — 92557 COMPREHENSIVE HEARING TEST: CPT | Performed by: AUDIOLOGIST

## 2025-07-10 NOTE — PROGRESS NOTES
Celina Guerra   1970, 54 y.o. female   [id number]       Referring Provider: Matilde Thomas APRN - CNP   Referral Type: In an order in Epic    Reason for Visit: Evaluation of the cause of disorders of hearing, tinnitus, or balance.    ADULT AUDIOLOGIC EVALUATION      Celina Guerra is a 54 y.o. female seen today, 7/10/2025 , for an initial audiologic evaluation.  Patient was alone.    AUDIOLOGIC AND OTHER PERTINENT MEDICAL HISTORY:      Celina Guerra noted tinnitus and family history of hearing loss.  Patient reports known hearing loss without the use of hearing aids for at least 20 years.  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  Patient noted having constant bilateral tinnitus.  She has a family history of hearing loss, father and brother, occurring in adulthood.    Celina Guerra denied otalgia, aural fullness, dizziness, history of occupational/recreational noise exposure, history of head trauma, and history of ear surgery.    Date: 7/10/2025     IMPRESSIONS:      Normal middle ear pressure and compliance, bilaterally.  Abnormal hearing sensitivity, bilaterally, which can affect every day listening needs.  Word understanding was excellent presented at elevated sensation levels, bilaterally.  Hearing aids are recommended at this time.  Follow medical recommendations of Matilde Thomas APRN - CNP .     ASSESSMENT AND FINDINGS:     Otoscopy revealed: Clear ear canals bilaterally    RIGHT EAR:  Hearing Sensitivity:Mild to a severe sensorineural hearing loss from 250-8000 Hz  Speech Recognition Threshold: 60 dB HL  Word Recognition:Excellent (92%), based on NU-6 25-word list at 90 dBHL with 60 dBHL of masking using recorded speech stimuli.    Tympanometry: Normal peak pressure and compliance, Type A tympanogram, consistent with normal middle ear function.      LEFT EAR:  Hearing Sensitivity:Mild to a moderately severe sensorineural

## 2025-07-17 ENCOUNTER — TELEMEDICINE (OUTPATIENT)
Dept: PSYCHOLOGY | Age: 55
End: 2025-07-17
Payer: COMMERCIAL

## 2025-07-17 DIAGNOSIS — F41.1 GENERALIZED ANXIETY DISORDER: Primary | ICD-10-CM

## 2025-07-17 PROCEDURE — 90832 PSYTX W PT 30 MINUTES: CPT | Performed by: PSYCHOLOGIST

## 2025-07-17 ASSESSMENT — ANXIETY QUESTIONNAIRES
5. BEING SO RESTLESS THAT IT IS HARD TO SIT STILL: NOT AT ALL
3. WORRYING TOO MUCH ABOUT DIFFERENT THINGS: SEVERAL DAYS
1. FEELING NERVOUS, ANXIOUS, OR ON EDGE: NOT AT ALL
4. TROUBLE RELAXING: NOT AT ALL
1. FEELING NERVOUS, ANXIOUS, OR ON EDGE: NOT AT ALL
5. BEING SO RESTLESS THAT IT IS HARD TO SIT STILL: NOT AT ALL
GAD7 TOTAL SCORE: 2
6. BECOMING EASILY ANNOYED OR IRRITABLE: SEVERAL DAYS
2. NOT BEING ABLE TO STOP OR CONTROL WORRYING: NOT AT ALL
7. FEELING AFRAID AS IF SOMETHING AWFUL MIGHT HAPPEN: NOT AT ALL
IF YOU CHECKED OFF ANY PROBLEMS ON THIS QUESTIONNAIRE, HOW DIFFICULT HAVE THESE PROBLEMS MADE IT FOR YOU TO DO YOUR WORK, TAKE CARE OF THINGS AT HOME, OR GET ALONG WITH OTHER PEOPLE: SOMEWHAT DIFFICULT
2. NOT BEING ABLE TO STOP OR CONTROL WORRYING: NOT AT ALL
IF YOU CHECKED OFF ANY PROBLEMS ON THIS QUESTIONNAIRE, HOW DIFFICULT HAVE THESE PROBLEMS MADE IT FOR YOU TO DO YOUR WORK, TAKE CARE OF THINGS AT HOME, OR GET ALONG WITH OTHER PEOPLE: SOMEWHAT DIFFICULT
4. TROUBLE RELAXING: NOT AT ALL
7. FEELING AFRAID AS IF SOMETHING AWFUL MIGHT HAPPEN: NOT AT ALL
6. BECOMING EASILY ANNOYED OR IRRITABLE: SEVERAL DAYS
3. WORRYING TOO MUCH ABOUT DIFFERENT THINGS: SEVERAL DAYS

## 2025-07-17 ASSESSMENT — PATIENT HEALTH QUESTIONNAIRE - PHQ9
4. FEELING TIRED OR HAVING LITTLE ENERGY: SEVERAL DAYS
6. FEELING BAD ABOUT YOURSELF - OR THAT YOU ARE A FAILURE OR HAVE LET YOURSELF OR YOUR FAMILY DOWN: SEVERAL DAYS
9. THOUGHTS THAT YOU WOULD BE BETTER OFF DEAD, OR OF HURTING YOURSELF: NOT AT ALL
4. FEELING TIRED OR HAVING LITTLE ENERGY: SEVERAL DAYS
7. TROUBLE CONCENTRATING ON THINGS, SUCH AS READING THE NEWSPAPER OR WATCHING TELEVISION: SEVERAL DAYS
6. FEELING BAD ABOUT YOURSELF - OR THAT YOU ARE A FAILURE OR HAVE LET YOURSELF OR YOUR FAMILY DOWN: SEVERAL DAYS
SUM OF ALL RESPONSES TO PHQ QUESTIONS 1-9: 6
7. TROUBLE CONCENTRATING ON THINGS, SUCH AS READING THE NEWSPAPER OR WATCHING TELEVISION: SEVERAL DAYS
1. LITTLE INTEREST OR PLEASURE IN DOING THINGS: SEVERAL DAYS
5. POOR APPETITE OR OVEREATING: SEVERAL DAYS
8. MOVING OR SPEAKING SO SLOWLY THAT OTHER PEOPLE COULD HAVE NOTICED. OR THE OPPOSITE, BEING SO FIGETY OR RESTLESS THAT YOU HAVE BEEN MOVING AROUND A LOT MORE THAN USUAL: NOT AT ALL
8. MOVING OR SPEAKING SO SLOWLY THAT OTHER PEOPLE COULD HAVE NOTICED. OR THE OPPOSITE - BEING SO FIDGETY OR RESTLESS THAT YOU HAVE BEEN MOVING AROUND A LOT MORE THAN USUAL: NOT AT ALL
1. LITTLE INTEREST OR PLEASURE IN DOING THINGS: SEVERAL DAYS
SUM OF ALL RESPONSES TO PHQ QUESTIONS 1-9: 6
2. FEELING DOWN, DEPRESSED OR HOPELESS: SEVERAL DAYS
SUM OF ALL RESPONSES TO PHQ QUESTIONS 1-9: 6
10. IF YOU CHECKED OFF ANY PROBLEMS, HOW DIFFICULT HAVE THESE PROBLEMS MADE IT FOR YOU TO DO YOUR WORK, TAKE CARE OF THINGS AT HOME, OR GET ALONG WITH OTHER PEOPLE: SOMEWHAT DIFFICULT
2. FEELING DOWN, DEPRESSED OR HOPELESS: SEVERAL DAYS
9. THOUGHTS THAT YOU WOULD BE BETTER OFF DEAD, OR OF HURTING YOURSELF: NOT AT ALL
SUM OF ALL RESPONSES TO PHQ QUESTIONS 1-9: 6
SUM OF ALL RESPONSES TO PHQ QUESTIONS 1-9: 6
3. TROUBLE FALLING OR STAYING ASLEEP: NOT AT ALL
10. IF YOU CHECKED OFF ANY PROBLEMS, HOW DIFFICULT HAVE THESE PROBLEMS MADE IT FOR YOU TO DO YOUR WORK, TAKE CARE OF THINGS AT HOME, OR GET ALONG WITH OTHER PEOPLE: SOMEWHAT DIFFICULT
3. TROUBLE FALLING OR STAYING ASLEEP: NOT AT ALL
5. POOR APPETITE OR OVEREATING: SEVERAL DAYS

## 2025-07-17 NOTE — PROGRESS NOTES
Behavioral Health Consultation  Nettie Gautam Psy.D.  Psychologist  7/17/2025      Time spent with Patient: 25 minutes  This is patient's 38th [facility] appointment.    Reason for Consult:    Chief Complaint   Patient presents with    Anxiety     Referring Provider: Matilde Thomas, APRN - CNP  [address] [phone]    Feedback given to referring provider.    TELEHEALTH VISIT -- Audio/Visual  Celina Guerra, was evaluated through a synchronous (real-time) audio-video encounter. The patient (or guardian if applicable) is aware that this is a billable service, which includes applicable co-pays. This Virtual Visit was conducted with patient's (and/or legal guardian's) consent.  Patient identification was verified, and a caregiver was present when appropriate. The patient was located in a state where the provider was licensed to provide care.     Conducted a risk-benefit analysis and determined that the patient's presenting problems are consistent with the use of telepsychology. Determined that the patient and/or guardian has sufficient knowledge and skills in the use of technology enabling them to adequately benefit from telepsychology. It was determined that this patient was able to be properly treated without an in-person session. Patient or guardian verified that they were currently located at the [place] address that was provided during registration. Discussed consent form for telehealth and patient or guardian provided verbal consent.    Verified the following information:  Patient's identification: Yes  Patient location: [address] [address]   Patient's call back number: [phone]   Patient's emergency contact's name and number, as well as permission to contact them if needed: Extended Emergency Contact Information  Primary Emergency Contact: Naeem Guerra  Address: [address] [id number] [facility]  Home Phone:

## 2025-07-18 ENCOUNTER — OFFICE VISIT (OUTPATIENT)
Dept: PRIMARY CARE CLINIC | Age: 55
End: 2025-07-18

## 2025-07-18 VITALS
OXYGEN SATURATION: 98 % | WEIGHT: 177 LBS | SYSTOLIC BLOOD PRESSURE: 124 MMHG | BODY MASS INDEX: 33.44 KG/M2 | DIASTOLIC BLOOD PRESSURE: 70 MMHG | HEART RATE: 85 BPM | TEMPERATURE: 97.8 F

## 2025-07-18 DIAGNOSIS — E66.3 OVERWEIGHT: ICD-10-CM

## 2025-07-18 DIAGNOSIS — F41.9 ANXIETY: ICD-10-CM

## 2025-07-18 DIAGNOSIS — I10 BENIGN ESSENTIAL HTN: Primary | ICD-10-CM

## 2025-07-18 PROBLEM — E53.8 COBALAMIN DEFICIENCY: Status: ACTIVE | Noted: 2017-01-03

## 2025-07-18 PROBLEM — Z01.419 ENCOUNTER FOR GYNECOLOGICAL EXAMINATION (GENERAL) (ROUTINE) WITHOUT ABNORMAL FINDINGS: Status: RESOLVED | Noted: 2025-06-10 | Resolved: 2025-07-18

## 2025-07-18 PROBLEM — Z79.899 HIGH RISK MEDICATION USE: Status: RESOLVED | Noted: 2025-01-29 | Resolved: 2025-07-18

## 2025-07-18 PROBLEM — R71.8 HIGH HEMATOCRIT: Status: ACTIVE | Noted: 2017-12-22

## 2025-07-18 PROBLEM — F41.1 GENERALIZED ANXIETY DISORDER: Status: ACTIVE | Noted: 2018-07-11

## 2025-07-18 PROBLEM — E66.811 CLASS 1 OBESITY: Status: ACTIVE | Noted: 2017-07-17

## 2025-07-18 NOTE — PROGRESS NOTES
[facility]  2025    Celina Guerra (:  1970) is a 54 y.o. female, here for evaluation of the following medical concerns:    Chief Complaint   Patient presents with    Hypertension        ASSESSMENT/ PLAN  Assessment & Plan  Benign essential HTN   Chronic, at goal (stable), continue current treatment plan  Patient reports variable home blood pressure readings, ranging from 109/85 to 143/89. Most days, readings are in the 110s. Recent increase in antihypertensive medication has been implemented. Blood pressure variability is likely influenced by anxiety and mental health factors. Patient denies symptoms such as blurry vision but reports frequent headaches, possibly related to teeth clenching. Recent lifestyle modifications include consistent exercise, dietary changes (eliminating soda and reducing eating out), and weight loss of approximately 8 pounds in the past month.  - Plan:    - Continue current antihypertensive regimen, including Norvasc 5 mg (dose to be confirmed)    - Encourage continuation of lifestyle modifications (exercise, dietary changes)    - Monitor home blood pressure readings    - Follow up in 2-3 weeks (before school starts) for blood pressure check and lifestyle modification review    - Consider more frequent check-ins as needed, including brief visits for weight checks and discussions       Anxiety   Chronic, at goal (stable), continue current treatment plan  Patient reports significant improvement in anxiety symptoms compared to initial presentation. Previously experienced physical symptoms such as red ears and heart pounding during office visits, which have now resolved. Current anxiety management includes Prozac (dose not specified) and lifestyle modifications such as regular exercise. Patient expresses concern about maintaining new habits when school resumes but demonstrates motivation and commitment to self-care.  - Plan:    - Continue current

## 2025-07-18 NOTE — ASSESSMENT & PLAN NOTE
Chronic, at goal (stable), continue current treatment plan  Patient reports significant improvement in anxiety symptoms compared to initial presentation. Previously experienced physical symptoms such as red ears and heart pounding during office visits, which have now resolved. Current anxiety management includes Prozac (dose not specified) and lifestyle modifications such as regular exercise. Patient expresses concern about maintaining new habits when school resumes but demonstrates motivation and commitment to self-care.  - Plan:    - Continue current Prozac regimen (dose to be confirmed)    - Maintain regular exercise routine, adapting schedule as needed when school resumes    - Follow up with Dr. Tillman (mental health provider) as scheduled    - Encourage ongoing self-prioritization and stress management techniques

## 2025-07-18 NOTE — ASSESSMENT & PLAN NOTE
Chronic, at goal (stable), continue current treatment plan  Patient reports variable home blood pressure readings, ranging from 109/85 to 143/89. Most days, readings are in the 110s. Recent increase in antihypertensive medication has been implemented. Blood pressure variability is likely influenced by anxiety and mental health factors. Patient denies symptoms such as blurry vision but reports frequent headaches, possibly related to teeth clenching. Recent lifestyle modifications include consistent exercise, dietary changes (eliminating soda and reducing eating out), and weight loss of approximately 8 pounds in the past month.  - Plan:    - Continue current antihypertensive regimen, including Norvasc 5 mg (dose to be confirmed)    - Encourage continuation of lifestyle modifications (exercise, dietary changes)    - Monitor home blood pressure readings    - Follow up in 2-3 weeks (before school starts) for blood pressure check and lifestyle modification review    - Consider more frequent check-ins as needed, including brief visits for weight checks and discussions

## 2025-08-07 ENCOUNTER — TELEMEDICINE (OUTPATIENT)
Dept: PSYCHOLOGY | Age: 55
End: 2025-08-07
Payer: COMMERCIAL

## 2025-08-07 DIAGNOSIS — F41.1 GENERALIZED ANXIETY DISORDER: Primary | ICD-10-CM

## 2025-08-07 PROCEDURE — 90832 PSYTX W PT 30 MINUTES: CPT | Performed by: PSYCHOLOGIST

## 2025-08-18 ENCOUNTER — CLINICAL SUPPORT (OUTPATIENT)
Dept: PRIMARY CARE CLINIC | Age: 55
End: 2025-08-18

## 2025-08-18 VITALS — SYSTOLIC BLOOD PRESSURE: 118 MMHG | DIASTOLIC BLOOD PRESSURE: 72 MMHG

## 2025-08-24 ENCOUNTER — PATIENT MESSAGE (OUTPATIENT)
Dept: PRIMARY CARE CLINIC | Age: 55
End: 2025-08-24

## 2025-08-24 DIAGNOSIS — Z78.0 POST-MENOPAUSE: ICD-10-CM

## 2025-08-24 DIAGNOSIS — Z82.49 FAMILY HISTORY OF HEART DISEASE: Primary | ICD-10-CM
